# Patient Record
Sex: MALE | Race: WHITE | NOT HISPANIC OR LATINO | Employment: FULL TIME | URBAN - METROPOLITAN AREA
[De-identification: names, ages, dates, MRNs, and addresses within clinical notes are randomized per-mention and may not be internally consistent; named-entity substitution may affect disease eponyms.]

---

## 2023-02-07 PROBLEM — I10 ESSENTIAL HYPERTENSION, BENIGN: Status: ACTIVE | Noted: 2023-02-07

## 2023-02-07 PROBLEM — H10.9 BACTERIAL CONJUNCTIVITIS OF BOTH EYES: Status: ACTIVE | Noted: 2023-02-07

## 2023-02-07 PROBLEM — R53.83 MALAISE AND FATIGUE: Status: ACTIVE | Noted: 2023-02-07

## 2023-02-07 PROBLEM — R53.81 MALAISE AND FATIGUE: Status: ACTIVE | Noted: 2023-02-07

## 2023-02-07 PROBLEM — J18.9 CAP (COMMUNITY ACQUIRED PNEUMONIA): Status: ACTIVE | Noted: 2023-02-07

## 2023-02-07 PROBLEM — R91.8 BILATERAL PULMONARY INFILTRATES ON CXR: Status: ACTIVE | Noted: 2023-02-07

## 2023-02-07 PROBLEM — B96.89 BACTERIAL CONJUNCTIVITIS OF BOTH EYES: Status: ACTIVE | Noted: 2023-02-07

## 2023-02-07 PROBLEM — R68.89 FLU-LIKE SYMPTOMS: Status: ACTIVE | Noted: 2023-02-07

## 2023-02-07 PROBLEM — N52.9 MALE ERECTILE DISORDER: Status: ACTIVE | Noted: 2023-02-07

## 2023-02-07 RX ORDER — HYDROCHLOROTHIAZIDE 25 MG/1
1 TABLET ORAL DAILY
COMMUNITY
Start: 2013-12-23 | End: 2023-03-21 | Stop reason: SDUPTHER

## 2023-03-21 ENCOUNTER — OFFICE VISIT (OUTPATIENT)
Dept: PRIMARY CARE | Facility: CLINIC | Age: 78
End: 2023-03-21
Payer: MEDICARE

## 2023-03-21 VITALS
WEIGHT: 176 LBS | BODY MASS INDEX: 26.07 KG/M2 | SYSTOLIC BLOOD PRESSURE: 106 MMHG | HEART RATE: 88 BPM | HEIGHT: 69 IN | OXYGEN SATURATION: 94 % | TEMPERATURE: 97.9 F | DIASTOLIC BLOOD PRESSURE: 70 MMHG

## 2023-03-21 DIAGNOSIS — I10 ESSENTIAL HYPERTENSION, BENIGN: ICD-10-CM

## 2023-03-21 DIAGNOSIS — E78.2 MIXED HYPERLIPIDEMIA: ICD-10-CM

## 2023-03-21 DIAGNOSIS — Z00.00 ROUTINE GENERAL MEDICAL EXAMINATION AT HEALTH CARE FACILITY: Primary | ICD-10-CM

## 2023-03-21 PROBLEM — R53.83 MALAISE AND FATIGUE: Status: RESOLVED | Noted: 2023-02-07 | Resolved: 2023-03-21

## 2023-03-21 PROBLEM — R91.8 BILATERAL PULMONARY INFILTRATES ON CXR: Status: RESOLVED | Noted: 2023-02-07 | Resolved: 2023-03-21

## 2023-03-21 PROBLEM — J18.9 CAP (COMMUNITY ACQUIRED PNEUMONIA): Status: RESOLVED | Noted: 2023-02-07 | Resolved: 2023-03-21

## 2023-03-21 PROBLEM — R53.81 MALAISE AND FATIGUE: Status: RESOLVED | Noted: 2023-02-07 | Resolved: 2023-03-21

## 2023-03-21 PROBLEM — H10.9 BACTERIAL CONJUNCTIVITIS OF BOTH EYES: Status: RESOLVED | Noted: 2023-02-07 | Resolved: 2023-03-21

## 2023-03-21 PROBLEM — B96.89 BACTERIAL CONJUNCTIVITIS OF BOTH EYES: Status: RESOLVED | Noted: 2023-02-07 | Resolved: 2023-03-21

## 2023-03-21 PROBLEM — R68.89 FLU-LIKE SYMPTOMS: Status: RESOLVED | Noted: 2023-02-07 | Resolved: 2023-03-21

## 2023-03-21 LAB
ALANINE AMINOTRANSFERASE (SGPT) (U/L) IN SER/PLAS: 20 U/L (ref 10–52)
ALBUMIN (G/DL) IN SER/PLAS: 4.1 G/DL (ref 3.4–5)
ALKALINE PHOSPHATASE (U/L) IN SER/PLAS: 47 U/L (ref 33–136)
ANION GAP IN SER/PLAS: 13 MMOL/L (ref 10–20)
ASPARTATE AMINOTRANSFERASE (SGOT) (U/L) IN SER/PLAS: 19 U/L (ref 9–39)
BASOPHILS (10*3/UL) IN BLOOD BY AUTOMATED COUNT: 0.04 X10E9/L (ref 0–0.1)
BASOPHILS/100 LEUKOCYTES IN BLOOD BY AUTOMATED COUNT: 0.5 % (ref 0–2)
BILIRUBIN TOTAL (MG/DL) IN SER/PLAS: 0.6 MG/DL (ref 0–1.2)
CALCIUM (MG/DL) IN SER/PLAS: 9.7 MG/DL (ref 8.6–10.6)
CARBON DIOXIDE, TOTAL (MMOL/L) IN SER/PLAS: 27 MMOL/L (ref 21–32)
CHLORIDE (MMOL/L) IN SER/PLAS: 103 MMOL/L (ref 98–107)
CHOLESTEROL (MG/DL) IN SER/PLAS: 249 MG/DL (ref 0–199)
CHOLESTEROL IN HDL (MG/DL) IN SER/PLAS: 72.3 MG/DL
CHOLESTEROL/HDL RATIO: 3.4
CREATININE (MG/DL) IN SER/PLAS: 1.12 MG/DL (ref 0.5–1.3)
EOSINOPHILS (10*3/UL) IN BLOOD BY AUTOMATED COUNT: 0.13 X10E9/L (ref 0–0.4)
EOSINOPHILS/100 LEUKOCYTES IN BLOOD BY AUTOMATED COUNT: 1.6 % (ref 0–6)
ERYTHROCYTE DISTRIBUTION WIDTH (RATIO) BY AUTOMATED COUNT: 12.6 % (ref 11.5–14.5)
ERYTHROCYTE MEAN CORPUSCULAR HEMOGLOBIN CONCENTRATION (G/DL) BY AUTOMATED: 31.5 G/DL (ref 32–36)
ERYTHROCYTE MEAN CORPUSCULAR VOLUME (FL) BY AUTOMATED COUNT: 94 FL (ref 80–100)
ERYTHROCYTES (10*6/UL) IN BLOOD BY AUTOMATED COUNT: 4.81 X10E12/L (ref 4.5–5.9)
GFR MALE: 67 ML/MIN/1.73M2
GLUCOSE (MG/DL) IN SER/PLAS: 102 MG/DL (ref 74–99)
HEMATOCRIT (%) IN BLOOD BY AUTOMATED COUNT: 45.4 % (ref 41–52)
HEMOGLOBIN (G/DL) IN BLOOD: 14.3 G/DL (ref 13.5–17.5)
IMMATURE GRANULOCYTES/100 LEUKOCYTES IN BLOOD BY AUTOMATED COUNT: 0.5 % (ref 0–0.9)
LDL: 153 MG/DL (ref 0–99)
LEUKOCYTES (10*3/UL) IN BLOOD BY AUTOMATED COUNT: 8.3 X10E9/L (ref 4.4–11.3)
LYMPHOCYTES (10*3/UL) IN BLOOD BY AUTOMATED COUNT: 2.37 X10E9/L (ref 0.8–3)
LYMPHOCYTES/100 LEUKOCYTES IN BLOOD BY AUTOMATED COUNT: 28.6 % (ref 13–44)
MONOCYTES (10*3/UL) IN BLOOD BY AUTOMATED COUNT: 0.67 X10E9/L (ref 0.05–0.8)
MONOCYTES/100 LEUKOCYTES IN BLOOD BY AUTOMATED COUNT: 8.1 % (ref 2–10)
NEUTROPHILS (10*3/UL) IN BLOOD BY AUTOMATED COUNT: 5.03 X10E9/L (ref 1.6–5.5)
NEUTROPHILS/100 LEUKOCYTES IN BLOOD BY AUTOMATED COUNT: 60.7 % (ref 40–80)
NRBC (PER 100 WBCS) BY AUTOMATED COUNT: 0 /100 WBC (ref 0–0)
PLATELETS (10*3/UL) IN BLOOD AUTOMATED COUNT: 265 X10E9/L (ref 150–450)
POTASSIUM (MMOL/L) IN SER/PLAS: 3.3 MMOL/L (ref 3.5–5.3)
PROTEIN TOTAL: 6.8 G/DL (ref 6.4–8.2)
SODIUM (MMOL/L) IN SER/PLAS: 140 MMOL/L (ref 136–145)
TRIGLYCERIDE (MG/DL) IN SER/PLAS: 117 MG/DL (ref 0–149)
UREA NITROGEN (MG/DL) IN SER/PLAS: 18 MG/DL (ref 6–23)
VLDL: 23 MG/DL (ref 0–40)

## 2023-03-21 PROCEDURE — 85025 COMPLETE CBC W/AUTO DIFF WBC: CPT

## 2023-03-21 PROCEDURE — 1170F FXNL STATUS ASSESSED: CPT | Performed by: INTERNAL MEDICINE

## 2023-03-21 PROCEDURE — G0439 PPPS, SUBSEQ VISIT: HCPCS | Performed by: INTERNAL MEDICINE

## 2023-03-21 PROCEDURE — 1159F MED LIST DOCD IN RCRD: CPT | Performed by: INTERNAL MEDICINE

## 2023-03-21 PROCEDURE — 80053 COMPREHEN METABOLIC PANEL: CPT

## 2023-03-21 PROCEDURE — 3078F DIAST BP <80 MM HG: CPT | Performed by: INTERNAL MEDICINE

## 2023-03-21 PROCEDURE — 80061 LIPID PANEL: CPT

## 2023-03-21 PROCEDURE — 99213 OFFICE O/P EST LOW 20 MIN: CPT | Performed by: INTERNAL MEDICINE

## 2023-03-21 PROCEDURE — 3074F SYST BP LT 130 MM HG: CPT | Performed by: INTERNAL MEDICINE

## 2023-03-21 PROCEDURE — 1160F RVW MEDS BY RX/DR IN RCRD: CPT | Performed by: INTERNAL MEDICINE

## 2023-03-21 PROCEDURE — 36415 COLL VENOUS BLD VENIPUNCTURE: CPT | Performed by: INTERNAL MEDICINE

## 2023-03-21 PROCEDURE — G0444 DEPRESSION SCREEN ANNUAL: HCPCS | Performed by: INTERNAL MEDICINE

## 2023-03-21 RX ORDER — LATANOPROST 50 UG/ML
1 SOLUTION/ DROPS OPHTHALMIC NIGHTLY
COMMUNITY
Start: 2023-03-05

## 2023-03-21 RX ORDER — HYDROCHLOROTHIAZIDE 25 MG/1
25 TABLET ORAL DAILY
Qty: 90 TABLET | Refills: 3 | Status: SHIPPED | OUTPATIENT
Start: 2023-03-21 | End: 2024-03-13

## 2023-03-21 ASSESSMENT — PATIENT HEALTH QUESTIONNAIRE - PHQ9
1. LITTLE INTEREST OR PLEASURE IN DOING THINGS: NOT AT ALL
SUM OF ALL RESPONSES TO PHQ9 QUESTIONS 1 AND 2: 0
2. FEELING DOWN, DEPRESSED OR HOPELESS: NOT AT ALL

## 2023-03-21 ASSESSMENT — COLUMBIA-SUICIDE SEVERITY RATING SCALE - C-SSRS
6. HAVE YOU EVER DONE ANYTHING, STARTED TO DO ANYTHING, OR PREPARED TO DO ANYTHING TO END YOUR LIFE?: NO
2. HAVE YOU ACTUALLY HAD ANY THOUGHTS OF KILLING YOURSELF?: NO
1. IN THE PAST MONTH, HAVE YOU WISHED YOU WERE DEAD OR WISHED YOU COULD GO TO SLEEP AND NOT WAKE UP?: NO

## 2023-03-21 ASSESSMENT — ACTIVITIES OF DAILY LIVING (ADL)
MANAGING_FINANCES: INDEPENDENT
GROCERY_SHOPPING: INDEPENDENT
DOING_HOUSEWORK: INDEPENDENT
DRESSING: INDEPENDENT
TAKING_MEDICATION: INDEPENDENT
BATHING: INDEPENDENT

## 2023-03-21 ASSESSMENT — ENCOUNTER SYMPTOMS
LOSS OF SENSATION IN FEET: 0
OCCASIONAL FEELINGS OF UNSTEADINESS: 0
DEPRESSION: 0

## 2023-03-21 NOTE — PROGRESS NOTES
"Subjective   Reason for Visit: Montana Gagnon is an 78 y.o. male here for a Medicare Wellness visit.      Interim:  - recovered completely from CAP    Only past medical history of hypertension, it generally has been well controlled on hydrochlorothiazide 25 mg daily.  Weight is stable, trying to exercise, first walking and then back on recumbent bike.      He denies any chest pain or shortness of breath.     He is , lives at home with his wife. 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  History of smoking, up to one pack per day between the ages of 14 and 49. Drinks 2-3 glasses of wine per week on average.   Has had a diet that has needed improvement; mainly carbs and portion size.      Saw his ophthalmologist, Dr. Mccabe and retinal pending.  Brother is a dermatologist, runs things by him intermittently    Reviewed all medications by prescribing practitioner or clinical pharmacist (such as prescriptions, OTCs, herbal therapies and supplements) and documented in the medical record.    HPI    Patient Care Team:  Willy Irizarry MD as PCP - General  Willy Irizarry MD as PCP [place] [facility] [place]     Review of Systems    Objective   Vitals:  /70   Pulse 88   Temp 36.6 °C (97.9 °F)   Ht 1.74 m (5' 8.5\")   Wt 79.8 kg (176 lb)   SpO2 94%   BMI 26.37 kg/m²       Physical Exam  Gen: NAD, pleasant, A&Ox3  HEENT: PERRL, EOMI, MMM, OP clear  Neck: supple, no thyromegaly, no JVD, normal carotid upstroke  Pulm: lungs CTAB, good air movement  CV: RRR, early systolic ejection murmur, 2+ DP pulses  Abd: NABS, soft, NT, ND no HSM  Ext: no peripheral edema  Neuro: CN II-XII intact, no focal sensory or motor deficits, normal reflexes      Assessment/Plan   Hypertension: Adequately controlled, continue HCTZ 25 mg  -metabolic screening and renal function today     Occ constipation: uses dulcolax; discussed fluids, fiber, exercise     Health maintenance  -Last colonoscopy: Normal " Cologuard test 12/17/2018, discussed r/b of continued screening, has not repeated  - DEXA 10/14/2019, unremarkable  -PSA: N/A, no symptoms  -Smoking history:history of about 30 pack years, remote  -Counseled regarding diet and exercise  -Immunizations: Recommend Shingrix, TDap and annual flu  -Followup annually and as needed  Problem List Items Addressed This Visit          Circulatory    Essential hypertension, benign     Other Visit Diagnoses       Routine general medical examination at health care facility    -  Primary

## 2023-03-21 NOTE — PROGRESS NOTES
"Subjective   Reason for Visit: Montana Gagnon is an 78 y.o. male here for a Medicare Wellness visit.          Reviewed all medications by prescribing practitioner or clinical pharmacist (such as prescriptions, OTCs, herbal therapies and supplements) and documented in the medical record.    HPI    Patient Care Team:  Willy Irizarry MD as PCP - General  Willy Irizarry MD as PCP [place] [facility] [place]     Review of Systems    Objective   Vitals:  /70   Pulse 88   Temp 36.6 °C (97.9 °F)   Ht 1.74 m (5' 8.5\")   Wt 79.8 kg (176 lb)   SpO2 94%   BMI 26.37 kg/m²       Physical Exam    Assessment/Plan   Problem List Items Addressed This Visit    None  Visit Diagnoses     Routine general medical examination at health care facility    -  Primary               "

## 2023-04-14 RX ORDER — ROSUVASTATIN CALCIUM 10 MG/1
10 TABLET, COATED ORAL DAILY
Qty: 90 TABLET | Refills: 3 | Status: SHIPPED | OUTPATIENT
Start: 2023-04-14 | End: 2023-05-31 | Stop reason: SDUPTHER

## 2023-05-31 DIAGNOSIS — E78.2 MIXED HYPERLIPIDEMIA: ICD-10-CM

## 2023-05-31 RX ORDER — ROSUVASTATIN CALCIUM 10 MG/1
10 TABLET, COATED ORAL DAILY
Qty: 90 TABLET | Refills: 3 | Status: SHIPPED | OUTPATIENT
Start: 2023-05-31 | End: 2024-05-10 | Stop reason: SDUPTHER

## 2024-03-13 DIAGNOSIS — Z00.00 ROUTINE GENERAL MEDICAL EXAMINATION AT HEALTH CARE FACILITY: ICD-10-CM

## 2024-03-13 DIAGNOSIS — I10 ESSENTIAL HYPERTENSION, BENIGN: ICD-10-CM

## 2024-03-13 RX ORDER — HYDROCHLOROTHIAZIDE 25 MG/1
25 TABLET ORAL DAILY
Qty: 90 TABLET | Refills: 3 | Status: SHIPPED | OUTPATIENT
Start: 2024-03-13

## 2024-03-22 ENCOUNTER — OFFICE VISIT (OUTPATIENT)
Dept: PRIMARY CARE | Facility: CLINIC | Age: 79
End: 2024-03-22
Payer: MEDICARE

## 2024-03-22 VITALS
WEIGHT: 180 LBS | HEIGHT: 69 IN | OXYGEN SATURATION: 96 % | HEART RATE: 79 BPM | DIASTOLIC BLOOD PRESSURE: 72 MMHG | TEMPERATURE: 97.3 F | SYSTOLIC BLOOD PRESSURE: 119 MMHG | BODY MASS INDEX: 26.66 KG/M2

## 2024-03-22 DIAGNOSIS — I10 ESSENTIAL HYPERTENSION, BENIGN: ICD-10-CM

## 2024-03-22 DIAGNOSIS — Z00.00 ROUTINE GENERAL MEDICAL EXAMINATION AT HEALTH CARE FACILITY: Primary | ICD-10-CM

## 2024-03-22 DIAGNOSIS — E78.2 MIXED HYPERLIPIDEMIA: ICD-10-CM

## 2024-03-22 DIAGNOSIS — I35.0 AORTIC VALVE STENOSIS, ETIOLOGY OF CARDIAC VALVE DISEASE UNSPECIFIED: ICD-10-CM

## 2024-03-22 LAB
ALBUMIN SERPL BCP-MCNC: 4.1 G/DL (ref 3.4–5)
ALP SERPL-CCNC: 45 U/L (ref 33–136)
ALT SERPL W P-5'-P-CCNC: 22 U/L (ref 10–52)
ANION GAP SERPL CALC-SCNC: 14 MMOL/L (ref 10–20)
AST SERPL W P-5'-P-CCNC: 18 U/L (ref 9–39)
BILIRUB SERPL-MCNC: 0.8 MG/DL (ref 0–1.2)
BUN SERPL-MCNC: 21 MG/DL (ref 6–23)
CALCIUM SERPL-MCNC: 9.7 MG/DL (ref 8.6–10.6)
CHLORIDE SERPL-SCNC: 103 MMOL/L (ref 98–107)
CHOLEST SERPL-MCNC: 162 MG/DL (ref 0–199)
CHOLESTEROL/HDL RATIO: 2.1
CO2 SERPL-SCNC: 27 MMOL/L (ref 21–32)
CREAT SERPL-MCNC: 1 MG/DL (ref 0.5–1.3)
CREAT UR STRIP-MCNC: 200 MG/DL
EGFRCR SERPLBLD CKD-EPI 2021: 76 ML/MIN/1.73M*2
ERYTHROCYTE [DISTWIDTH] IN BLOOD BY AUTOMATED COUNT: 12.3 % (ref 11.5–14.5)
GLUCOSE SERPL-MCNC: 102 MG/DL (ref 74–99)
HCT VFR BLD AUTO: 42.7 % (ref 41–52)
HDLC SERPL-MCNC: 76.2 MG/DL
HGB BLD-MCNC: 13.8 G/DL (ref 13.5–17.5)
LDLC SERPL CALC-MCNC: 72 MG/DL
MCH RBC QN AUTO: 31.1 PG (ref 26–34)
MCHC RBC AUTO-ENTMCNC: 32.3 G/DL (ref 32–36)
MCV RBC AUTO: 96 FL (ref 80–100)
MICROALBUMIN UR TEST STR-MCNC: 30 MG/L
NON HDL CHOLESTEROL: 86 MG/DL (ref 0–149)
NRBC BLD-RTO: 0 /100 WBCS (ref 0–0)
PLATELET # BLD AUTO: 226 X10*3/UL (ref 150–450)
POTASSIUM SERPL-SCNC: 3.8 MMOL/L (ref 3.5–5.3)
PROT SERPL-MCNC: 7 G/DL (ref 6.4–8.2)
PROT/CREAT UR: <30 UG/MG CREAT
RBC # BLD AUTO: 4.44 X10*6/UL (ref 4.5–5.9)
SODIUM SERPL-SCNC: 140 MMOL/L (ref 136–145)
TRIGL SERPL-MCNC: 69 MG/DL (ref 0–149)
VLDL: 14 MG/DL (ref 0–40)
WBC # BLD AUTO: 8.6 X10*3/UL (ref 4.4–11.3)

## 2024-03-22 PROCEDURE — 1036F TOBACCO NON-USER: CPT | Performed by: INTERNAL MEDICINE

## 2024-03-22 PROCEDURE — 82570 ASSAY OF URINE CREATININE: CPT | Mod: CLIA WAIVED TEST | Performed by: INTERNAL MEDICINE

## 2024-03-22 PROCEDURE — 85027 COMPLETE CBC AUTOMATED: CPT

## 2024-03-22 PROCEDURE — 99214 OFFICE O/P EST MOD 30 MIN: CPT | Performed by: INTERNAL MEDICINE

## 2024-03-22 PROCEDURE — G0439 PPPS, SUBSEQ VISIT: HCPCS | Performed by: INTERNAL MEDICINE

## 2024-03-22 PROCEDURE — 36415 COLL VENOUS BLD VENIPUNCTURE: CPT

## 2024-03-22 PROCEDURE — 3074F SYST BP LT 130 MM HG: CPT | Performed by: INTERNAL MEDICINE

## 2024-03-22 PROCEDURE — 1170F FXNL STATUS ASSESSED: CPT | Performed by: INTERNAL MEDICINE

## 2024-03-22 PROCEDURE — 1123F ACP DISCUSS/DSCN MKR DOCD: CPT | Performed by: INTERNAL MEDICINE

## 2024-03-22 PROCEDURE — 80061 LIPID PANEL: CPT

## 2024-03-22 PROCEDURE — 82043 UR ALBUMIN QUANTITATIVE: CPT | Mod: CLIA WAIVED TEST | Performed by: INTERNAL MEDICINE

## 2024-03-22 PROCEDURE — 1160F RVW MEDS BY RX/DR IN RCRD: CPT | Performed by: INTERNAL MEDICINE

## 2024-03-22 PROCEDURE — 80053 COMPREHEN METABOLIC PANEL: CPT

## 2024-03-22 PROCEDURE — 1159F MED LIST DOCD IN RCRD: CPT | Performed by: INTERNAL MEDICINE

## 2024-03-22 PROCEDURE — 3078F DIAST BP <80 MM HG: CPT | Performed by: INTERNAL MEDICINE

## 2024-03-22 RX ORDER — BISMUTH SUBSALICYLATE 262 MG
1 TABLET,CHEWABLE ORAL DAILY
COMMUNITY

## 2024-03-22 ASSESSMENT — ACTIVITIES OF DAILY LIVING (ADL)
TAKING_MEDICATION: INDEPENDENT
MANAGING_FINANCES: INDEPENDENT
DRESSING: INDEPENDENT
BATHING: INDEPENDENT
GROCERY_SHOPPING: INDEPENDENT
DOING_HOUSEWORK: INDEPENDENT

## 2024-03-22 ASSESSMENT — ENCOUNTER SYMPTOMS
DEPRESSION: 0
LOSS OF SENSATION IN FEET: 0
OCCASIONAL FEELINGS OF UNSTEADINESS: 0

## 2024-03-22 NOTE — PROGRESS NOTES
"Subjective   Reason for Visit: Montana Gagnon is an 79 y.o. male here for a Medicare Wellness visit.     Doing well, no health issues this year.    Only past medical history of hypertension, it generally has been well controlled on hydrochlorothiazide 25 mg daily.  Weight is stable, trying to exercise, first walking and occasionally recumbent bike.      He is , lives at home with his wife. 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  History of smoking, up to one pack per day between the ages of 14 and 49. Drinks 1-2 glasses of wine per week on average.   Has had a diet that has needed improvement; mainly carbs and portion size.      Saw his ophthalmologist, Dr. Mccabe and Dr. Alcocer (retinal).  Brother is a dermatologist, runs things by him intermittently         HPI    Patient Care Team:  Willy Irizarry MD as PCP - General  Willy Irizarry MD [place] PCP [place] [facility] [place] [place]     Review of Systems    Objective   Vitals:  /72   Pulse 79   Temp 36.3 °C (97.3 °F)   Ht 1.74 m (5' 8.5\")   Wt 81.6 kg (180 lb)   SpO2 96%   BMI 26.97 kg/m²       Physical Exam  Gen: NAD, pleasant, A&Ox3  HEENT: PERRL, EOMI, MMM, OP clear  Neck: supple, no thyromegaly, no JVD, normal carotid upstroke  Pulm: lungs CTAB, good air movement  CV: RRR, iv/vi, a/d, aortic murmur, 2+ DP pulses  Abd: NABS, soft, NT, ND no HSM  Ext: no peripheral edema  Neuro: CN II-XII intact, no focal sensory or motor deficits, normal reflexes      Assessment/Plan   Hypertension/HLD:   -continue HCTZ 25 mg  - continue rosuvastatin 10mg daily  -metabolic screening and renal function today    Systolic murmur: suspect AoS, discussed potential risks and diagnosis/surveillance/treatment; check TTE        Health maintenance  -Last colonoscopy: Normal Cologuard test 2022  - DEXA 10/14/2019, unremarkable  -PSA: N/A, no symptoms  -Smoking history:history of about 30 pack years, remote  -Counseled regarding diet and " exercise  -Immunizations: current  -Followup annually and as needed  Problem List Items Addressed This Visit    None    The ASCVD Risk score ([place] DK, et al., 2019) failed to calculate for the following reasons:    The 2019 ASCVD risk score is only valid for ages 40 to 79  Cardiovascular risk discussed and modifiable risk factors addressed.  Discussion included options of pharmaceutical interventions and recommended lifestyle modifications, including nutritional choices, exercise, and elimination of habits contributing to risk.

## 2024-05-02 ENCOUNTER — HOSPITAL ENCOUNTER (OUTPATIENT)
Dept: CARDIOLOGY | Facility: HOSPITAL | Age: 79
Discharge: HOME | End: 2024-05-02
Payer: MEDICARE

## 2024-05-02 DIAGNOSIS — I06.8 OTHER RHEUMATIC AORTIC VALVE DISEASES: ICD-10-CM

## 2024-05-02 DIAGNOSIS — I35.0 AORTIC VALVE STENOSIS, ETIOLOGY OF CARDIAC VALVE DISEASE UNSPECIFIED: ICD-10-CM

## 2024-05-02 DIAGNOSIS — I35.8 OTHER NONRHEUMATIC AORTIC VALVE DISORDERS: ICD-10-CM

## 2024-05-02 LAB
AORTIC VALVE MEAN GRADIENT: 22.6 MMHG
AORTIC VALVE PEAK VELOCITY: 3.02 M/S
AV PEAK GRADIENT: 36.5 MMHG
AVA (PEAK VEL): 1.02 CM2
AVA (VTI): 1.13 CM2
EJECTION FRACTION APICAL 4 CHAMBER: 53.1
LEFT ATRIUM VOLUME AREA LENGTH INDEX BSA: 31.1 ML/M2
LEFT VENTRICLE INTERNAL DIMENSION DIASTOLE: 5.03 CM (ref 3.5–6)
LEFT VENTRICULAR OUTFLOW TRACT DIAMETER: 2.33 CM
LV EJECTION FRACTION BIPLANE: 53 %
MITRAL VALVE E/A RATIO: 0.97
RIGHT VENTRICLE FREE WALL PEAK S': 12 CM/S
TRICUSPID ANNULAR PLANE SYSTOLIC EXCURSION: 2.5 CM

## 2024-05-02 PROCEDURE — 93306 TTE W/DOPPLER COMPLETE: CPT | Performed by: INTERNAL MEDICINE

## 2024-05-02 PROCEDURE — 93306 TTE W/DOPPLER COMPLETE: CPT

## 2024-05-07 DIAGNOSIS — I35.0 AORTIC VALVE STENOSIS, ETIOLOGY OF CARDIAC VALVE DISEASE UNSPECIFIED: Primary | ICD-10-CM

## 2024-05-07 NOTE — PROGRESS NOTES
Subjective   Montana Gagnon is a 79 y.o. male who presents for No chief complaint on file..  HPI    Objective   There were no vitals taken for this visit.   Physical Exam    Assessment/Plan   Problem List Items Addressed This Visit    None           Willy Irizarry MD

## 2024-05-10 DIAGNOSIS — E78.2 MIXED HYPERLIPIDEMIA: ICD-10-CM

## 2024-05-10 RX ORDER — ROSUVASTATIN CALCIUM 10 MG/1
10 TABLET, COATED ORAL DAILY
Qty: 90 TABLET | Refills: 3 | Status: SHIPPED | OUTPATIENT
Start: 2024-05-10 | End: 2025-05-10

## 2024-06-03 ENCOUNTER — OFFICE VISIT (OUTPATIENT)
Dept: CARDIOLOGY | Facility: CLINIC | Age: 79
End: 2024-06-03
Payer: MEDICARE

## 2024-06-03 VITALS
DIASTOLIC BLOOD PRESSURE: 81 MMHG | OXYGEN SATURATION: 96 % | WEIGHT: 174.3 LBS | SYSTOLIC BLOOD PRESSURE: 134 MMHG | HEART RATE: 70 BPM | HEIGHT: 69 IN | RESPIRATION RATE: 18 BRPM | BODY MASS INDEX: 25.82 KG/M2

## 2024-06-03 DIAGNOSIS — I35.0 AORTIC VALVE STENOSIS, ETIOLOGY OF CARDIAC VALVE DISEASE UNSPECIFIED: ICD-10-CM

## 2024-06-03 DIAGNOSIS — R06.09 DOE (DYSPNEA ON EXERTION): ICD-10-CM

## 2024-06-03 DIAGNOSIS — E78.5 HYPERLIPIDEMIA, UNSPECIFIED HYPERLIPIDEMIA TYPE: ICD-10-CM

## 2024-06-03 DIAGNOSIS — I10 ESSENTIAL HYPERTENSION, BENIGN: Primary | ICD-10-CM

## 2024-06-03 PROCEDURE — 99205 OFFICE O/P NEW HI 60 MIN: CPT | Performed by: STUDENT IN AN ORGANIZED HEALTH CARE EDUCATION/TRAINING PROGRAM

## 2024-06-03 PROCEDURE — 3079F DIAST BP 80-89 MM HG: CPT | Performed by: STUDENT IN AN ORGANIZED HEALTH CARE EDUCATION/TRAINING PROGRAM

## 2024-06-03 PROCEDURE — 1123F ACP DISCUSS/DSCN MKR DOCD: CPT | Performed by: STUDENT IN AN ORGANIZED HEALTH CARE EDUCATION/TRAINING PROGRAM

## 2024-06-03 PROCEDURE — 99215 OFFICE O/P EST HI 40 MIN: CPT | Performed by: STUDENT IN AN ORGANIZED HEALTH CARE EDUCATION/TRAINING PROGRAM

## 2024-06-03 PROCEDURE — 3075F SYST BP GE 130 - 139MM HG: CPT | Performed by: STUDENT IN AN ORGANIZED HEALTH CARE EDUCATION/TRAINING PROGRAM

## 2024-06-03 PROCEDURE — 93005 ELECTROCARDIOGRAM TRACING: CPT | Performed by: STUDENT IN AN ORGANIZED HEALTH CARE EDUCATION/TRAINING PROGRAM

## 2024-06-03 PROCEDURE — 1159F MED LIST DOCD IN RCRD: CPT | Performed by: STUDENT IN AN ORGANIZED HEALTH CARE EDUCATION/TRAINING PROGRAM

## 2024-06-03 PROCEDURE — 1126F AMNT PAIN NOTED NONE PRSNT: CPT | Performed by: STUDENT IN AN ORGANIZED HEALTH CARE EDUCATION/TRAINING PROGRAM

## 2024-06-03 PROCEDURE — 1160F RVW MEDS BY RX/DR IN RCRD: CPT | Performed by: STUDENT IN AN ORGANIZED HEALTH CARE EDUCATION/TRAINING PROGRAM

## 2024-06-03 ASSESSMENT — ENCOUNTER SYMPTOMS
LOSS OF SENSATION IN FEET: 0
OCCASIONAL FEELINGS OF UNSTEADINESS: 0
DEPRESSION: 0

## 2024-06-03 ASSESSMENT — PATIENT HEALTH QUESTIONNAIRE - PHQ9
SUM OF ALL RESPONSES TO PHQ9 QUESTIONS 1 AND 2: 0
2. FEELING DOWN, DEPRESSED OR HOPELESS: NOT AT ALL
1. LITTLE INTEREST OR PLEASURE IN DOING THINGS: NOT AT ALL

## 2024-06-03 ASSESSMENT — COLUMBIA-SUICIDE SEVERITY RATING SCALE - C-SSRS
2. HAVE YOU ACTUALLY HAD ANY THOUGHTS OF KILLING YOURSELF?: NO
1. IN THE PAST MONTH, HAVE YOU WISHED YOU WERE DEAD OR WISHED YOU COULD GO TO SLEEP AND NOT WAKE UP?: NO
6. HAVE YOU EVER DONE ANYTHING, STARTED TO DO ANYTHING, OR PREPARED TO DO ANYTHING TO END YOUR LIFE?: NO

## 2024-06-03 ASSESSMENT — PAIN SCALES - GENERAL: PAINLEVEL: 0-NO PAIN

## 2024-06-03 NOTE — PATIENT INSTRUCTIONS
Dear Montana Gagnon,    It was a pleasure meeting you today at the Cardiology office. As we dicussed, your clinical condition is moderate aortic stenosis and shortness of breath. I recommended an aortic valve calcium score, a stress test and a blood BNP. I will contact you once your results are available to give you my impressions through [employer].    Sincerely,     Prem Stover MD

## 2024-06-03 NOTE — PROGRESS NOTES
"Location of visit: [address]   Type of Visit: New    Chief Complaint:  Patient was referred to Cardiology by Dr. Irizarry for aortic stenosis.    History Of Present Illness:    Montana Gagnon \"Nayana" is a 79 y.o. male, with history significant for HTN and HLD who visits Cardiology today as a new patient  for aortic stenosis. Due to systolic murmur, Dr. Irizarry ordered a transthoracic echocardiogram. Study demonstrated low normal LVEF, mild LV wall thickening and LVH, a possibly bicuspid AV (RCC+LCC), with moderate calcification, moderate stenosis, mild AI, STJ effacement, and mild ascending aorta dilation.    Patient symptomatic, with dyspnea on exertion in the last 6 months when increasing his workout intensity. He denies chest pain, orthopnea, PND, nocturia, edema, palpitations, dizziness, lightheadedness, syncope, or claudication.    Last LDL 72 3/2024 on Crestor 10    Blood pressure today: 134/81 mmHg, which is is mildly higher than his usual measurements.     Today's ECG shows sinus rhythm at 70 bpm, normal AV conduction, possible LVH, nonspecific ventricular repolarization abnormalities.    Past Medical History:  He has a past medical history of CAP (community acquired pneumonia) (02/07/2023), Headache, unspecified (01/16/2019), Other conditions influencing health status, Other intervertebral disc degeneration, lumbar region, Other intervertebral disc degeneration, lumbar region, Personal history of other diseases of male genital organs, and Radiculopathy, lumbar region.    Past Surgical History:  He has a past surgical history that includes Other surgical history (06/25/2013); Other surgical history (06/25/2013); Other surgical history (12/17/2018); and Other surgical history (12/17/2018).    Social History:  He reports that he has quit smoking. His smoking use included cigarettes. He has never used smokeless tobacco. He reports current alcohol use of about 1.0 - 2.0 standard drink of alcohol per week. " "He reports that he does not use drugs.    Family History:  Family History   Problem Relation Name Age of Onset    Heart attack Mother  84     Allergies:  Patient has no known allergies.    Outpatient Medications:  Current Outpatient Medications   Medication Instructions    hydroCHLOROthiazide (HYDRODIURIL) 25 mg, oral, Daily    latanoprost (Xalatan) 0.005 % ophthalmic solution 1 drop, Both Eyes, Nightly    multivitamin tablet 1 tablet, oral, Daily    rosuvastatin (CRESTOR) 10 mg, oral, Daily     Last Recorded Vitals:  Vitals:    06/03/24 0818   BP: 134/81   BP Location: Left arm   Patient Position: Sitting   Pulse: 70   Resp: 18   SpO2: 96%   Weight: 79.1 kg (174 lb 4.8 oz)   Height: 1.74 m (5' 8.5\")     Physical Exam:      6/3/2024     8:18 AM 3/22/2024     8:22 AM 3/21/2023     2:27 PM 1/24/2023     3:36 PM 1/12/2022     2:09 PM 1/11/2021     2:09 PM   Vitals   Systolic 134 119 106 124 122 131   Diastolic 81 72 70 73 70 73   Heart Rate 70 79 88 81 73 71   Temp  36.3 °C (97.3 °F) 36.6 °C (97.9 °F) 38.2 °C (100.7 °F) 36.1 °C (97 °F) 35.9 °C (96.6 °F)   Resp 18        Height (in) 1.74 m (5' 8.5\") 1.74 m (5' 8.5\") 1.74 m (5' 8.5\")  1.74 m (5' 8.5\") 1.74 m (5' 8.5\")   Weight (lb) 174.3 180 176 176 188 190   BMI 26.12 kg/m2 26.97 kg/m2 26.37 kg/m2 26.37 kg/m2 28.17 kg/m2 28.47 kg/m2   BSA (m2) 1.96 m2 1.99 m2 1.96 m2 1.96 m2 2.03 m2 2.04 m2   Visit Report Report Report Report        Wt Readings from Last 5 Encounters:   06/03/24 79.1 kg (174 lb 4.8 oz)   03/22/24 81.6 kg (180 lb)   03/21/23 79.8 kg (176 lb)   01/24/23 79.8 kg (176 lb)   01/12/22 85.3 kg (188 lb)     General: Sitting up comfortably in chair; in no apparent distress.  HEENT: Normocephalic; atraumatic. Well hydrated.  Eyes: Anicteric sclera. Extraocular movement intact.  Neck: Supple; no thyromegaly; normal jugular venous pressure, no bruits.  Respiratory: Bilateral air entry equal. No wheezing.  Cardiovascular: Normal S1, S2; no murmurs " auscultated.  Abdomen: Nondistended; nontender. (+) bowel sounds.  Extremities: No peripheral edema present. Pulses 2+ diffusely.  Neurological: Oriented to time, place, and person; nonfocal.  Psychiatric: Normal affect.     Last Labs Reviewed:  CBC -  Recent Labs     03/22/24 0902 03/21/23 1512 01/12/22  1450 01/11/21  0000 12/17/18  1522   WBC 8.6 8.3 8.7 8.9 7.5   HGB 13.8 14.3 15.6 14.8 14.8   HCT 42.7 45.4 48.1 44.7 45.7    265 247 227 207   MCV 96 94 94 93 95     CMP -  Recent Labs     03/22/24 0902 03/21/23 1512 01/12/22  1450 01/11/21  0000 12/17/18  1522    140 142 142 138   K 3.8 3.3* 3.6 3.9 3.7    103 104 103 105   CO2 27 27 29 31 25   ANIONGAP 14 13 13 12 12   BUN 21 18 20 20 15   CREATININE 1.00 1.12 0.91 1.04 0.90   EGFR 76  --   --   --   --    CALCIUM 9.7 9.7 10.0 9.6 9.8     Recent Labs     03/22/24 0902 03/21/23  1512 01/12/22  1450 01/11/21  0000 12/17/18  1522   ALBUMIN 4.1 4.1 4.3 4.1 4.3   ALKPHOS 45 47 58 49 54   ALT 22 20 29 18 19   AST 18 19 23 22 19   BILITOT 0.8 0.6 0.5 0.5 0.7     LIPID PANEL -   Recent Labs     03/22/24 0902 03/21/23  1512 01/12/22  1450 01/11/21  0000   CHOL 162 249* 238* 208*   LDLF  --  153* 146* 92   LDLCALC 72  --   --   --    HDL 76.2 72.3 65.0 48.0   TRIG 69 117 134 342*     Last Cardiology/Imaging Tests Personally Reviewed (if images available) and Interpreted:  ECG:  No results found.    Echocardiogram:  Transthoracic Echo Complete 05/02/2024   1. Left ventricular systolic function is low normal with a 50-55% estimated ejection fraction.   2. There is mild asymmetric left ventricular hypertrophy.   3. Moderate aortic valve stenosis.   4. There is moderate aortic valve cusp calcification.   5. Mild aortic valve regurgitation.    Cath:  No results found.    Stress Test:  No results found.    Cardiac CT/MRI:  No results found.    CV RISK FACTORS:   # Hypertension: Last BP: 134/81.  # Hyperlipidemia: Last Tchol 162 / LDL No results found  for requested labs within last 365 days. / HDL 76.2 / TRIG 69 (3/22/2024:  9:02 AM).  # Type II Diabetes Mellitus: Last A1c No results found for requested labs within last 365 days. (No results in last year.).  # Obesity: Last BMI: 26.11.  # CKD: Last BUN/Cr (GFR): 21/1.00 (76), 3/22/2024:  9:02 AM.    ASCV RISK:  The ASCVD Risk score ([place] SCHMIDT, et al., 2019) failed to calculate for the following reasons:    The 2019 ASCVD risk score is only valid for ages 40 to 79    Assessment/Plan   79 y.o. male, with history significant for HTN and HLD who visits Cardiology today as a new patient  for aortic stenosis. Transthoracic echocardiogram moderate AS in a possibly bicuspid valve, with low normal LVEF and mild LV wall thickening and LVH. Patient reports PETERSON in the last 6 months that should not be associated to the AS unless the degree of stenosis was underestimated. Differential diagnosis should include angina equivalent. His blood pressure is well controlled on his actual regimen.    #moderate AS  #PETERSON  #HTN  #HLD    Recommendations:  - Aortic valve calcium score, IF severely elevated will plan cardiac MRI  - BNP  - Continue HTN regimen  - Continue Crestor and work on diet to bring LDL <70  - I will contact the patient once the results are available through [employer]  - I spent 60 minutes assessing the case between pre-charting, face-to-face patient interaction, and documentation    Prem Stover MD

## 2024-06-07 ENCOUNTER — HOSPITAL ENCOUNTER (OUTPATIENT)
Dept: CARDIOLOGY | Facility: CLINIC | Age: 79
Discharge: HOME | End: 2024-06-07
Payer: MEDICARE

## 2024-06-07 ENCOUNTER — LAB (OUTPATIENT)
Dept: LAB | Facility: LAB | Age: 79
End: 2024-06-07
Payer: MEDICARE

## 2024-06-07 DIAGNOSIS — I35.0 AORTIC VALVE STENOSIS, ETIOLOGY OF CARDIAC VALVE DISEASE UNSPECIFIED: ICD-10-CM

## 2024-06-07 DIAGNOSIS — R06.09 DOE (DYSPNEA ON EXERTION): ICD-10-CM

## 2024-06-07 LAB — BNP SERPL-MCNC: 35 PG/ML (ref 0–99)

## 2024-06-07 PROCEDURE — 83880 ASSAY OF NATRIURETIC PEPTIDE: CPT

## 2024-06-07 PROCEDURE — 93016 CV STRESS TEST SUPVJ ONLY: CPT | Performed by: INTERNAL MEDICINE

## 2024-06-07 PROCEDURE — 93018 CV STRESS TEST I&R ONLY: CPT | Performed by: INTERNAL MEDICINE

## 2024-06-07 PROCEDURE — 93017 CV STRESS TEST TRACING ONLY: CPT

## 2024-06-07 PROCEDURE — 36415 COLL VENOUS BLD VENIPUNCTURE: CPT

## 2024-06-14 LAB
ATRIAL RATE: 70 BPM
P AXIS: 65 DEGREES
P OFFSET: 187 MS
P ONSET: 128 MS
PR INTERVAL: 194 MS
Q ONSET: 225 MS
QRS COUNT: 11 BEATS
QRS DURATION: 82 MS
QT INTERVAL: 402 MS
QTC CALCULATION(BAZETT): 434 MS
QTC FREDERICIA: 423 MS
R AXIS: 13 DEGREES
T AXIS: 24 DEGREES
T OFFSET: 426 MS
VENTRICULAR RATE: 70 BPM

## 2024-07-16 ENCOUNTER — APPOINTMENT (OUTPATIENT)
Dept: ORTHOPEDIC SURGERY | Facility: CLINIC | Age: 79
End: 2024-07-16
Payer: MEDICARE

## 2024-07-16 ENCOUNTER — HOSPITAL ENCOUNTER (OUTPATIENT)
Dept: RADIOLOGY | Facility: HOSPITAL | Age: 79
Discharge: HOME | End: 2024-07-16
Payer: MEDICARE

## 2024-07-16 DIAGNOSIS — M25.561 ACUTE BILATERAL KNEE PAIN: ICD-10-CM

## 2024-07-16 DIAGNOSIS — M25.561 ACUTE BILATERAL KNEE PAIN: Primary | ICD-10-CM

## 2024-07-16 DIAGNOSIS — M25.562 ACUTE BILATERAL KNEE PAIN: ICD-10-CM

## 2024-07-16 DIAGNOSIS — M25.562 ACUTE BILATERAL KNEE PAIN: Primary | ICD-10-CM

## 2024-07-16 PROCEDURE — 1160F RVW MEDS BY RX/DR IN RCRD: CPT | Performed by: ORTHOPAEDIC SURGERY

## 2024-07-16 PROCEDURE — 73562 X-RAY EXAM OF KNEE 3: CPT | Mod: RT

## 2024-07-16 PROCEDURE — 1036F TOBACCO NON-USER: CPT | Performed by: ORTHOPAEDIC SURGERY

## 2024-07-16 PROCEDURE — 1123F ACP DISCUSS/DSCN MKR DOCD: CPT | Performed by: ORTHOPAEDIC SURGERY

## 2024-07-16 PROCEDURE — 99204 OFFICE O/P NEW MOD 45 MIN: CPT | Performed by: ORTHOPAEDIC SURGERY

## 2024-07-16 PROCEDURE — 73560 X-RAY EXAM OF KNEE 1 OR 2: CPT | Mod: LT

## 2024-07-16 PROCEDURE — 20610 DRAIN/INJ JOINT/BURSA W/O US: CPT | Performed by: ORTHOPAEDIC SURGERY

## 2024-07-16 PROCEDURE — 1159F MED LIST DOCD IN RCRD: CPT | Performed by: ORTHOPAEDIC SURGERY

## 2024-07-16 PROCEDURE — 73562 X-RAY EXAM OF KNEE 3: CPT | Mod: RIGHT SIDE | Performed by: STUDENT IN AN ORGANIZED HEALTH CARE EDUCATION/TRAINING PROGRAM

## 2024-07-16 PROCEDURE — 73560 X-RAY EXAM OF KNEE 1 OR 2: CPT | Mod: RIGHT SIDE | Performed by: STUDENT IN AN ORGANIZED HEALTH CARE EDUCATION/TRAINING PROGRAM

## 2024-07-16 RX ORDER — TRIAMCINOLONE ACETONIDE 40 MG/ML
1 INJECTION, SUSPENSION INTRA-ARTICULAR; INTRAMUSCULAR
Status: COMPLETED | OUTPATIENT
Start: 2024-07-16 | End: 2024-07-16

## 2024-07-16 NOTE — PROGRESS NOTES
This is a consultation from Dr. Willy Irizarry MD for   Chief Complaint   Patient presents with    Right Knee - Pain    Left Knee - Pain     Mostly having issues with left knee        This is a 79 y.o. male who presents for evaluation of left knee pain.  Patient states has had problems on and off of his left knee for many years, about 15 years ago he had a cortisone injection.  This helped for a while but his knee has gotten worse lately in the last few months.  Sharp pain over the medial knee worse with walking proving with rest.  He is never had surgery on the knee.  Numbness or tingling no fevers or chills no shooting pain down the leg.  Does not take any medications.    Physical Exam    There has been no interval change in this patient's past medical, surgical, medications, allergies, family history or social history since the most recent visit to a provider within our department. 14 point review of systems was performed, reviewed, and negative except for pertinent positives documented in the history of present illness.     Constitutional: well developed, well nourished male in no acute distress  Psychiatric: normal mood, appropriate affect  Eyes: sclera anicteric  HENT: normocephalic/atraumatic  CV: regular rate and rhythm   Respiratory: non labored breathing  Integumentary: no rash  Neurological: moves all extremities    Left knee exam: skin intact no lacerations or abrations. no effusion.  Tender medial joint line. negative log roll negative patellar grind. ROM 0-120. stable to varus and valgus stress at 0 and 30 degrees. negative lachman negative posterior drawer negative lei. 5/5 ehl/fhl/gs/ta. silt s/s/sp/dp/t. 2+ dp/pt        Xrays were ordered by me, they were reviewed and independently interpreted by me today, they show to severe degenerative changes bone-on-bone arthritis the medial compartment    L Inj/Asp: L knee on 7/16/2024 8:49 AM  Indications: pain and joint swelling  Details: 22 G needle,  anterolateral approach  Medications: 1 mL triamcinolone acetonide 40 mg/mL    Discussion:  I discussed the conservative treatment options for knee osteoarthritis including but not limited to physical therapy, oral NSAIDS, activity and lifestyle modification, and corticosteroid injections. Pt has elected to undergo a cortisone injection today. I have explained the risk and benefits of an injection including the possibility of joint infection, bleeding, damage to cartilage, allergic reaction. Patient verbalized understanding and gave verbal consent wishes to proceed with a intra-articular cortisone injection for their knee.    Procedure:  After discussing the risk and benefits of the procedure, we proceeded with an intra-articular left knee injection. We discussed the risks and benefits and potential morbidity related to the treatment, and to the prescription medication administered in the injection    With the patient's informed verbal consent, the left knee was prepped in standard sterile fashion with Chlorhexidine. The skin was then anesthetized with ethyl chloride spray and cleaned again with Chlorhexidine. The knee was then apirated/injected with a prefilled 20-gauge syringe of 40 mg Kenalog + 4 ml Lidocaine using the lateral approach without complications.  The patient tolerated this well and felt immediate initial relief of symptoms. A bandaid was applied and the patient ambulated out of the clinic on ther own accord without difficulty. Patient was instructed to avoid physical activity for 24-48 hours to prevent the knees from swelling and may ice the knees as tolerated. Patient should contact the office if any signs of of infection appear: redness, fever, chills, drainage, swelling or warmth to the knees.  Pt understands that the injections can be repeated no sooner than 3 months.  Procedure, treatment alternatives, risks and benefits explained, specific risks discussed. Consent was given by the patient.  "Immediately prior to procedure a time out was called to verify the correct patient, procedure, equipment, support staff and site/side marked as required. Patient was prepped and draped in the usual sterile fashion.             Impression/Plan: This is a 79 y.o. male with left knee arthritis.  I had an in depth discussion with the patient regarding treatment options for arthritis and their relative risks and benefits. We reviewed surgical and nonsurgical option for treatment. Treatments include anti inflammatory medications, physical therapy, weight loss, activity modification, use of assistive devices, injection therapies. We discussed current prescriptions and risks and benefits of continuation of prescription medication as apporpriate. We discussed that arthritis is often progressive over time, an in end stage arthritis surgical interventions can be considered, including arthroplasty. All questions were answered and the patient voiced their understanding.  Will start with nonsurgical treatments I will see him back as needed    BMI Readings from Last 1 Encounters:   06/03/24 26.12 kg/m²      Lab Results   Component Value Date    CREATININE 1.00 03/22/2024     Tobacco Use: Medium Risk (7/16/2024)    Patient History     Smoking Tobacco Use: Former     Smokeless Tobacco Use: Never     Passive Exposure: Not on file      Computed MELD 3.0 unavailable. One or more values for this score either were not found within the given timeframe or did not fit some other criterion.  Computed MELD-Na unavailable. One or more values for this score either were not found within the given timeframe or did not fit some other criterion.       No results found for: \"HGBA1C\"  No results found for: \"STAPHMRSASCR\"  "

## 2024-07-16 NOTE — LETTER
July 16, 2024     Willy Irizarry MD  [address] [facility] [address]  [facility] [address]    Patient: Montana Gagnon   YOB: 1945   Date of Visit: 7/16/2024       Dear Dr. Willy Irizarry MD:    Thank you for referring Montana Gagnon to me for evaluation. Below are my notes for this consultation.  If you have questions, please do not hesitate to call me. I look forward to following your patient along with you.       Sincerely,     Zaire Escobar MD      CC: No Recipients  ______________________________________________________________________________________    This is a consultation from Dr. Willy Irizarry MD for   Chief Complaint   Patient presents with   • Right Knee - Pain   • Left Knee - Pain     Mostly having issues with left knee        This is a 79 y.o. male who presents for evaluation of left knee pain.  Patient states has had problems on and off of his left knee for many years, about 15 years ago he had a cortisone injection.  This helped for a while but his knee has gotten worse lately in the last few months.  Sharp pain over the medial knee worse with walking proving with rest.  He is never had surgery on the knee.  Numbness or tingling no fevers or chills no shooting pain down the leg.  Does not take any medications.    Physical Exam    There has been no interval change in this patient's past medical, surgical, medications, allergies, family history or social history since the most recent visit to a provider within our department. 14 point review of systems was performed, reviewed, and negative except for pertinent positives documented in the history of present illness.     Constitutional: well developed, well nourished male in no acute distress  Psychiatric: normal mood, appropriate affect  Eyes: sclera anicteric  HENT: normocephalic/atraumatic  CV: regular rate and rhythm   Respiratory: non labored breathing  Integumentary: no rash  Neurological: moves all extremities    Left knee exam:  skin intact no lacerations or abrations. no effusion.  Tender medial joint line. negative log roll negative patellar grind. ROM 0-120. stable to varus and valgus stress at 0 and 30 degrees. negative lachman negative posterior drawer negative lei. 5/5 ehl/fhl/gs/ta. silt s/s/sp/dp/t. 2+ dp/pt        Xrays were ordered by me, they were reviewed and independently interpreted by me today, they show to severe degenerative changes bone-on-bone arthritis the medial compartment    L Inj/Asp: L knee on 7/16/2024 8:49 AM  Indications: pain and joint swelling  Details: 22 G needle, anterolateral approach  Medications: 1 mL triamcinolone acetonide 40 mg/mL    Discussion:  I discussed the conservative treatment options for knee osteoarthritis including but not limited to physical therapy, oral NSAIDS, activity and lifestyle modification, and corticosteroid injections. Pt has elected to undergo a cortisone injection today. I have explained the risk and benefits of an injection including the possibility of joint infection, bleeding, damage to cartilage, allergic reaction. Patient verbalized understanding and gave verbal consent wishes to proceed with a intra-articular cortisone injection for their knee.    Procedure:  After discussing the risk and benefits of the procedure, we proceeded with an intra-articular left knee injection. We discussed the risks and benefits and potential morbidity related to the treatment, and to the prescription medication administered in the injection    With the patient's informed verbal consent, the left knee was prepped in standard sterile fashion with Chlorhexidine. The skin was then anesthetized with ethyl chloride spray and cleaned again with Chlorhexidine. The knee was then apirated/injected with a prefilled 20-gauge syringe of 40 mg Kenalog + 4 ml Lidocaine using the lateral approach without complications.  The patient tolerated this well and felt immediate initial relief of symptoms. A  bandaid was applied and the patient ambulated out of the clinic on ther own accord without difficulty. Patient was instructed to avoid physical activity for 24-48 hours to prevent the knees from swelling and may ice the knees as tolerated. Patient should contact the office if any signs of of infection appear: redness, fever, chills, drainage, swelling or warmth to the knees.  Pt understands that the injections can be repeated no sooner than 3 months.  Procedure, treatment alternatives, risks and benefits explained, specific risks discussed. Consent was given by the patient. Immediately prior to procedure a time out was called to verify the correct patient, procedure, equipment, support staff and site/side marked as required. Patient was prepped and draped in the usual sterile fashion.             Impression/Plan: This is a 79 y.o. male with left knee arthritis.  I had an in depth discussion with the patient regarding treatment options for arthritis and their relative risks and benefits. We reviewed surgical and nonsurgical option for treatment. Treatments include anti inflammatory medications, physical therapy, weight loss, activity modification, use of assistive devices, injection therapies. We discussed current prescriptions and risks and benefits of continuation of prescription medication as apporpriate. We discussed that arthritis is often progressive over time, an in end stage arthritis surgical interventions can be considered, including arthroplasty. All questions were answered and the patient voiced their understanding.  Will start with nonsurgical treatments I will see him back as needed    BMI Readings from Last 1 Encounters:   06/03/24 26.12 kg/m²      Lab Results   Component Value Date    CREATININE 1.00 03/22/2024     Tobacco Use: Medium Risk (7/16/2024)    Patient History    • Smoking Tobacco Use: Former    • Smokeless Tobacco Use: Never    • Passive Exposure: Not on file      Computed MELD 3.0  "unavailable. One or more values for this score either were not found within the given timeframe or did not fit some other criterion.  Computed MELD-Na unavailable. One or more values for this score either were not found within the given timeframe or did not fit some other criterion.       No results found for: \"HGBA1C\"  No results found for: \"STAPHMRSASCR\"  "

## 2024-08-14 DIAGNOSIS — E78.2 MIXED HYPERLIPIDEMIA: ICD-10-CM

## 2024-08-14 RX ORDER — ROSUVASTATIN CALCIUM 10 MG/1
10 TABLET, COATED ORAL DAILY
Qty: 90 TABLET | Refills: 3 | Status: SHIPPED | OUTPATIENT
Start: 2024-08-14 | End: 2025-08-14

## 2024-11-19 ENCOUNTER — HOSPITAL ENCOUNTER (OUTPATIENT)
Dept: RADIOLOGY | Facility: CLINIC | Age: 79
Discharge: HOME | End: 2024-11-19
Payer: MEDICARE

## 2024-11-19 DIAGNOSIS — I35.0 AORTIC VALVE STENOSIS, ETIOLOGY OF CARDIAC VALVE DISEASE UNSPECIFIED: ICD-10-CM

## 2024-11-19 DIAGNOSIS — R06.09 DOE (DYSPNEA ON EXERTION): ICD-10-CM

## 2024-11-19 PROCEDURE — 75571 CT HRT W/O DYE W/CA TEST: CPT

## 2024-11-25 ENCOUNTER — APPOINTMENT (OUTPATIENT)
Dept: ORTHOPEDIC SURGERY | Facility: HOSPITAL | Age: 79
End: 2024-11-25
Payer: MEDICARE

## 2024-11-27 ENCOUNTER — OFFICE VISIT (OUTPATIENT)
Dept: ORTHOPEDIC SURGERY | Facility: HOSPITAL | Age: 79
End: 2024-11-27
Payer: MEDICARE

## 2024-11-27 DIAGNOSIS — M17.12 ARTHRITIS OF LEFT KNEE: Primary | ICD-10-CM

## 2024-11-27 PROBLEM — N52.9 MALE ERECTILE DISORDER: Status: RESOLVED | Noted: 2023-02-07 | Resolved: 2024-11-27

## 2024-11-27 PROCEDURE — 1159F MED LIST DOCD IN RCRD: CPT | Performed by: STUDENT IN AN ORGANIZED HEALTH CARE EDUCATION/TRAINING PROGRAM

## 2024-11-27 PROCEDURE — 1123F ACP DISCUSS/DSCN MKR DOCD: CPT | Performed by: STUDENT IN AN ORGANIZED HEALTH CARE EDUCATION/TRAINING PROGRAM

## 2024-11-27 PROCEDURE — 2500000004 HC RX 250 GENERAL PHARMACY W/ HCPCS (ALT 636 FOR OP/ED): Performed by: STUDENT IN AN ORGANIZED HEALTH CARE EDUCATION/TRAINING PROGRAM

## 2024-11-27 PROCEDURE — 99215 OFFICE O/P EST HI 40 MIN: CPT | Performed by: STUDENT IN AN ORGANIZED HEALTH CARE EDUCATION/TRAINING PROGRAM

## 2024-11-27 PROCEDURE — 99205 OFFICE O/P NEW HI 60 MIN: CPT | Performed by: STUDENT IN AN ORGANIZED HEALTH CARE EDUCATION/TRAINING PROGRAM

## 2024-11-27 RX ORDER — LIDOCAINE HYDROCHLORIDE 10 MG/ML
8 INJECTION, SOLUTION INFILTRATION; PERINEURAL
Status: COMPLETED | OUTPATIENT
Start: 2024-11-27 | End: 2024-11-27

## 2024-11-27 RX ORDER — TRIAMCINOLONE ACETONIDE 40 MG/ML
80 INJECTION, SUSPENSION INTRA-ARTICULAR; INTRAMUSCULAR
Status: COMPLETED | OUTPATIENT
Start: 2024-11-27 | End: 2024-11-27

## 2024-11-27 RX ORDER — BUPIVACAINE HYDROCHLORIDE 5 MG/ML
4 INJECTION, SOLUTION PERINEURAL
Status: COMPLETED | OUTPATIENT
Start: 2024-11-27 | End: 2024-11-27

## 2024-11-27 ASSESSMENT — PAIN DESCRIPTION - DESCRIPTORS: DESCRIPTORS: ACHING;THROBBING;SHARP

## 2024-11-27 ASSESSMENT — PAIN - FUNCTIONAL ASSESSMENT: PAIN_FUNCTIONAL_ASSESSMENT: 0-10

## 2024-11-27 ASSESSMENT — PAIN SCALES - GENERAL: PAINLEVEL_OUTOF10: 5 - MODERATE PAIN

## 2024-11-27 NOTE — LETTER
2024     Willy Irizarry MD  [address] [facility]  [facility] [address]    Patient: Montana Gagnon   YOB: 1945   Date of Visit: 2024       Dear Dr. Willy Irizarry MD:    Thank you for referring Montana Gagnon to me for evaluation. Below are my notes for this consultation.  If you have questions, please do not hesitate to call me. I look forward to following your patient along with you.       Sincerely,     Corie Barclay MD      CC: No Recipients  ______________________________________________________________________________________     Corie Barclay MD   Adult Reconstruction and Joint Replacement Surgery  Phone: [phone]     Fax: [phone]       Name: Montana Gagnon  Age: 79 y.o.   : 1945   Date of Visit: 2024    INITIAL CONSULTATION    CC: Left knee pain    Clinical History:  This patient presents with 15 years of LEFT knee pain.     Patient has tried the following Ice, Activity modification, Corticosteroid injections , and Xray. Date of last steroid injection: 2024, Dr. Fredi Escobar. Patient does not have pain at night. Patient does not report falls related to this problem. Patient is able to walk unlimited blocks. Patient is currently using nothing as assistive device. Primarily complains of medial pain. Patient has difficulty with walking , getting up from a chair, and walking on unlevel surfaces . The pain is significantly impacting their ability to perform activities of daily living. Patient reports no longer able to do activities such as walking without pain.     Focused History  PMH: Reviewed and PE/DVT: no. Severe aortic stenosis.   PSH: Reviewed , Hip/Knee replacement: no, Hip/Knee surgery: no, Anesthesia complications: no, Spine surgery: no, Surgical infection: no, and Weight loss surgery: no  Meds: Reviewed, Current Anticoagulants: no, Weight loss medication: no, and Current Opioids: no  Allergies: Reviewed  and The patient reports  no contraindications or allergies to cephalosporins, aspirin, NSAIDs or opioids, except as noted above.  FH: No family history of any bleeding or clotting disorders.  SHx: Reviewed, Occupation: self employed, Current smoker: no, EtOH intake weekly: 1-2 glasses, Social support: Wife, and Preferred physical activities: recumbent bike  Dental Hx: Last routine cleaning: Oct 2024 and Discussed that all invasive dental work must be completed at least 3 months prior to joint replacement surgery. Patient understands they are to avoid any invasive dental work 3-6 months post-surgically.   Jehovah´s Witness: no    PROMs/HISTORY  PROMs   No questionnaires on file.     Past Medical History:   Diagnosis Date   • CAP (community acquired pneumonia) 02/07/2023   • Headache, unspecified 01/16/2019    Chronic headaches   • Other conditions influencing health status     Lumbar Neuritis L5 - S1   • Other intervertebral disc degeneration, lumbar region     Bulging lumbar disc   • Other intervertebral disc degeneration, lumbar region     Disc degeneration, lumbar   • Personal history of other diseases of male genital organs     History of undescended testicle   • Radiculopathy, lumbar region     Lumbar radiculopathy       Past Medical History:   Diagnosis Date   • CAP (community acquired pneumonia) 02/07/2023   • Headache, unspecified 01/16/2019    Chronic headaches   • Other conditions influencing health status     Lumbar Neuritis L5 - S1   • Other intervertebral disc degeneration, lumbar region     Bulging lumbar disc   • Other intervertebral disc degeneration, lumbar region     Disc degeneration, lumbar   • Personal history of other diseases of male genital organs     History of undescended testicle   • Radiculopathy, lumbar region     Lumbar radiculopathy     Documented in chart and reviewed.     Past Surgical History:   Procedure Laterality Date   • OTHER SURGICAL HISTORY  06/25/2013    Nerve Block Transforaminal Epidural   • OTHER  SURGICAL HISTORY  06/25/2013    Nerve Block Transforaminal Epidural Lumbar   • OTHER SURGICAL HISTORY  12/17/2018    Lower back surgery   • OTHER SURGICAL HISTORY  12/17/2018    Orchiectomy       Allergies: He has No Known Allergies.     Medications:  Current Outpatient Medications   Medication Instructions   • hydroCHLOROthiazide (HYDRODIURIL) 25 mg, oral, Daily   • latanoprost (Xalatan) 0.005 % ophthalmic solution 1 drop, Nightly   • multivitamin tablet 1 tablet, Daily   • rosuvastatin (CRESTOR) 10 mg, oral, Daily       Family History   Problem Relation Name Age of Onset   • Heart attack Mother  84     Documented in chart and reviewed.     Social History     Tobacco Use   • Smoking status: Former     Types: Cigarettes   • Smokeless tobacco: Never   Substance Use Topics   • Alcohol use: Yes     Alcohol/week: 1.0 - 2.0 standard drink of alcohol     Types: 1 - 2 Glasses of wine per week        Review of Systems: Review of systems completed with medical assistant intake. Please refer to this note.     Physical Exam:  BMI: 26.    General: The patient is well appearing and has an appropriate affect.     Neurological Examination: SILT in SPN/DPN/Sural/Saphenous/Tibial nerves. 5/5 EHL, FHL, Tibial anterior, Gastrocnemius. Coordination grossly intact.     Cardiovascular Exam: Capillary refill <2 seconds.     Lymphatic Examination: There is no obvious lymphatic swelling present around the involved joint.    Skin Exam: Skin around the pertinent joint is without evidence of infection or rash.    Gait: The patient ambulates with an antalgic gait.     Right Hip Examination:  The skin is intact over the hip.    There is no tenderness over the greater trochanter.    Range of motion is full extension to 100 degrees of flexion.    The hip is stable without subluxation or dislocation.    The hip internally rotates to 15 degrees and externally rotates to 45 degrees.    There is no pain with hip motion.    Left Hip Examination:  The  "skin is intact over the hip.    There is no tenderness over the greater trochanter.    Range of motion is full extension to 100 degrees of flexion.    The hip is stable without subluxation or dislocation.    The hip internally rotates to 15 degrees and externally rotates to 45 degrees.    There is no pain with hip motion.    Left Knee Examination:  Examination of the left knee reveals the skin to be intact.    There is a moderate effusion in the knee.    The alignment of the knee is Varus.    This deformity is partially correctable.    There is tenderness to palpation over the joint line.    There is significant quadriceps atrophy.    Range of Motion: 10 to 115 degrees of flexion.    The knee is stable to varus-valgus stress and anterior-posterior stress.     There is moderate grinding with range of motion.    There is mild patellofemoral crepitus.    Right Knee Examination:  Examination of the right knee reveals the skin to be intact.     There is no obvious swelling.    There is a no effusion in the knee.     The alignment of the knee is normal.    There is no tenderness to palpation over the joint line.    There is no significant quadriceps atrophy.    Range of motion is full extension to 120 degrees of flexion.    The knee is stable to varus-valgus stress and anterior-posterior stress.     There is no grinding with range of motion.    There is no patellofemoral crepitus.    Prior Labs:   Prior Labs:   Lab Results   Component Value Date    WBC 8.6 03/22/2024    HGB 13.8 03/22/2024    HCT 42.7 03/22/2024    MCV 96 03/22/2024     03/22/2024      No results found for: \"INR\", \"PROTIME\"      Lab Results   Component Value Date    GLUCOSE 102 (H) 03/22/2024    CALCIUM 9.7 03/22/2024     03/22/2024    K 3.8 03/22/2024    CO2 27 03/22/2024     03/22/2024    BUN 21 03/22/2024    CREATININE 1.00 03/22/2024      No results found for: \"CKTOTAL\", \"CKMB\", \"CKMBINDEX\", \"TROPONINI\"   No results found for: " "\"HGBA1C\"      No results found for: \"CRP\"   No results found for: \"SEDRATE\"      Radiographs:  Radiographs were personally reviewed today. There is evidence of severe LEFT knee osteoarthritis with MEDIAL bone on bone apposition.    Impression:  This patient presents with severe LEFT knee osteoarthritis with bone on bone apposition.     Diagnosis:  Arthritis of left knee     Recommendations / Plan:    I have discussed the options in detail with the patient. We have discussed anti-inflammatory medication, activity modification, physical therapy, corticosteroid injections, viscosupplementation injections, partial knee replacement surgery and total knee replacement surgery. The patient has not yet exhausted all conservative treatment measures.    The risks and benefits of all these treatment options have been discussed in detail.     The patient has tried at least 3 months of the above conservative treatments and continues to have disabling pain, impaired activities of daily living and worsened quality of life.  Reviewed the surgical optimization steps to optimize their chances for a successful joint replacement surgery.      Patient will continue their home exercise program.  Physical therapy referral could be considered in the future. Strategies for pain management using over-the-counter anti-inflammatory medications reviewed.  The patient elects for a steroid injection, which was provided according to procedure note below. The patient was fit for a brace today -custom medial  brace measurements performed. Encouraged them to maintain range of motion and strength around the knee joints.  They will continue to implement these strategies in addressing their pain.      Recommend the patient continue optimizing nonsurgical treatment interventions as outlined above for management of their knee arthritis.  I would be happy to see them again at any point to discuss surgery if they are more optimized or to review " progress with nonsurgical treatment of arthritis. The patient verbalizes understanding with the recommendations and treatment plan as outlined above and is in agreement.  Questions were addressed.    Large Joint Injection 20610: Knee  Consent given by: Patient  Timeout: Immediately prior to procedure the correct patient, procedure, and site was verified. Sterile gloves and semi-sterile technique were used.   Indications: Knee pain and joint swelling.   Location: LEFT knee  Needle size: 22 G  Approach: Lateral    Medications administered: please see medical assistant note for lot number and expiration date.   Subcutaneous   4 ml of 1% lidocaine     Deep   4 ml of 1% lidocaine   4 mL 0.5% marcaine   2 mL of 40 mg kenalog     Patient tolerance: Dressing applied. Patient tolerated the procedure well with no immediate complications.    L Inj/Asp on 11/27/2024 2:57 PM  Medications: 80 mg triamcinolone acetonide 40 mg/mL; 8 mL lidocaine 10 mg/mL (1 %); 4 mL BUPivacaine HCl 0.5 % (5 mg/mL)          _____________  Corie Barclay MD   Attending Orthopaedic Surgeon  [facility]    [facility]     This office note was transcribed with dictation software.  Please excuse any typographical errors, program misunderstandings leading to inadvertent insertions or deletions of inappropriate wording, pronoun errors and other unintentional transcription errors not noticed on proof-reading.

## 2024-11-27 NOTE — PROGRESS NOTES
Corie Barclay MD   Adult Reconstruction and Joint Replacement Surgery  Phone: [phone]     Fax: [phone]       Name: Montana Gagnon  Age: 79 y.o.   : 1945   Date of Visit: 2024    INITIAL CONSULTATION    CC: Left knee pain    Clinical History:  This patient presents with 15 years of LEFT knee pain.     Patient has tried the following Ice, Activity modification, Corticosteroid injections , and Xray. Date of last steroid injection: 2024, Dr. Fredi Escobar. Patient does not have pain at night. Patient does not report falls related to this problem. Patient is able to walk unlimited blocks. Patient is currently using nothing as assistive device. Primarily complains of medial pain. Patient has difficulty with walking , getting up from a chair, and walking on unlevel surfaces . The pain is significantly impacting their ability to perform activities of daily living. Patient reports no longer able to do activities such as walking without pain.     Focused History  PMH: Reviewed and PE/DVT: no. Severe aortic stenosis.   PSH: Reviewed , Hip/Knee replacement: no, Hip/Knee surgery: no, Anesthesia complications: no, Spine surgery: no, Surgical infection: no, and Weight loss surgery: no  Meds: Reviewed, Current Anticoagulants: no, Weight loss medication: no, and Current Opioids: no  Allergies: Reviewed  and The patient reports no contraindications or allergies to cephalosporins, aspirin, NSAIDs or opioids, except as noted above.  FH: No family history of any bleeding or clotting disorders.  SHx: Reviewed, Occupation: self employed, Current smoker: no, EtOH intake weekly: 1-2 glasses, Social support: Wife, and Preferred physical activities: recumbent bike  Dental Hx: Last routine cleaning: Oct 2024 and Discussed that all invasive dental work must be completed at least 3 months prior to joint replacement surgery. Patient understands they are to avoid any invasive dental work 3-6 months post-surgically.    Chai´s Witness: no    PROMs/HISTORY  PROMs   No questionnaires on file.     Past Medical History:   Diagnosis Date    CAP (community acquired pneumonia) 02/07/2023    Headache, unspecified 01/16/2019    Chronic headaches    Other conditions influencing health status     Lumbar Neuritis L5 - S1    Other intervertebral disc degeneration, lumbar region     Bulging lumbar disc    Other intervertebral disc degeneration, lumbar region     Disc degeneration, lumbar    Personal history of other diseases of male genital organs     History of undescended testicle    Radiculopathy, lumbar region     Lumbar radiculopathy       Past Medical History:   Diagnosis Date    CAP (community acquired pneumonia) 02/07/2023    Headache, unspecified 01/16/2019    Chronic headaches    Other conditions influencing health status     Lumbar Neuritis L5 - S1    Other intervertebral disc degeneration, lumbar region     Bulging lumbar disc    Other intervertebral disc degeneration, lumbar region     Disc degeneration, lumbar    Personal history of other diseases of male genital organs     History of undescended testicle    Radiculopathy, lumbar region     Lumbar radiculopathy     Documented in chart and reviewed.     Past Surgical History:   Procedure Laterality Date    OTHER SURGICAL HISTORY  06/25/2013    Nerve Block Transforaminal Epidural    OTHER SURGICAL HISTORY  06/25/2013    Nerve Block Transforaminal Epidural Lumbar    OTHER SURGICAL HISTORY  12/17/2018    Lower back surgery    OTHER SURGICAL HISTORY  12/17/2018    Orchiectomy       Allergies: He has No Known Allergies.     Medications:  Current Outpatient Medications   Medication Instructions    hydroCHLOROthiazide (HYDRODIURIL) 25 mg, oral, Daily    latanoprost (Xalatan) 0.005 % ophthalmic solution 1 drop, Nightly    multivitamin tablet 1 tablet, Daily    rosuvastatin (CRESTOR) 10 mg, oral, Daily       Family History   Problem Relation Name Age of Onset    Heart attack Mother  84      Documented in chart and reviewed.     Social History     Tobacco Use    Smoking status: Former     Types: Cigarettes    Smokeless tobacco: Never   Substance Use Topics    Alcohol use: Yes     Alcohol/week: 1.0 - 2.0 standard drink of alcohol     Types: 1 - 2 Glasses of wine per week        Review of Systems: Review of systems completed with medical assistant intake. Please refer to this note.     Physical Exam:  BMI: 26.    General: The patient is well appearing and has an appropriate affect.     Neurological Examination: SILT in SPN/DPN/Sural/Saphenous/Tibial nerves. 5/5 EHL, FHL, Tibial anterior, Gastrocnemius. Coordination grossly intact.     Cardiovascular Exam: Capillary refill <2 seconds.     Lymphatic Examination: There is no obvious lymphatic swelling present around the involved joint.    Skin Exam: Skin around the pertinent joint is without evidence of infection or rash.    Gait: The patient ambulates with an antalgic gait.     Right Hip Examination:  The skin is intact over the hip.    There is no tenderness over the greater trochanter.    Range of motion is full extension to 100 degrees of flexion.    The hip is stable without subluxation or dislocation.    The hip internally rotates to 15 degrees and externally rotates to 45 degrees.    There is no pain with hip motion.    Left Hip Examination:  The skin is intact over the hip.    There is no tenderness over the greater trochanter.    Range of motion is full extension to 100 degrees of flexion.    The hip is stable without subluxation or dislocation.    The hip internally rotates to 15 degrees and externally rotates to 45 degrees.    There is no pain with hip motion.    Left Knee Examination:  Examination of the left knee reveals the skin to be intact.    There is a moderate effusion in the knee.    The alignment of the knee is Varus.    This deformity is partially correctable.    There is tenderness to palpation over the joint line.    There is  "significant quadriceps atrophy.    Range of Motion: 10 to 115 degrees of flexion.    The knee is stable to varus-valgus stress and anterior-posterior stress.     There is moderate grinding with range of motion.    There is mild patellofemoral crepitus.    Right Knee Examination:  Examination of the right knee reveals the skin to be intact.     There is no obvious swelling.    There is a no effusion in the knee.     The alignment of the knee is normal.    There is no tenderness to palpation over the joint line.    There is no significant quadriceps atrophy.    Range of motion is full extension to 120 degrees of flexion.    The knee is stable to varus-valgus stress and anterior-posterior stress.     There is no grinding with range of motion.    There is no patellofemoral crepitus.    Prior Labs:   Prior Labs:   Lab Results   Component Value Date    WBC 8.6 03/22/2024    HGB 13.8 03/22/2024    HCT 42.7 03/22/2024    MCV 96 03/22/2024     03/22/2024      No results found for: \"INR\", \"PROTIME\"      Lab Results   Component Value Date    GLUCOSE 102 (H) 03/22/2024    CALCIUM 9.7 03/22/2024     03/22/2024    K 3.8 03/22/2024    CO2 27 03/22/2024     03/22/2024    BUN 21 03/22/2024    CREATININE 1.00 03/22/2024      No results found for: \"CKTOTAL\", \"CKMB\", \"CKMBINDEX\", \"TROPONINI\"   No results found for: \"HGBA1C\"      No results found for: \"CRP\"   No results found for: \"SEDRATE\"      Radiographs:  Radiographs were personally reviewed today. There is evidence of severe LEFT knee osteoarthritis with MEDIAL bone on bone apposition.    Impression:  This patient presents with severe LEFT knee osteoarthritis with bone on bone apposition.     Diagnosis:  Arthritis of left knee     Recommendations / Plan:    I have discussed the options in detail with the patient. We have discussed anti-inflammatory medication, activity modification, physical therapy, corticosteroid injections, viscosupplementation injections, " partial knee replacement surgery and total knee replacement surgery. The patient has not yet exhausted all conservative treatment measures.    The risks and benefits of all these treatment options have been discussed in detail.     The patient has tried at least 3 months of the above conservative treatments and continues to have disabling pain, impaired activities of daily living and worsened quality of life.  Reviewed the surgical optimization steps to optimize their chances for a successful joint replacement surgery.      Patient will continue their home exercise program.  Physical therapy referral could be considered in the future. Strategies for pain management using over-the-counter anti-inflammatory medications reviewed.  The patient elects for a steroid injection, which was provided according to procedure note below. The patient was fit for a brace today -custom medial  brace measurements performed. Encouraged them to maintain range of motion and strength around the knee joints.  They will continue to implement these strategies in addressing their pain.      Recommend the patient continue optimizing nonsurgical treatment interventions as outlined above for management of their knee arthritis.  I would be happy to see them again at any point to discuss surgery if they are more optimized or to review progress with nonsurgical treatment of arthritis. The patient verbalizes understanding with the recommendations and treatment plan as outlined above and is in agreement.  Questions were addressed.    Large Joint Injection 15732: Knee  Consent given by: Patient  Timeout: Immediately prior to procedure the correct patient, procedure, and site was verified. Sterile gloves and semi-sterile technique were used.   Indications: Knee pain and joint swelling.   Location: LEFT knee  Needle size: 22 G  Approach: Lateral    Medications administered: please see medical assistant note for lot number and expiration date.    Subcutaneous   4 ml of 1% lidocaine     Deep   4 ml of 1% lidocaine   4 mL 0.5% marcaine   2 mL of 40 mg kenalog     Patient tolerance: Dressing applied. Patient tolerated the procedure well with no immediate complications.    L Inj/Asp on 11/27/2024 2:57 PM  Medications: 80 mg triamcinolone acetonide 40 mg/mL; 8 mL lidocaine 10 mg/mL (1 %); 4 mL BUPivacaine HCl 0.5 % (5 mg/mL)          _____________  Corie Barcaly MD   Attending Orthopaedic Surgeon  [facility]    [facility]     This office note was transcribed with dictation software.  Please excuse any typographical errors, program misunderstandings leading to inadvertent insertions or deletions of inappropriate wording, pronoun errors and other unintentional transcription errors not noticed on proof-reading.

## 2024-12-02 ENCOUNTER — OFFICE VISIT (OUTPATIENT)
Dept: CARDIOLOGY | Facility: CLINIC | Age: 79
End: 2024-12-02
Payer: MEDICARE

## 2024-12-02 VITALS
RESPIRATION RATE: 18 BRPM | BODY MASS INDEX: 27.28 KG/M2 | HEART RATE: 69 BPM | WEIGHT: 180 LBS | HEIGHT: 68 IN | DIASTOLIC BLOOD PRESSURE: 66 MMHG | OXYGEN SATURATION: 95 % | SYSTOLIC BLOOD PRESSURE: 126 MMHG

## 2024-12-02 DIAGNOSIS — I35.0 MODERATE AORTIC STENOSIS BY PRIOR ECHOCARDIOGRAM: ICD-10-CM

## 2024-12-02 DIAGNOSIS — E78.5 HYPERLIPIDEMIA, UNSPECIFIED HYPERLIPIDEMIA TYPE: ICD-10-CM

## 2024-12-02 DIAGNOSIS — I25.84 CORONARY ATHEROSCLEROSIS DUE TO SEVERELY CALCIFIED CORONARY LESION: Primary | ICD-10-CM

## 2024-12-02 PROCEDURE — 1123F ACP DISCUSS/DSCN MKR DOCD: CPT | Performed by: STUDENT IN AN ORGANIZED HEALTH CARE EDUCATION/TRAINING PROGRAM

## 2024-12-02 PROCEDURE — 1160F RVW MEDS BY RX/DR IN RCRD: CPT | Performed by: STUDENT IN AN ORGANIZED HEALTH CARE EDUCATION/TRAINING PROGRAM

## 2024-12-02 PROCEDURE — G2211 COMPLEX E/M VISIT ADD ON: HCPCS | Performed by: STUDENT IN AN ORGANIZED HEALTH CARE EDUCATION/TRAINING PROGRAM

## 2024-12-02 PROCEDURE — 99215 OFFICE O/P EST HI 40 MIN: CPT | Performed by: STUDENT IN AN ORGANIZED HEALTH CARE EDUCATION/TRAINING PROGRAM

## 2024-12-02 PROCEDURE — 3074F SYST BP LT 130 MM HG: CPT | Performed by: STUDENT IN AN ORGANIZED HEALTH CARE EDUCATION/TRAINING PROGRAM

## 2024-12-02 PROCEDURE — 1159F MED LIST DOCD IN RCRD: CPT | Performed by: STUDENT IN AN ORGANIZED HEALTH CARE EDUCATION/TRAINING PROGRAM

## 2024-12-02 PROCEDURE — 3078F DIAST BP <80 MM HG: CPT | Performed by: STUDENT IN AN ORGANIZED HEALTH CARE EDUCATION/TRAINING PROGRAM

## 2024-12-02 RX ORDER — ROSUVASTATIN CALCIUM 20 MG/1
20 TABLET, COATED ORAL DAILY
Qty: 90 TABLET | Refills: 3 | Status: SHIPPED | OUTPATIENT
Start: 2024-12-02 | End: 2025-12-02

## 2024-12-02 ASSESSMENT — ENCOUNTER SYMPTOMS
LOSS OF SENSATION IN FEET: 0
OCCASIONAL FEELINGS OF UNSTEADINESS: 0
DEPRESSION: 0

## 2024-12-02 NOTE — PATIENT INSTRUCTIONS
Dear Montana Gagnon,    It was a pleasure meeting you today at the Cardiology office. As we dicussed, your clinical condition is moderate aortic stenosis and severe valve calcification. I recommended starting low dose aspirin (81 mg once a day) and doubling your dose of rosuvastatin to 20 mg every day. We will define the timing of the follow-up echocardiogram depending on your valve score. Please follow up with me in the Cardiology office once your results are available so we can discuss them.    Sincerely,     Prem Stover MD

## 2024-12-02 NOTE — ASSESSMENT & PLAN NOTE
- Increase Crestor dose to 20 mg every day for LDL <70 in context of severe coronary calcifications

## 2024-12-02 NOTE — PROGRESS NOTES
"[place] [facility] [place] [address] [place] [address]   Type of Visit: Established - Last Seen: 6/3/2024     Chief Complaint:  Patient was referred to Cardiology by Dr. Irizarry for aortic stenosis.    History Of Present Illness:    Montana Gagnon \"Nayana" is a 79 y.o. male, with history significant for HTN, HLD, recently diagnosed moderate AS on a bicuspid AV, mild aorta dilation with STJ effacement and mild LVH, who visits Cardiology today as a follow up visit  for AS. On his last assessment he reported dyspnea on exertion in the last 6 months when increasing his workout intensity, today he refers that he is able to perform all his daily activities without limitations although he is not doing exercise. He underwent a coronary calcium score which showed presence of severe coronary calcifications (608 LAD>LM) and severe AV plane calcifications (2566 AU valve score). Stress test showed normal exercise capacity (8.3 METS) with normal blood pressure response but limited by dyspnea and dizziness. There were no ST changes reported. BNP was normal at 35.     Patient denies chest pain, orthopnea, PND, nocturia, edema, palpitations, dizziness, lightheadedness, syncope, or claudication.     Blood pressure: 126/66 mmHg  HR: 69 bpm    Last EKG from 6/3/2024 showed sinus rhythm at 70 bpm, normal AV conduction, possible LVH, nonspecific ventricular repolarization abnormalities.     Past Medical History:  He has a past medical history of CAP (community acquired pneumonia) (02/07/2023), Headache, unspecified (01/16/2019), Other conditions influencing health status, Other intervertebral disc degeneration, lumbar region, Other intervertebral disc degeneration, lumbar region, Personal history of other diseases of male genital organs, and Radiculopathy, lumbar region.    Past Surgical History:  He has a past surgical history that includes Other surgical history (06/25/2013); Other surgical history (06/25/2013); Other surgical history (12/17/2018); and " "Other surgical history (12/17/2018).    Social History:  He reports that he has quit smoking. His smoking use included cigarettes. He has never used smokeless tobacco. He reports current alcohol use of about 1.0 - 2.0 standard drink of alcohol per week. He reports that he does not use drugs.    Family History:  Family History   Problem Relation Name Age of Onset    Heart attack Mother  84     Allergies:  Patient has no known allergies.    Outpatient Medications:  Current Outpatient Medications   Medication Instructions    hydroCHLOROthiazide (HYDRODIURIL) 25 mg, oral, Daily    latanoprost (Xalatan) 0.005 % ophthalmic solution 1 drop, Nightly    multivitamin tablet 1 tablet, Daily    rosuvastatin (CRESTOR) 10 mg, oral, Daily     Last Recorded Vitals:  Vitals:    12/02/24 0754   BP: 126/66   BP Location: Left arm   Patient Position: Sitting   Pulse: 69   Resp: 18   SpO2: 95%   Weight: 81.6 kg (180 lb)   Height: 1.727 m (5' 8\")     Physical Exam:      12/2/2024     7:54 AM 6/3/2024     8:18 AM 3/22/2024     8:22 AM 3/21/2023     2:27 PM 1/24/2023     3:36 PM 1/12/2022     2:09 PM   Vitals   Systolic 126 134 119 106 124 122   Diastolic 66 81 72 70 73 70   BP Location Left arm Left arm       Heart Rate 69 70 79 88 81 73   Temp   36.3 °C (97.3 °F) 36.6 °C (97.9 °F) 38.2 °C (100.7 °F) 36.1 °C (97 °F)   Resp 18 18       Height 1.727 m (5' 8\") 1.74 m (5' 8.5\") 1.74 m (5' 8.5\") 1.74 m (5' 8.5\")  1.74 m (5' 8.5\")   Weight (lb) 180 174.3 180 176 176 188   BMI 27.37 kg/m2 26.12 kg/m2 26.97 kg/m2 26.37 kg/m2 26.37 kg/m2 28.17 kg/m2   BSA (m2) 1.98 m2 1.96 m2 1.99 m2 1.96 m2 1.96 m2 2.03 m2   Visit Report Report Report Report Report       Wt Readings from Last 5 Encounters:   12/02/24 81.6 kg (180 lb)   06/03/24 79.1 kg (174 lb 4.8 oz)   03/22/24 81.6 kg (180 lb)   03/21/23 79.8 kg (176 lb)   01/24/23 79.8 kg (176 lb)     General: Sitting up comfortably in chair; in no apparent distress.  HEENT: Normocephalic; atraumatic. Well " hydrated.  Eyes: Anicteric sclera. Extraocular movement intact.  Neck: Supple; no thyromegaly; normal jugular venous pressure, bilateral bruits.  Respiratory: Bilateral air entry equal. No wheezing.  Cardiovascular: Normal S1, S2; systolic aortic murmurs 3/4.  Abdomen: Nondistended; nontender. (+) bowel sounds.  Extremities: No peripheral edema present. Pulses 2+ diffusely.  Neurological: Oriented to time, place, and person; nonfocal.  Psychiatric: Normal affect.     Last Labs Reviewed:  CBC -  Recent Labs     03/22/24  0902 03/21/23  1512 01/12/22  1450 01/11/21  0000 12/17/18  1522   WBC 8.6 8.3 8.7 8.9 7.5   HGB 13.8 14.3 15.6 14.8 14.8   HCT 42.7 45.4 48.1 44.7 45.7    265 247 227 207   MCV 96 94 94 93 95     CMP -  Recent Labs     03/22/24  0902 03/21/23  1512 01/12/22  1450 01/11/21  0000 12/17/18  1522    140 142 142 138   K 3.8 3.3* 3.6 3.9 3.7    103 104 103 105   CO2 27 27 29 31 25   ANIONGAP 14 13 13 12 12   BUN 21 18 20 20 15   CREATININE 1.00 1.12 0.91 1.04 0.90   EGFR 76  --   --   --   --    CALCIUM 9.7 9.7 10.0 9.6 9.8     Recent Labs     03/22/24  0902 03/21/23  1512 01/12/22  1450 01/11/21  0000 12/17/18  1522   ALBUMIN 4.1 4.1 4.3 4.1 4.3   ALKPHOS 45 47 58 49 54   ALT 22 20 29 18 19   AST 18 19 23 22 19   BILITOT 0.8 0.6 0.5 0.5 0.7     LIPID PANEL -   Recent Labs     03/22/24  0902 03/21/23  1512 01/12/22  1450 01/11/21  0000   CHOL 162 249* 238* 208*   LDLF  --  153* 146* 92   LDLCALC 72  --   --   --    HDL 76.2 72.3 65.0 48.0   TRIG 69 117 134 342*     CARDIOVASCULAR  Recent Labs     06/07/24  0858   BNP 35     Last Cardiology/Imaging Tests Personally Reviewed (if images available) and Interpreted:  ECG:  Encounter Date: 06/03/24   ECG 12 lead (Clinic Performed)   Result Value    Ventricular Rate 70    Atrial Rate 70    HI Interval 194    QRS Duration 82    QT Interval 402    QTC Calculation(Bazett) 434    P Axis 65    R Axis 13    T Axis 24    QRS Count 11    Q Onset 225     P Onset 128    P Offset 187    T Offset 426    QTC Fredericia 423    Narrative    Normal sinus rhythm  Minimal voltage criteria for LVH, may be normal variant  Nonspecific ST abnormality     Echocardiogram:  Transthoracic Echo Complete 05/02/2024   1. Left ventricular systolic function is low normal with a 50-55% estimated ejection fraction.   2. There is mild asymmetric left ventricular hypertrophy.   3. Moderate aortic valve stenosis.   4. There is moderate aortic valve cusp calcification.   5. Mild aortic valve regurgitation.    Cath:  No results found.    Stress Test:  Stress Test 06/07/2024  - The patient exercised to stage III on a Percy protocol for 7 minutes and 00 seconds, achieving 8.3 METS.  - The peak heart rate achieved was 131 bpm, which was 94% of the age predicted target heart rate of 140 bpm.   - Normal blood pressure response.  - The patient developed shortness of breath and dizziness during the stress exam.   - No clinical or electrocardiographic evidence for ischemia at a maximal workload.     Cardiac CT/MRI:  CT cardiac scoring wo IV contrast 11/19/2024  FINDINGS:  The score and distribution of calcium in the coronary arteries is as follows:  .23,  .77,  LCx 0,  RCA 55.06,  Total 608.06    The visualized mid/lower ascending thoracic aorta measures 4.1 cm in diameter. The heart is normal in size. No pericardial effusion is present. Severe calcification of the aortic valve.    CV RISK FACTORS:   # Hypertension: Last BP: 126/66.  # Hyperlipidemia: Last Tchol 162 / LDL No results found for requested labs within last 365 days. / HDL 76.2 / TRIG 69 (3/22/2024:  9:02 AM).  # Type II Diabetes Mellitus: Last A1c No results found for requested labs within last 365 days. (No results in last year.).  # Obesity: Last BMI: 27.38.  # CKD: Last BUN/Cr (GFR): 21/1.00 (76), 3/22/2024:  9:02 AM.    ASCV RISK:  The ASCVD Risk score ([place] SCHMIDT, et al., 2019) failed to calculate for the following  reasons:    The 2019 ASCVD risk score is only valid for ages 40 to 79    Assessment:  79 y.o. male, with history significant for HTN, HLD, recently diagnosed moderate AS on a bicuspid AV, mild aorta dilation with STJ effacement and mild LVH, who visits Cardiology today as a follow-up visit for AS. He has questionable symptoms with normal exercise capacity and normal BNP. Coronary CT showed severe coronary calcifications but no AV score which was requested and pending. Stroke volume was normal at 37 ml/m2 but alignment of AV Doppler was suboptimal which may determine the patient has a severe AS and not moderate stenosis with mild LVH.   Assessment & Plan  Hyperlipidemia, unspecified hyperlipidemia type  - Increase Crestor dose to 20 mg every day for LDL <70 in context of severe coronary calcifications  Coronary atherosclerosis due to severely calcified coronary lesion  - Start low dose aspirin (81 mg every day)  - Report if any bleeding occur  - Follow CBC in 6 months  Moderate aortic stenosis by prior echocardiogram  - Repeat limited echo for LVEF and AV gradients in 12 months    Patient will follow up with me in the Cardiology office once the results are available.   I spent 45 minutes assessing the case between pre-charting, face-to-face patient interaction, and documentation.    Prem Stover MD

## 2024-12-09 ENCOUNTER — PHARMACY VISIT (OUTPATIENT)
Dept: PHARMACY | Facility: CLINIC | Age: 79
End: 2024-12-09
Payer: COMMERCIAL

## 2024-12-09 ENCOUNTER — HOSPITAL ENCOUNTER (OUTPATIENT)
Dept: RADIOLOGY | Facility: CLINIC | Age: 79
Discharge: HOME | End: 2024-12-09
Payer: MEDICARE

## 2024-12-09 ENCOUNTER — OFFICE VISIT (OUTPATIENT)
Dept: URGENT CARE | Age: 79
End: 2024-12-09
Payer: MEDICARE

## 2024-12-09 VITALS
OXYGEN SATURATION: 97 % | RESPIRATION RATE: 18 BRPM | SYSTOLIC BLOOD PRESSURE: 136 MMHG | BODY MASS INDEX: 26.46 KG/M2 | WEIGHT: 174 LBS | DIASTOLIC BLOOD PRESSURE: 92 MMHG | HEART RATE: 81 BPM | TEMPERATURE: 98.5 F

## 2024-12-09 DIAGNOSIS — J20.9 ACUTE BRONCHITIS, UNSPECIFIED ORGANISM: Primary | ICD-10-CM

## 2024-12-09 PROCEDURE — 3080F DIAST BP >= 90 MM HG: CPT | Performed by: FAMILY MEDICINE

## 2024-12-09 PROCEDURE — 71046 X-RAY EXAM CHEST 2 VIEWS: CPT

## 2024-12-09 PROCEDURE — 1036F TOBACCO NON-USER: CPT | Performed by: FAMILY MEDICINE

## 2024-12-09 PROCEDURE — 1123F ACP DISCUSS/DSCN MKR DOCD: CPT | Performed by: FAMILY MEDICINE

## 2024-12-09 PROCEDURE — 1160F RVW MEDS BY RX/DR IN RCRD: CPT | Performed by: FAMILY MEDICINE

## 2024-12-09 PROCEDURE — 3075F SYST BP GE 130 - 139MM HG: CPT | Performed by: FAMILY MEDICINE

## 2024-12-09 PROCEDURE — 99204 OFFICE O/P NEW MOD 45 MIN: CPT | Performed by: FAMILY MEDICINE

## 2024-12-09 PROCEDURE — RXMED WILLOW AMBULATORY MEDICATION CHARGE

## 2024-12-09 PROCEDURE — 1159F MED LIST DOCD IN RCRD: CPT | Performed by: FAMILY MEDICINE

## 2024-12-09 PROCEDURE — 71046 X-RAY EXAM CHEST 2 VIEWS: CPT | Performed by: RADIOLOGY

## 2024-12-09 RX ORDER — GUAIFENESIN 600 MG/1
1200 TABLET, EXTENDED RELEASE ORAL 2 TIMES DAILY
Qty: 20 TABLET | Refills: 0 | Status: SHIPPED | OUTPATIENT
Start: 2024-12-09 | End: 2024-12-14

## 2024-12-09 RX ORDER — BENZONATATE 100 MG/1
100 CAPSULE ORAL 3 TIMES DAILY PRN
Qty: 15 CAPSULE | Refills: 0 | Status: SHIPPED | OUTPATIENT
Start: 2024-12-09 | End: 2024-12-14

## 2024-12-09 RX ORDER — AZITHROMYCIN 250 MG/1
TABLET, FILM COATED ORAL
Qty: 6 TABLET | Refills: 0 | Status: SHIPPED | OUTPATIENT
Start: 2024-12-09 | End: 2024-12-14

## 2024-12-09 ASSESSMENT — ENCOUNTER SYMPTOMS
CHEST TIGHTNESS: 0
SINUS PRESSURE: 0
DIZZINESS: 0
FEVER: 0
CHILLS: 0
SWEATS: 0
VOMITING: 0
MYALGIAS: 0
ARTHRALGIAS: 0
APNEA: 0
SORE THROAT: 1
WEIGHT LOSS: 0
CONFUSION: 0
HEADACHES: 0
CHOKING: 0
PALPITATIONS: 0
FATIGUE: 1
SHORTNESS OF BREATH: 0
COUGH: 1
HEMOPTYSIS: 0
NAUSEA: 0
ADENOPATHY: 0
RHINORRHEA: 0
WHEEZING: 0

## 2024-12-09 ASSESSMENT — PATIENT HEALTH QUESTIONNAIRE - PHQ9
2. FEELING DOWN, DEPRESSED OR HOPELESS: NOT AT ALL
1. LITTLE INTEREST OR PLEASURE IN DOING THINGS: NOT AT ALL
SUM OF ALL RESPONSES TO PHQ9 QUESTIONS 1 AND 2: 0

## 2024-12-09 ASSESSMENT — COPD QUESTIONNAIRES: COPD: 0

## 2024-12-09 NOTE — PROGRESS NOTES
Subjective   Patient ID: Montana Gagnon is a 79 y.o. male. They present today with a chief complaint of Cough (Pt c/o of coughing  and yellow phlegm x 8 d days. He had p[pneumonia before states it feels the same.  ).    History of Present Illness    History provided by:  Patient   used: No    Cough  This is a new problem. The current episode started 1 to 4 weeks ago. The problem has been gradually worsening. The problem occurs every few hours. The cough is Productive of purulent sputum. Associated symptoms include a sore throat. Pertinent negatives include no chest pain, chills, ear congestion, ear pain, fever, headaches, hemoptysis, myalgias, nasal congestion, postnasal drip, rash, rhinorrhea, shortness of breath, sweats, weight loss or wheezing. The symptoms are aggravated by lying down. He has tried nothing for the symptoms. His past medical history is significant for pneumonia. There is no history of asthma, bronchiectasis, bronchitis, COPD, emphysema or environmental allergies.       Past Medical History  Allergies as of 12/09/2024    (No Known Allergies)       (Not in a hospital admission)       Past Medical History:   Diagnosis Date    CAP (community acquired pneumonia) 02/07/2023    Headache, unspecified 01/16/2019    Chronic headaches    Other conditions influencing health status     Lumbar Neuritis L5 - S1    Other intervertebral disc degeneration, lumbar region     Bulging lumbar disc    Other intervertebral disc degeneration, lumbar region     Disc degeneration, lumbar    Personal history of other diseases of male genital organs     History of undescended testicle    Radiculopathy, lumbar region     Lumbar radiculopathy       Past Surgical History:   Procedure Laterality Date    OTHER SURGICAL HISTORY  06/25/2013    Nerve Block Transforaminal Epidural    OTHER SURGICAL HISTORY  06/25/2013    Nerve Block Transforaminal Epidural Lumbar    OTHER SURGICAL HISTORY  12/17/2018    Lower back  surgery    OTHER SURGICAL HISTORY  12/17/2018    Orchiectomy        reports that he has quit smoking. His smoking use included cigarettes. He has never used smokeless tobacco. He reports current alcohol use of about 1.0 - 2.0 standard drink of alcohol per week. He reports that he does not use drugs.    Review of Systems  Review of Systems   Constitutional:  Positive for fatigue. Negative for chills, fever and weight loss.   HENT:  Positive for congestion and sore throat. Negative for ear pain, postnasal drip, rhinorrhea and sinus pressure.    Respiratory:  Positive for cough. Negative for apnea, hemoptysis, choking, chest tightness, shortness of breath and wheezing.    Cardiovascular:  Negative for chest pain and palpitations.   Gastrointestinal:  Negative for nausea and vomiting.   Musculoskeletal:  Negative for arthralgias and myalgias.   Skin:  Negative for rash.   Allergic/Immunologic: Negative for environmental allergies.   Neurological:  Negative for dizziness and headaches.   Hematological:  Negative for adenopathy.   Psychiatric/Behavioral:  Negative for confusion.                                   Objective    Vitals:    12/09/24 0755   BP: (!) 136/92   BP Location: Left arm   Patient Position: Sitting   Pulse: 81   Resp: 18   Temp: 36.9 °C (98.5 °F)   TempSrc: Oral   SpO2: 97%   Weight: 78.9 kg (174 lb)     No LMP for male patient.    Physical Exam  Vitals and nursing note reviewed.   Constitutional:       General: He is not in acute distress.     Appearance: Normal appearance. He is not ill-appearing, toxic-appearing or diaphoretic.   HENT:      Right Ear: Tympanic membrane normal.      Left Ear: Tympanic membrane normal.      Nose: No congestion or rhinorrhea.      Mouth/Throat:      Pharynx: No oropharyngeal exudate or posterior oropharyngeal erythema.   Cardiovascular:      Rate and Rhythm: Normal rate and regular rhythm.      Pulses: Normal pulses.      Heart sounds: Normal heart sounds.   Pulmonary:       Effort: Pulmonary effort is normal.      Breath sounds: Rhonchi present.   Neurological:      General: No focal deficit present.      Mental Status: He is alert and oriented to person, place, and time.   Psychiatric:         Mood and Affect: Mood normal.         Behavior: Behavior normal.         Procedures    Point of Care Test & Imaging Results from this visit  No results found for this visit on 12/09/24.   XR chest 2 views    Result Date: 12/9/2024  Interpreted By:  Rhea Stevens, STUDY: XR CHEST 2 VIEWS;  12/9/2024 8:19 am   INDICATION: Signs/Symptoms:productive cough 8 days.   COMPARISON: 03/02/2023   ACCESSION NUMBER(S): [id number]   ORDERING CLINICIAN: IZABEL DUCKWORTH   FINDINGS: The heart is normal in size.   There is no consolidation or pleural fluid.   The mediastinum is unremarkable. There are degenerative changes of the spine.   COMPARISON OF FINDING: The chest is similar.       No acute cardiopulmonary disease.   MACRO: none   Signed by: Rhea Stevens 12/9/2024 8:33 AM Dictation workstation:   [id number]     Diagnostic study results (if any) were reviewed by [facility].    Assessment/Plan   Allergies, medications, history, and pertinent labs/EKGs/Imaging reviewed by Izabel Duckworth.     Medical Decision Making  Based on history, clinical exam you have bronchitis, treatment with oral antibiotics is recommended. Follow up with PCP in 1 week. If new or worsening sx please return for evaluation or go to the ER.    Orders and Diagnoses  Diagnoses and all orders for this visit:  Acute bronchitis, unspecified organism  -     XR chest 2 views  -     azithromycin (Zithromax) 250 mg tablet; Take 2 tablets (500 mg) by mouth today, than take 1 tablet daily for 4 days.  -     guaiFENesin (Mucinex) 600 mg 12 hr tablet; Take 2 tablets (1,200 mg) by mouth 2 times a day for 5 days. Do not crush, chew, or split.  -     benzonatate (Tessalon) 100 mg capsule; Take 1 capsule (100 mg) by mouth 3 times a day as  needed for cough for up to 5 days. Do not crush or chew.      Medical Admin Record      Patient disposition: Home    Electronically signed by [facility]  10:49 PM       0.5

## 2024-12-09 NOTE — PATIENT INSTRUCTIONS
Based on history, clinical exam you have bronchitis, treatment with oral antibiotics is recommended. Follow up with PCP in 1 week. If new or worsening sx please return for evaluation or go to the ER.

## 2024-12-11 ENCOUNTER — APPOINTMENT (OUTPATIENT)
Dept: PRIMARY CARE | Facility: CLINIC | Age: 79
End: 2024-12-11
Payer: MEDICARE

## 2024-12-19 ENCOUNTER — APPOINTMENT (OUTPATIENT)
Dept: SPORTS MEDICINE | Facility: HOSPITAL | Age: 79
End: 2024-12-19
Payer: MEDICARE

## 2025-01-26 NOTE — PROGRESS NOTES
Corie Barclay MD   Adult Reconstruction and Joint Replacement Surgery  Phone: [phone]     Fax: [phone]       Name: Montana Gagnon  Age: 80 y.o.   : 1945   Date of Visit: 2025    Follow-up Knee    CC: Follow-up LEFT knee     History of Present Illness:  This patient presents with 10 years of LEFT knee pain.     They were last seen for this problem on 2024. At the last visit, the patient underwent steroid injection to the left knee.  He reports this lasted about 8 weeks with very minimal relief.  The patient was also fitted for a medial  brace. The patient reports the brace did not help.  He was hoping avoid surgery but is unable to tolerate the current level of symptoms.  He is interested in proceeding with knee replacement surgery.    Patient has tried the following Ice, Activity modification, Corticosteroid injections , medial  brace, 5 Problem 6 nd Xray. Date of last steroid injection: 2024, Dr. Fredi Escobar. Patient does not have pain at night. Patient does not report falls related to this problem. Patient is able to walk unlimited blocks. Patient is currently using nothing as assistive device. Primarily complains of medial pain. Patient has difficulty with walking , getting up from a chair, and walking on unlevel surfaces . The pain is significantly impacting their ability to perform activities of daily living. Patient reports no longer able to do activities such as walking without pain.      Focused History  PMH: Reviewed and PE/DVT: no. Severe aortic stenosis.   PSH: Reviewed , Hip/Knee replacement: no, Hip/Knee surgery: no, Anesthesia complications: no, Spine surgery: no, Surgical infection: no, and Weight loss surgery: no  Meds: Reviewed, Current Anticoagulants: no, Weight loss medication: no, and Current Opioids: no  Allergies: Reviewed  and The patient reports no contraindications or allergies to cephalosporins, aspirin, NSAIDs or opioids, except as  noted above.  FH: No family history of any bleeding or clotting disorders.  SHx: Reviewed, Occupation: self employed, Current smoker: no, EtOH intake weekly: 1-2 glasses, Social support: Wife, and Preferred physical activities: recumbent bike  Dental Hx: Last routine cleaning: Oct 2024 and Discussed that all invasive dental work must be completed at least 3 months prior to joint replacement surgery. Patient understands they are to avoid any invasive dental work 3-6 months post-surgically.   Jehovah´s Witness: no        HISTORY  PROMs   No questionnaires on file.     Past Medical History:   Diagnosis Date    CAP (community acquired pneumonia) 02/07/2023    Headache, unspecified 01/16/2019    Chronic headaches    Other conditions influencing health status     Lumbar Neuritis L5 - S1    Other intervertebral disc degeneration, lumbar region     Bulging lumbar disc    Other intervertebral disc degeneration, lumbar region     Disc degeneration, lumbar    Personal history of other diseases of male genital organs     History of undescended testicle    Radiculopathy, lumbar region     Lumbar radiculopathy       Past Medical History:   Diagnosis Date    CAP (community acquired pneumonia) 02/07/2023    Headache, unspecified 01/16/2019    Chronic headaches    Other conditions influencing health status     Lumbar Neuritis L5 - S1    Other intervertebral disc degeneration, lumbar region     Bulging lumbar disc    Other intervertebral disc degeneration, lumbar region     Disc degeneration, lumbar    Personal history of other diseases of male genital organs     History of undescended testicle    Radiculopathy, lumbar region     Lumbar radiculopathy     Documented in chart and reviewed.     Past Surgical History:   Procedure Laterality Date    OTHER SURGICAL HISTORY  06/25/2013    Nerve Block Transforaminal Epidural    OTHER SURGICAL HISTORY  06/25/2013    Nerve Block Transforaminal Epidural Lumbar    OTHER SURGICAL HISTORY   12/17/2018    Lower back surgery    OTHER SURGICAL HISTORY  12/17/2018    Orchiectomy       Allergies: He has No Known Allergies.     Medications:  Current Outpatient Medications   Medication Instructions    hydroCHLOROthiazide (HYDRODIURIL) 25 mg, oral, Daily    latanoprost (Xalatan) 0.005 % ophthalmic solution 1 drop, Nightly    multivitamin tablet 1 tablet, Daily    rosuvastatin (CRESTOR) 20 mg, oral, Daily       Family History   Problem Relation Name Age of Onset    Heart attack Mother  84     Documented in chart and reviewed.     Social History     Tobacco Use    Smoking status: Former     Types: Cigarettes    Smokeless tobacco: Never   Substance Use Topics    Alcohol use: Yes     Alcohol/week: 1.0 - 2.0 standard drink of alcohol     Types: 1 - 2 Glasses of wine per week        Review of Systems: Review of systems completed with medical assistant intake. Please refer to this note.     Physical Exam:  BMI: 26.    General: The patient is well appearing and has an appropriate affect.      Neurological Examination: SILT in SPN/DPN/Sural/Saphenous/Tibial nerves. 5/5 EHL, FHL, Tibial anterior, Gastrocnemius. Coordination grossly intact.      Cardiovascular Exam: Capillary refill <2 seconds.      Lymphatic Examination: There is no obvious lymphatic swelling present around the involved joint.     Skin Exam: Skin around the pertinent joint is without evidence of infection or rash.     Gait: The patient ambulates with an antalgic gait.      Right Hip Examination:  The skin is intact over the hip.     There is no tenderness over the greater trochanter.     Range of motion is full extension to 100 degrees of flexion.     The hip is stable without subluxation or dislocation.     The hip internally rotates to 15 degrees and externally rotates to 45 degrees.     There is no pain with hip motion.     Left Hip Examination:  The skin is intact over the hip.     There is no tenderness over the greater trochanter.     Range of  motion is full extension to 100 degrees of flexion.     The hip is stable without subluxation or dislocation.     The hip internally rotates to 15 degrees and externally rotates to 45 degrees.     There is no pain with hip motion.     Left Knee Examination:  Examination of the left knee reveals the skin to be intact.     There is a moderate effusion in the knee.     The alignment of the knee is Varus.     This deformity is partially correctable.     There is tenderness to palpation over the joint line.     There is significant quadriceps atrophy.     Range of Motion: 10 to 115 degrees of flexion.     The knee is stable to varus-valgus stress and anterior-posterior stress.      There is moderate grinding with range of motion.     There is mild patellofemoral crepitus.     Right Knee Examination:  Examination of the right knee reveals the skin to be intact.      There is no obvious swelling.     There is a no effusion in the knee.      The alignment of the knee is normal.     There is no tenderness to palpation over the joint line.     There is no significant quadriceps atrophy.     Range of motion is full extension to 120 degrees of flexion.     The knee is stable to varus-valgus stress and anterior-posterior stress.      There is no grinding with range of motion.     There is no patellofemoral crepitus.    Imaging:  Radiographs were personally reviewed today. There is evidence of severe LEFT knee osteoarthritis with MEDIAL bone on bone apposition.    Impression and Plan:  This patient is here for follow-up evaluation of LEFT knee.    DIAGNOSIS  Arthritis of left knee     I have discussed the options in detail with the patient. We have discussed anti-inflammatory medication, activity modification, physical therapy, corticosteroid injections, viscosupplementation injections, partial knee replacement surgery and total knee replacement surgery.  The patient is no longer responding to nonsurgical treatment  measures.    The risks and benefits of all these treatment options have been discussed in detail. The patient has tried at least 3 months of the above conservative treatments and continues to have disabling pain, impaired activities of daily living and worsened quality of life. The patient is a candidate for a LEFT TOTAL knee replacement. We discussed expected rehabilitation, DVT prophylaxis, time points during recovery, likely functional outcomes and long-term issues with knee replacement procedures.    We have reviewed the typical recovery after surgery and have discussed the fact that full recovery can take up to 1 year. Discussed that numbness around the incision site is common after the surgery and can last up to a year or permanently.     We have reviewed the typical recovery after surgery and have discussed the fact that full recovery can take up to 1 year or even longer.     The patient does not have any of the following contraindications to arthroplasty including active infection of the knee joint, systemic bacteremia, active skin infection or an open wound at the surgical site, neuropathic arthritis or severe, rapidly progressive neurological disease.     Patient will consider proceeding with LEFT TOTAL knee replacement. All questions were answered today. If the patient chooses to move forward with surgical scheduling, they will be enrolled in a preoperative arthroplasty teaching class and the pre-admission testing pathway. The patient was encouraged to contact their primary care physician to discuss fitness for surgery.     Social support after surgery: Spouse   Pain medication plan: Standard (Acetominophen, Meloxicam, Oxycodone, Tramadol)   Additional preoperative considerations:   [x ] Cardiac clearance -obtained 12/2/2024    Diagnosis: M17.12 - LEFT knee osteoarthritis    Procedure: 27176 - Total KNEE Arthroplasty (TKA)   Surgery Location: [place]   Equipment Requests: TKA: Tiemann thin bent Hohmann  retractors, 2 Langenbecks and MISHEL: Taylor CS (PS and TS on-hand), DARRLEL   Anesthesia: TKA: Regional - Spinal, Adductor canal block, iPACK block   Discharge: Same-day (RAPID)      _____________________  Corie Barclay MD   Attending Orthopaedic Surgeon  [facility]    [facility]    This office note was transcribed with dictation software.  Please excuse any typographical errors, program misunderstandings leading to inadvertent insertions or deletions of inappropriate wording, pronoun errors and other unintentional transcription errors not noticed on proof-reading.

## 2025-01-29 ENCOUNTER — OFFICE VISIT (OUTPATIENT)
Dept: ORTHOPEDIC SURGERY | Facility: HOSPITAL | Age: 80
End: 2025-01-29
Payer: MEDICARE

## 2025-01-29 ENCOUNTER — HOSPITAL ENCOUNTER (OUTPATIENT)
Dept: RADIOLOGY | Facility: HOSPITAL | Age: 80
Discharge: HOME | End: 2025-01-29
Payer: MEDICARE

## 2025-01-29 DIAGNOSIS — M17.12 ARTHRITIS OF LEFT KNEE: Primary | ICD-10-CM

## 2025-01-29 DIAGNOSIS — M17.12 ARTHRITIS OF LEFT KNEE: ICD-10-CM

## 2025-01-29 PROCEDURE — 1159F MED LIST DOCD IN RCRD: CPT | Performed by: STUDENT IN AN ORGANIZED HEALTH CARE EDUCATION/TRAINING PROGRAM

## 2025-01-29 PROCEDURE — 73564 X-RAY EXAM KNEE 4 OR MORE: CPT | Mod: LEFT SIDE | Performed by: RADIOLOGY

## 2025-01-29 PROCEDURE — 99214 OFFICE O/P EST MOD 30 MIN: CPT | Mod: 57 | Performed by: STUDENT IN AN ORGANIZED HEALTH CARE EDUCATION/TRAINING PROGRAM

## 2025-01-29 PROCEDURE — 73564 X-RAY EXAM KNEE 4 OR MORE: CPT | Mod: LT

## 2025-01-29 PROCEDURE — 99214 OFFICE O/P EST MOD 30 MIN: CPT | Performed by: STUDENT IN AN ORGANIZED HEALTH CARE EDUCATION/TRAINING PROGRAM

## 2025-01-29 PROCEDURE — 1123F ACP DISCUSS/DSCN MKR DOCD: CPT | Performed by: STUDENT IN AN ORGANIZED HEALTH CARE EDUCATION/TRAINING PROGRAM

## 2025-02-10 ENCOUNTER — HOSPITAL ENCOUNTER (OUTPATIENT)
Dept: RADIOLOGY | Facility: HOSPITAL | Age: 80
Discharge: HOME | End: 2025-02-10
Payer: MEDICARE

## 2025-02-10 DIAGNOSIS — M17.12 ARTHRITIS OF LEFT KNEE: ICD-10-CM

## 2025-02-10 PROCEDURE — 73700 CT LOWER EXTREMITY W/O DYE: CPT | Mod: LT

## 2025-03-02 DIAGNOSIS — I10 ESSENTIAL HYPERTENSION, BENIGN: ICD-10-CM

## 2025-03-02 DIAGNOSIS — Z00.00 ROUTINE GENERAL MEDICAL EXAMINATION AT HEALTH CARE FACILITY: ICD-10-CM

## 2025-03-03 PROBLEM — M17.12 UNILATERAL PRIMARY OSTEOARTHRITIS, LEFT KNEE: Status: ACTIVE | Noted: 2025-04-08

## 2025-03-03 RX ORDER — HYDROCHLOROTHIAZIDE 25 MG/1
25 TABLET ORAL DAILY
Qty: 90 TABLET | Refills: 3 | Status: SHIPPED | OUTPATIENT
Start: 2025-03-03

## 2025-03-04 ENCOUNTER — OFFICE VISIT (OUTPATIENT)
Dept: PRIMARY CARE | Facility: CLINIC | Age: 80
End: 2025-03-04
Payer: MEDICARE

## 2025-03-04 VITALS
HEART RATE: 79 BPM | DIASTOLIC BLOOD PRESSURE: 77 MMHG | WEIGHT: 174.5 LBS | BODY MASS INDEX: 26.45 KG/M2 | OXYGEN SATURATION: 95 % | SYSTOLIC BLOOD PRESSURE: 119 MMHG | HEIGHT: 68 IN | TEMPERATURE: 96.8 F

## 2025-03-04 DIAGNOSIS — J10.1 INFLUENZA A: Primary | ICD-10-CM

## 2025-03-04 PROCEDURE — 1123F ACP DISCUSS/DSCN MKR DOCD: CPT | Performed by: INTERNAL MEDICINE

## 2025-03-04 PROCEDURE — G2211 COMPLEX E/M VISIT ADD ON: HCPCS | Performed by: INTERNAL MEDICINE

## 2025-03-04 PROCEDURE — 3074F SYST BP LT 130 MM HG: CPT | Performed by: INTERNAL MEDICINE

## 2025-03-04 PROCEDURE — 1036F TOBACCO NON-USER: CPT | Performed by: INTERNAL MEDICINE

## 2025-03-04 PROCEDURE — 1159F MED LIST DOCD IN RCRD: CPT | Performed by: INTERNAL MEDICINE

## 2025-03-04 PROCEDURE — 3078F DIAST BP <80 MM HG: CPT | Performed by: INTERNAL MEDICINE

## 2025-03-04 PROCEDURE — 99213 OFFICE O/P EST LOW 20 MIN: CPT | Performed by: INTERNAL MEDICINE

## 2025-03-04 ASSESSMENT — ENCOUNTER SYMPTOMS
DEPRESSION: 0
LOSS OF SENSATION IN FEET: 0
OCCASIONAL FEELINGS OF UNSTEADINESS: 0

## 2025-03-04 NOTE — PROGRESS NOTES
"Subjective   Patient ID: Montana Gagnon is a 80 y.o. male who presents for Sick Visit.    6 weeks ago diagnosed with 'bronchitis' and treated with Zpak.  Probably had COVID as wife got sick shortly after.    10 days ago developed cough, green phlegm production, intermittent fevers.  Nasal congestion, improving.  No ear or facial pain.  No chills/sweats.  Some fatigue, no body aches.  No N/V/D.  Normal appetite.  No SOB.  Wife sick over the same time period (had COVID 3 weeks, better and then sick again).    Using tea and Robitussin.    Objective   Physical Exam    /77 (BP Location: Left arm, Patient Position: Sitting, BP Cuff Size: Adult)   Pulse 79   Temp 36 °C (96.8 °F) (Temporal)   Ht 1.727 m (5' 8\")   Wt 79.2 kg (174 lb 8 oz)   SpO2 95%   BMI 26.53 kg/m²    NAD, some hoarseness  No increased wob  Lungs CTAB  No lymphadenopathy  RRR, harsh early-mid RUSB systolic murmur  No edema    Assessment/Plan     Presumptively, influenza A with residual cough, otherwise improving; timing and improvement suggest not treating with antiviral therapy despite age.  Pt in agreement.  Continue supportive/symptom directed care.    Willy Irizarry MD         "

## 2025-03-17 ENCOUNTER — APPOINTMENT (OUTPATIENT)
Dept: ORTHOPEDIC SURGERY | Facility: HOSPITAL | Age: 80
End: 2025-03-17
Payer: MEDICARE

## 2025-03-24 ENCOUNTER — APPOINTMENT (OUTPATIENT)
Dept: PRIMARY CARE | Facility: CLINIC | Age: 80
End: 2025-03-24
Payer: MEDICARE

## 2025-03-24 ENCOUNTER — APPOINTMENT (OUTPATIENT)
Dept: PREADMISSION TESTING | Facility: HOSPITAL | Age: 80
End: 2025-03-24
Payer: MEDICARE

## 2025-03-25 NOTE — PROGRESS NOTES
Corie Barclay MD   Adult Reconstruction and Joint Replacement Surgery  Phone: [phone]     Fax: [phone]       Name: Montana Gagnon  Age: 80 y.o.   : 1945   Date of Visit: 3/26/2025    PREOPERATIVE CONSULTATION    CC: Left knee pain    Clinical History:  This patient presents for discussion of upcoming LEFT total KNEE arthroplasty.     The patient reports 10 years of LEFT knee pain.     The patient has tried at least 3 months of conservative treatments and continues to have disabling pain, impaired activities of daily living and worsened quality of life. They rate their pain and/or debilitation as severe at times.     Patient has tried the following Ice, Activity modification, Corticosteroid injections , medial  brace, 5 Problem 6 nd Xray. Date of last steroid injection: 2024, Dr. Fredi Escobar. Patient does not have pain at night. Patient does not report falls related to this problem. Patient is able to walk unlimited blocks. Patient is currently using nothing as assistive device. Primarily complains of medial pain. Patient has difficulty with walking , getting up from a chair, and walking on unlevel surfaces . The pain is significantly impacting their ability to perform activities of daily living. Patient reports no longer able to do activities such as walking without pain.      Focused History  PMH: Reviewed and PE/DVT: no. Severe aortic stenosis.   PSH: Reviewed , Hip/Knee replacement: no, Hip/Knee surgery: no, Anesthesia complications: no, Spine surgery: no, Surgical infection: no, and Weight loss surgery: no  Meds: Reviewed, Current Anticoagulants: no, Weight loss medication: no, and Current Opioids: no  Allergies: Reviewed  and The patient reports no contraindications or allergies to cephalosporins, aspirin, NSAIDs or opioids, except as noted above.  FH: No family history of any bleeding or clotting disorders.  SHx: Reviewed, Occupation: self employed, Current smoker: no,  EtOH intake weekly: 1-2 glasses, Social support: Wife, and Preferred physical activities: recumbent bike  Dental Hx: Last routine cleaning: Oct 2024 and Discussed that all invasive dental work must be completed at least 3 months prior to joint replacement surgery. Patient understands they are to avoid any invasive dental work 3-6 months post-surgically.   Jehovah´s Witness: no    PROMs/HISTORY  PROMs   Koos Jr-Knee Injury And Osteoarthritis Outcome Score For Joint Replacement    3/17/2025  2:28 PM EDT - Filed by Patient   Instructions    The following question concerns the amount of joint stiffness you have experienced during the last week in your knee. Stiffness is a sensation of restriction or slowness in the ease with which you move your knee joint.   How severe is your knee stiffness after first wakening in the morning? Mild   What amount of knee pain have you experienced the last week during the following activities?   Twisting/pivoting on your knee Moderate   Straightening knee fully None   Going up or down stairs None   Standing upright None   The following questions concern your physical function. By this we mean your ability to move around and to look after yourself. For each of the following activities please indicate the degree of difficulty you have experienced in the last week due to your knee.   Rising from sitting Moderate   Bending to floor/ an object Mild   Koos Jr Scoring (range: 0 - 100) 70.7     [facility] [place] 12 Item Health Survey (Vr-12)    3/17/2025  2:50 PM EDT - Filed by Patient   In general, would you say your health is: Good   The following questions are about activities you might do during a typical day. Does your health now limit you in these activities?  If so, how much?   Moderate activities, such as moving a table, pushing a vacuum , bowling, or playing golf? 3 No, Not Limited At All   Climbing several flights of stairs? No, Not Limited At All   During the past 4  weeks, have you had any of the following problems with your work or other regular daily activities as a result of your physical health?   Accomplished less than you would like. No, None Of The Time   Were limited in the kind of work or other activities. No, None Of The Time   During the past 4 weeks, have you had any of the following problems with your work or other regular daily activities as a result of any emotional problems (such as feeling depressed or anxious)?   Accomplished less than you would like No, None Of The Time   Didn't do work or other activities as carefully as usual No, None Of The Time   During the past 4 weeks, how much did pain interfere with your normal work (including both work outside the home and house work)? Not At All   How much of the time during the past 4 weeks:   Have you felt calm and peaceful? All Of The Time   Did you have a lot of energy? Most Of The Time   Have you felt downhearted and blue? None Of The Time   During the past 4 weeks, how much of the time has your physical health or emotional problems interfered with your social activities (like visiting with friends, relatives, etc.)? None Of The Time   Compared to one year ago, how would you rate your physical health in general now? About The Same   Compared to one year ago, how would you rate your emotional problems (such as feeling anxious, depressed or irritable) now? About The Same   Ort Vr-12 Question Pcs (range: 0 - 100) 52.01   Ort Vr-12 Question McS (range: 0 - 100) 61.53          Past Medical History:   Diagnosis Date    Adverse effect of anesthesia 1/10/2015    COULD NOT URINATE NEEDED CATHETER    Allergic SPRING POLLEN    CAP (community acquired pneumonia) 02/07/2023    Cataract 1/17/1990    Fractures 1/6/1996    Headache, unspecified 01/16/2019    Chronic headaches    Hearing aid worn 1/5/1990    Heart murmur 1/10/2024    Heart valve disease 4/4/2024    HL (hearing loss) 1/5/1990    Hypertension 1/18/2006    Other  conditions influencing health status     Lumbar Neuritis L5 - S1    Other intervertebral disc degeneration, lumbar region     Bulging lumbar disc    Other intervertebral disc degeneration, lumbar region     Disc degeneration, lumbar    Personal history of other diseases of male genital organs     History of undescended testicle    Radiculopathy, lumbar region     Lumbar radiculopathy    Valvular heart disease 2/5/2024    moderate aortic stenosis       Past Medical History:   Diagnosis Date    Adverse effect of anesthesia 1/10/2015    COULD NOT URINATE NEEDED CATHETER    Allergic SPRING POLLEN    CAP (community acquired pneumonia) 02/07/2023    Cataract 1/17/1990    Fractures 1/6/1996    Headache, unspecified 01/16/2019    Chronic headaches    Hearing aid worn 1/5/1990    Heart murmur 1/10/2024    Heart valve disease 4/4/2024    HL (hearing loss) 1/5/1990    Hypertension 1/18/2006    Other conditions influencing health status     Lumbar Neuritis L5 - S1    Other intervertebral disc degeneration, lumbar region     Bulging lumbar disc    Other intervertebral disc degeneration, lumbar region     Disc degeneration, lumbar    Personal history of other diseases of male genital organs     History of undescended testicle    Radiculopathy, lumbar region     Lumbar radiculopathy    Valvular heart disease 2/5/2024    moderate aortic stenosis     Documented in chart and reviewed.     Past Surgical History:   Procedure Laterality Date    CIRCUMCISION, PRIMARY  1944    ORCHIECTOMY  1/28/54    OTHER SURGICAL HISTORY  06/25/2013    Nerve Block Transforaminal Epidural    OTHER SURGICAL HISTORY  06/25/2013    Nerve Block Transforaminal Epidural Lumbar    OTHER SURGICAL HISTORY  12/17/2018    Lower back surgery    OTHER SURGICAL HISTORY  12/17/2018    Orchiectomy    SPINE SURGERY  1/10/2015    TONSILLECTOMY  1/10/52    WISDOM TOOTH EXTRACTION  1/20/77       Allergies: He has No Known Allergies.     Medications:  Current Outpatient  Medications   Medication Instructions    hydroCHLOROthiazide (HYDRODIURIL) 25 mg, oral, Daily    latanoprost (Xalatan) 0.005 % ophthalmic solution 1 drop, Nightly    multivitamin tablet 1 tablet, Daily    rosuvastatin (CRESTOR) 20 mg, oral, Daily       Family History   Problem Relation Name Age of Onset    Heart attack Mother Jerri Hanks 84    Hypertension Father FAB HANKS      Documented in chart and reviewed.     Social History     Tobacco Use    Smoking status: Former     Current packs/day: 0.00     Average packs/day: 1 pack/day for 30.0 years (30.0 ttl pk-yrs)     Types: Cigarettes     Start date: 1965     Quit date: 1995     Years since quittin.2     Passive exposure: Past    Smokeless tobacco: Never   Substance Use Topics    Alcohol use: Yes     Alcohol/week: 1.0 - 2.0 standard drink of alcohol     Types: 1 - 2 Glasses of wine per week        Review of Systems: Review of systems completed with medical assistant intake. Please refer to this note.     Physical Exam:  BMI: 26.    General: The patient is well appearing and has an appropriate affect.      Neurological Examination: SILT in SPN/DPN/Sural/Saphenous/Tibial nerves. 5/5 EHL, FHL, Tibial anterior, Gastrocnemius. Coordination grossly intact.      Cardiovascular Exam: Capillary refill <2 seconds.      Lymphatic Examination: There is no obvious lymphatic swelling present around the involved joint.     Skin Exam: Skin around the pertinent joint is without evidence of infection or rash.     Gait: The patient ambulates with an antalgic gait.      Right Hip Examination:  The skin is intact over the hip.     There is no tenderness over the greater trochanter.     Range of motion is full extension to 100 degrees of flexion.     The hip is stable without subluxation or dislocation.     The hip internally rotates to 15 degrees and externally rotates to 45 degrees.     There is no pain with hip motion.     Left Hip Examination:  The skin is intact  over the hip.     There is no tenderness over the greater trochanter.     Range of motion is full extension to 100 degrees of flexion.     The hip is stable without subluxation or dislocation.     The hip internally rotates to 15 degrees and externally rotates to 45 degrees.     There is no pain with hip motion.     Left Knee Examination:  Examination of the left knee reveals the skin to be intact.     There is a moderate effusion in the knee.     The alignment of the knee is Varus.     This deformity is partially correctable.     There is tenderness to palpation over the joint line.     There is significant quadriceps atrophy.     Range of Motion: 10 to 115 degrees of flexion.     The knee is stable to varus-valgus stress and anterior-posterior stress.      There is moderate grinding with range of motion.     There is mild patellofemoral crepitus.     Right Knee Examination:  Examination of the right knee reveals the skin to be intact.      There is no obvious swelling.     There is a no effusion in the knee.      The alignment of the knee is normal.     There is no tenderness to palpation over the joint line.     There is no significant quadriceps atrophy.     Range of motion is full extension to 120 degrees of flexion.     The knee is stable to varus-valgus stress and anterior-posterior stress.      There is no grinding with range of motion.     There is no patellofemoral crepitus.    Radiographs:  Radiographs were personally reviewed today. There is evidence of severe LEFT knee osteoarthritis with MEDIAL bone on bone apposition.    Impression:  This patient presents with severe LEFT knee osteoarthritis with bone on bone apposition. Patient has tried and failed appropriate conservative measures and now has limitation in ADL's.     Diagnosis:  Arthritis of left knee      Recommendations / Plan:    I have discussed the options in detail with the patient. We have discussed anti-inflammatory medication, activity  modification, physical therapy, corticosteroid injections, viscosupplementation injections, partial knee replacement surgery and total knee replacement surgery.    The risks and benefits of all these treatment options have been discussed in detail. The patient has tried at least 3 months of the above conservative treatments and continues to have disabling pain, impaired activities of daily living and worsened quality of life. The patient is a candidate for a LEFT TOTAL knee replacement.     We discussed the hospital stay, postoperative rehab and follow up required as well as the potential risks of surgery including bleeding, need for homologous blood transfusion, infection, need for reoperation, failure of the operation to provide symptomatic relief, need for multiple revision surgeries in the setting of bleeding, wear, infection, instability, stiffness (meaning loss of flexion and/or loss of extension) requiring closed and/or open manipulation and/or revision, damage to major neurovascular structures, venous thrombolic disease, complications of regional and/or general anesthesia, as well as death. The patient also understands that a good result is not guaranteed and that poor outcomes can at times be unavoidable. The patient may also have persistent and chronic pain that could require further intervention.  The patient confirms their understanding of the risks as well as the benefits of surgery. I have explained that although knee replacement generally provides excellent pain relief and improvement in function, some patients continue to have a feeling of stiffness in the knee that can be permanent. We discussed the importance of physical therapy postoperatively to regain knee range of motion. Postoperative pain management strategies were reviewed. We discussed that most patients discharge home in 0-1 days after their surgery depending on their medical health, physical function and social support.      Discussed  potential use of augmentation technology which could include computer navigation or robotic assistance.  This may necessitate the use of additional incisions as well as pins that could induce complications such as infection, fracture, pain, wound healing issues among other potential complications.  Additionally, higher level imaging such as a CT scan may be needed for surgical planning and use of this augmentation technology.  The patient verbalizes understanding of this and is in agreement to proceed with the potential use of this assistive technology.    Also discussed the rationale for selectively resurfacing the patella.  The patient has no anterior knee pain or significant dysfunction with stairs that localizes to the anterior knee.  If the cartilage is satisfactory in appearance at the time of surgery and the patella tracks appropriately, the decision may be made to selectively resurface the patella.  If any concern with patellofemoral compartment integrity, the decision would be made to resurface the patella.  The patient is understanding of this and agreeable with either decision    Discussed the potential use of press-fit components for total knee replacement.  The determination will be made based on the CT scan assessment as well as intraoperative findings.  Discussed that press-fit components can have complications which could include increased postsurgical pain, rate of aseptic loosening and bone loss if revision is required.  Patient demonstrates understanding of these specific risks and is agreeable to the potential consideration proximal with component usage.      We have reviewed the typical recovery after surgery and have discussed the fact that full recovery can take up to 1 year or even longer.     The patient does not have any of the following contraindications to arthroplasty including active infection of the knee joint, systemic bacteremia, active skin infection or an open wound at the surgical  site, neuropathic arthritis or severe, rapidly progressive neurological disease.     Patient will consider proceeding with LEFT TOTAL knee replacement. All questions were answered today. If the patient chooses to move forward with surgical scheduling, they will be enrolled in a preoperative arthroplasty teaching class and the pre-admission testing pathway. The patient was encouraged to contact their primary care physician to discuss fitness for surgery. The patient has sought medical clearance from: Cardiologist: 12/2/24. Pre-admission testing appointment date: 3/26/2025.    Social support after surgery: Spouse, daughter   Pain medication plan: Standard (Acetominophen, Meloxicam, Oxycodone, Tramadol)   Additional preoperative considerations:   [x ] Cardiac clearance -obtained 12/2/2024    Diagnosis: M17.12 - LEFT knee osteoarthritis    Procedure: 33696 - Total KNEE Arthroplasty (TKA)   Surgery Location: [facility]   Equipment Requests: TKA: Tiemann thin bent Hohmann retractors, 2 Langenbecks and MISHEL: Triwilberto CS (PS and TS on-hand), DARRELL   Anesthesia: TKA: Regional - Spinal, Adductor canal block, iPACK block   Discharge: Same-day (RAPID)     _____________  Corie Barclay MD   Attending Orthopaedic Surgeon  [facility]    [facility]       This office note was transcribed with dictation software.  Please excuse any typographical errors, program misunderstandings leading to inadvertent insertions or deletions of inappropriate wording, pronoun errors and other unintentional transcription errors not noticed on proof-reading.

## 2025-03-26 ENCOUNTER — OFFICE VISIT (OUTPATIENT)
Dept: ORTHOPEDIC SURGERY | Facility: HOSPITAL | Age: 80
End: 2025-03-26
Payer: MEDICARE

## 2025-03-26 ENCOUNTER — LAB (OUTPATIENT)
Dept: LAB | Facility: HOSPITAL | Age: 80
End: 2025-03-26
Payer: MEDICARE

## 2025-03-26 ENCOUNTER — HOSPITAL ENCOUNTER (OUTPATIENT)
Dept: RADIOLOGY | Facility: HOSPITAL | Age: 80
Discharge: HOME | End: 2025-03-26
Payer: MEDICARE

## 2025-03-26 ENCOUNTER — EDUCATION (OUTPATIENT)
Dept: ORTHOPEDIC SURGERY | Facility: HOSPITAL | Age: 80
End: 2025-03-26
Payer: MEDICARE

## 2025-03-26 ENCOUNTER — PRE-ADMISSION TESTING (OUTPATIENT)
Dept: PREADMISSION TESTING | Facility: HOSPITAL | Age: 80
End: 2025-03-26
Payer: MEDICARE

## 2025-03-26 VITALS
WEIGHT: 173.7 LBS | RESPIRATION RATE: 16 BRPM | DIASTOLIC BLOOD PRESSURE: 78 MMHG | HEIGHT: 69 IN | TEMPERATURE: 97 F | SYSTOLIC BLOOD PRESSURE: 125 MMHG | BODY MASS INDEX: 25.73 KG/M2 | HEART RATE: 80 BPM

## 2025-03-26 DIAGNOSIS — I35.0 AORTIC VALVE STENOSIS, ETIOLOGY OF CARDIAC VALVE DISEASE UNSPECIFIED: ICD-10-CM

## 2025-03-26 DIAGNOSIS — R79.9 ABNORMAL BLOOD CHEMISTRY: ICD-10-CM

## 2025-03-26 DIAGNOSIS — Z01.818 ENCOUNTER FOR OTHER PREPROCEDURAL EXAMINATION: Primary | ICD-10-CM

## 2025-03-26 DIAGNOSIS — M17.12 ARTHRITIS OF LEFT KNEE: Primary | ICD-10-CM

## 2025-03-26 DIAGNOSIS — Z01.818 PREOP TESTING: Primary | ICD-10-CM

## 2025-03-26 DIAGNOSIS — R06.09 DOE (DYSPNEA ON EXERTION): ICD-10-CM

## 2025-03-26 DIAGNOSIS — M17.12 ARTHRITIS OF LEFT KNEE: ICD-10-CM

## 2025-03-26 DIAGNOSIS — M17.12 UNILATERAL PRIMARY OSTEOARTHRITIS, LEFT KNEE: ICD-10-CM

## 2025-03-26 LAB
ALBUMIN SERPL BCP-MCNC: 4.2 G/DL (ref 3.4–5)
ALP SERPL-CCNC: 40 U/L (ref 33–136)
ALT SERPL W P-5'-P-CCNC: 18 U/L (ref 10–52)
ANION GAP SERPL CALC-SCNC: 10 MMOL/L (ref 10–20)
AST SERPL W P-5'-P-CCNC: 20 U/L (ref 9–39)
BASOPHILS # BLD AUTO: 0.02 X10*3/UL (ref 0–0.1)
BASOPHILS NFR BLD AUTO: 0.2 %
BILIRUB SERPL-MCNC: 0.4 MG/DL (ref 0–1.2)
BUN SERPL-MCNC: 21 MG/DL (ref 6–23)
CALCIUM SERPL-MCNC: 9.6 MG/DL (ref 8.6–10.3)
CHLORIDE SERPL-SCNC: 103 MMOL/L (ref 98–107)
CO2 SERPL-SCNC: 30 MMOL/L (ref 21–32)
CREAT SERPL-MCNC: 0.96 MG/DL (ref 0.5–1.3)
EGFRCR SERPLBLD CKD-EPI 2021: 79 ML/MIN/1.73M*2
EOSINOPHIL # BLD AUTO: 0.16 X10*3/UL (ref 0–0.4)
EOSINOPHIL NFR BLD AUTO: 1.9 %
ERYTHROCYTE [DISTWIDTH] IN BLOOD BY AUTOMATED COUNT: 12 % (ref 11.5–14.5)
EST. AVERAGE GLUCOSE BLD GHB EST-MCNC: 117 MG/DL
GLUCOSE SERPL-MCNC: 89 MG/DL (ref 74–99)
HBA1C MFR BLD: 5.7 %
HCT VFR BLD AUTO: 42.3 % (ref 41–52)
HGB BLD-MCNC: 13.4 G/DL (ref 13.5–17.5)
IMM GRANULOCYTES # BLD AUTO: 0.01 X10*3/UL (ref 0–0.5)
IMM GRANULOCYTES NFR BLD AUTO: 0.1 % (ref 0–0.9)
LYMPHOCYTES # BLD AUTO: 2.53 X10*3/UL (ref 0.8–3)
LYMPHOCYTES NFR BLD AUTO: 29.9 %
MCH RBC QN AUTO: 29.6 PG (ref 26–34)
MCHC RBC AUTO-ENTMCNC: 31.7 G/DL (ref 32–36)
MCV RBC AUTO: 94 FL (ref 80–100)
MONOCYTES # BLD AUTO: 0.88 X10*3/UL (ref 0.05–0.8)
MONOCYTES NFR BLD AUTO: 10.4 %
NEUTROPHILS # BLD AUTO: 4.85 X10*3/UL (ref 1.6–5.5)
NEUTROPHILS NFR BLD AUTO: 57.5 %
NRBC BLD-RTO: ABNORMAL /100{WBCS}
PLATELET # BLD AUTO: 218 X10*3/UL (ref 150–450)
POTASSIUM SERPL-SCNC: 3.8 MMOL/L (ref 3.5–5.3)
PROT SERPL-MCNC: 6.5 G/DL (ref 6.4–8.2)
RBC # BLD AUTO: 4.52 X10*6/UL (ref 4.5–5.9)
SODIUM SERPL-SCNC: 139 MMOL/L (ref 136–145)
WBC # BLD AUTO: 8.5 X10*3/UL (ref 4.4–11.3)

## 2025-03-26 PROCEDURE — 87081 CULTURE SCREEN ONLY: CPT | Mod: BEALAB

## 2025-03-26 PROCEDURE — 99214 OFFICE O/P EST MOD 30 MIN: CPT | Performed by: STUDENT IN AN ORGANIZED HEALTH CARE EDUCATION/TRAINING PROGRAM

## 2025-03-26 PROCEDURE — 93010 ELECTROCARDIOGRAM REPORT: CPT | Performed by: INTERNAL MEDICINE

## 2025-03-26 PROCEDURE — 77073 BONE LENGTH STUDIES: CPT

## 2025-03-26 PROCEDURE — 83880 ASSAY OF NATRIURETIC PEPTIDE: CPT

## 2025-03-26 PROCEDURE — 99204 OFFICE O/P NEW MOD 45 MIN: CPT

## 2025-03-26 PROCEDURE — 85025 COMPLETE CBC W/AUTO DIFF WBC: CPT

## 2025-03-26 PROCEDURE — 83036 HEMOGLOBIN GLYCOSYLATED A1C: CPT

## 2025-03-26 PROCEDURE — 80053 COMPREHEN METABOLIC PANEL: CPT

## 2025-03-26 PROCEDURE — 93005 ELECTROCARDIOGRAM TRACING: CPT

## 2025-03-26 PROCEDURE — 1123F ACP DISCUSS/DSCN MKR DOCD: CPT | Performed by: STUDENT IN AN ORGANIZED HEALTH CARE EDUCATION/TRAINING PROGRAM

## 2025-03-26 PROCEDURE — 99214 OFFICE O/P EST MOD 30 MIN: CPT | Mod: 57 | Performed by: STUDENT IN AN ORGANIZED HEALTH CARE EDUCATION/TRAINING PROGRAM

## 2025-03-26 RX ORDER — CHLORHEXIDINE GLUCONATE 40 MG/ML
SOLUTION TOPICAL DAILY
Qty: 470 ML | Refills: 0 | Status: SHIPPED | OUTPATIENT
Start: 2025-03-26 | End: 2025-03-31

## 2025-03-26 RX ORDER — ASPIRIN 81 MG/1
81 TABLET ORAL DAILY
COMMUNITY

## 2025-03-26 RX ORDER — CHLORHEXIDINE GLUCONATE ORAL RINSE 1.2 MG/ML
15 SOLUTION DENTAL DAILY
Qty: 30 ML | Refills: 0 | Status: SHIPPED | OUTPATIENT
Start: 2025-03-26 | End: 2025-03-28

## 2025-03-26 ASSESSMENT — DUKE ACTIVITY SCORE INDEX (DASI)
CAN YOU DO LIGHT WORK AROUND THE HOUSE LIKE DUSTING OR WASHING DISHES: YES
CAN YOU DO HEAVY WORK AROUND THE HOUSE LIKE SCRUBBING FLOORS OR LIFTING AND MOVING HEAVY FURNITURE: NO
CAN YOU PARTICIPATE IN STRENOUS SPORTS LIKE SWIMMING, SINGLES TENNIS, FOOTBALL, BASKETBALL, OR SKIING: NO
CAN YOU HAVE SEXUAL RELATIONS: YES
CAN YOU CLIMB A FLIGHT OF STAIRS OR WALK UP A HILL: YES
CAN YOU DO MODERATE WORK AROUND THE HOUSE LIKE VACUUMING, SWEEPING FLOORS OR CARRYING GROCERIES: YES
CAN YOU WALK A BLOCK OR TWO ON LEVEL GROUND: YES
CAN YOU PARTICIPATE IN MODERATE RECREATIONAL ACTIVITIES LIKE GOLF, BOWLING, DANCING, DOUBLES TENNIS OR THROWING A BASEBALL OR FOOTBALL: YES
CAN YOU DO YARD WORK LIKE RAKING LEAVES, WEEDING OR PUSHING A MOWER: NO
CAN YOU WALK INDOORS, SUCH AS AROUND YOUR HOUSE: YES
DASI METS SCORE: 6.5
TOTAL_SCORE: 30.2
CAN YOU RUN A SHORT DISTANCE: NO
CAN YOU TAKE CARE OF YOURSELF (EAT, DRESS, BATHE, OR USE TOILET): YES

## 2025-03-26 ASSESSMENT — PAIN SCALES - GENERAL: PAINLEVEL_OUTOF10: 0 - NO PAIN

## 2025-03-26 ASSESSMENT — PAIN - FUNCTIONAL ASSESSMENT: PAIN_FUNCTIONAL_ASSESSMENT: 0-10

## 2025-03-26 NOTE — H&P (VIEW-ONLY)
CPM/PAT Evaluation       Name: Montana Gagnon (Monatna Gagnon)  /Age: 1945/80 y.o.     In-Person       Chief Complaint: Unilateral primary osteoarthritis, left knee     HPI  Patient is an alert and oriented 80 year old male scheduled for a  left total knee arthroplasty on 2025 with Dr. Barclay for unilateral primary osteoarthritis, left knee. He reports left knee pain that he currently rates at a 6/10 which worsens during ambulation and activity. He is ambulatory without assistive devices with a current METS score of 6.5. PMHX includes OA, moderate AVS, CAD, HTN, spinal stenosis, and HLD. Presents to [facility] PAT today for perioperative risk stratification and optimization.     Past Medical History:   Diagnosis Date    Adverse effect of anesthesia 1/10/2015    COULD NOT URINATE NEEDED CATHETER    Allergic SPRING POLLEN    CAP (community acquired pneumonia) 2023    Cataract 1990    Fractures 1996    Headache, unspecified 2019    Chronic headaches    Hearing aid worn 1990    Heart murmur 1/10/2024    Heart valve disease 2024    HL (hearing loss) 1990    Hypertension 2006    Other conditions influencing health status     Lumbar Neuritis L5 - S1    Other intervertebral disc degeneration, lumbar region     Bulging lumbar disc    Other intervertebral disc degeneration, lumbar region     Disc degeneration, lumbar    Personal history of other diseases of male genital organs     History of undescended testicle    Radiculopathy, lumbar region     Lumbar radiculopathy    Valvular heart disease 2024    moderate aortic stenosis     Past Surgical History:   Procedure Laterality Date    CIRCUMCISION, PRIMARY  1944    ORCHIECTOMY  53    OTHER SURGICAL HISTORY  2013    Nerve Block Transforaminal Epidural    OTHER SURGICAL HISTORY  2013    Nerve Block Transforaminal Epidural Lumbar    OTHER SURGICAL HISTORY  2018    Lower back surgery    OTHER SURGICAL  HISTORY  12/17/2018    Orchiectomy    SPINE SURGERY  1/10/2015    TONSILLECTOMY  1/10/52    WISDOM TOOTH EXTRACTION  1/20/77     Patient  reports that he is not currently sexually active and has had partner(s) who are female.    Family History   Problem Relation Name Age of Onset    Heart attack Mother Jerri Hanks 84    Hypertension Father FAB HANKS      No Known Allergies    Prior to Admission medications    Medication Sig Start Date End Date Taking? Authorizing Provider   hydroCHLOROthiazide (HYDRODiuril) 25 mg tablet TAKE 1 TABLET BY MOUTH EVERY DAY 3/3/25  Yes Willy Irizarry MD   latanoprost (Xalatan) 0.005 % ophthalmic solution Administer 1 drop into both eyes once daily at bedtime. 3/5/23  Yes Historical Provider, MD   multivitamin tablet Take 1 tablet by mouth once daily.   Yes Historical Provider, MD   rosuvastatin (Crestor) 20 mg tablet Take 1 tablet (20 mg) by mouth once daily. 12/2/24 12/2/25 Yes Prem Stover MD   aspirin 81 mg EC tablet Take 1 tablet (81 mg) by mouth once daily. Once a day    Historical Provider, MD   chlorhexidine (Hibiclens) 4 % external liquid Apply topically once daily for 5 days. 3/26/25 3/31/25  Fish Cobb APRN-CNP   chlorhexidine (Peridex) 0.12 % solution Use 15 mL in the mouth or throat once daily for 2 doses. 15 ML night before surgery and 15 ML morning of surgery. Swish and spit 3/26/25 3/28/25  Fish Cobb APRN-CNP       Review of Systems   Constitutional: Negative for chills, decreased appetite, diaphoresis, fever and malaise/fatigue.   Eyes:  Negative for blurred vision and double vision.   Cardiovascular:  Negative for chest pain, claudication, cyanosis, dyspnea on exertion, irregular heartbeat, leg swelling, near-syncope and palpitations.   Respiratory:  Negative for cough, hemoptysis, shortness of breath and wheezing.    Endocrine: Negative for cold intolerance, heat intolerance, polydipsia, polyphagia and polyuria.   Gastrointestinal:  Negative for  abdominal pain, constipation, diarrhea, dysphagia, nausea and vomiting.   Genitourinary:  Negative for bladder incontinence, dysuria, hematuria, incomplete emptying, nocturia, frequency, pelvic pain and urgency.   Neurological:  Negative for headaches, light-headedness, paresthesias, sensory change and weakness.   Psychiatric/Behavioral:  Negative for altered mental status.    Musculoskeletal: Negative for myalgias. Positive for arthralgias     Vitals and nursing note reviewed.     Physical exam  Constitutional:       Appearance: Normal appearance. He is Overweight.   HENT:      Head: Normocephalic and atraumatic.      Mouth/Throat:      Mouth: Mucous membranes are moist.      Pharynx: Oropharynx is clear.   Eyes:      Extraocular Movements: Extraocular movements intact.      Conjunctiva/sclera: Conjunctivae normal.      Pupils: Pupils are equal, round, and reactive to light.   Cardiovascular:      PMI at left midclavicular line. Normal rate. Regular rhythm. Normal S1. Normal S2.       Murmurs: grade 3/6 GARETH RUSB      No gallop.  No click. No rub.       No audible carotid bruit     No lower extremity edema on exam  Pulmonary:      Effort: Pulmonary effort is normal.      Breath sounds: Normal breath sounds.   Abdominal:      General: Abdomen is flat. Bowel sounds are normal.      Palpations: Abdomen is soft and non-tender  Musculoskeletal:      Cervical back: Normal range of motion and neck supple.      Knee, left: Limited ROM  Skin:     General: Skin is warm and dry.      Capillary Refill: Capillary refill takes less than 2 seconds.   Neurological:      General: No focal deficit present.      Mental Status: He is alert and oriented to person, place, and time. Mental status is at baseline.   Psychiatric:         Mood and Affect: Mood normal.         Behavior: Behavior normal.         Thought Content: Thought content normal.         Judgment: Judgment normal.     Vitals and nursing note reviewed. Physical exam within  "normal limits.     Visit Vitals  /78   Pulse 80   Temp 36.1 °C (97 °F) (Temporal)   Resp 16   Ht 1.74 m (5' 8.5\")   Wt 78.8 kg (173 lb 11.2 oz)   BMI 26.03 kg/m²   Smoking Status Former   BSA 1.95 m²     DASI Risk Score      Flowsheet Row [facility]   Can you take care of yourself (eat, dress, bathe, or use toilet)?  2.75 filed at 03/26/2025 1408   Can you walk indoors, such as around your house? 1.75 filed at 03/26/2025 1408   Can you walk a block or two on level ground?  2.75 filed at 03/26/2025 1408   Can you climb a flight of stairs or walk up a hill? 5.5 filed at 03/26/2025 1408   Can you run a short distance? 0 filed at 03/26/2025 1408   Can you do light work around the house like dusting or washing dishes? 2.7 filed at 03/26/2025 1408   Can you do moderate work around the house like vacuuming, sweeping floors or carrying groceries? 3.5 filed at 03/26/2025 1408   Can you do heavy work around the house like scrubbing floors or lifting and moving heavy furniture?  0 filed at 03/26/2025 1408   Can you do yard work like raking leaves, weeding or pushing a mower? 0 filed at 03/26/2025 1408   Can you have sexual relations? 5.25 filed at 03/26/2025 1408   Can you participate in moderate recreational activities like golf, bowling, dancing, doubles tennis or throwing a baseball or football? 6 filed at 03/26/2025 1408   Can you participate in strenous sports like swimming, singles tennis, football, basketball, or skiing? 0 filed at 03/26/2025 1408   DASI SCORE 30.2 filed at 03/26/2025 1408   METS Score (Will be calculated only when all the questions are answered) 6.5 filed at 03/26/2025 1408          [place] DVT Assessment      Flowsheet Row Pre-Admission Testing [facility] 3/26/[facility]   DVT Score (IF A SCORE IS NOT CALCULATING, MUST SELECT A BMI TO COMPLETE) 9 filed at 03/26/2025 1409   Surgical Factors Elective major lower extremity " arthroplasty filed at 03/26/2025 1407   BMI (BMI MUST BE CHOSEN) 30 or less filed at 03/26/2025 1407          Modified Frailty Index      Flowsheet Row Pre-Admission Testing from 3/26/2025 in [facility]   Non-independent functional status (problems with dressing, bathing, personal grooming, or cooking) 0 filed at 03/26/2025 1408   History of diabetes mellitus  0 filed at 03/26/2025 1408   History of COPD 0 filed at 03/26/2025 1408   History of CHF No filed at 03/26/2025 1408   History of MI 0 filed at 03/26/2025 1408   History of Percutaneous Coronary Intervention, Cardiac Surgery, or Angina No filed at 03/26/2025 1408   Hypertension requiring the use of medication  0.0909 filed at 03/26/2025 1408   Peripheral vascular disease 0 filed at 03/26/2025 1408   Impaired sensorium (cognitive impairement or loss, clouding, or delirium) 0 filed at 03/26/2025 1408   TIA or CVA withouy residual deficit 0 filed at 03/26/2025 1408   Cerebrovascular accident with deficit 0 filed at 03/26/2025 1408   Modified Frailty Index Calculator .0909 filed at 03/26/2025 1408          [id number] Stroke Risk Points  Current as of just now        N/A 0 to 9 Points:      Last Change: N/A          The [id number] risk score (Lip NAZARIO, et al. 2009. © 2010 [employer]) quantifies the risk of stroke for a patient with atrial fibrillation. For patients without atrial fibrillation or under the age of 18 this score appears as N/A. Higher score values generally indicate higher risk of stroke.        This score is not applicable to this patient. Components are not calculated.          Revised Cardiac Risk Index      Flowsheet Row Pre-Admission Testing from 3/26/2025 in [facility]   High-Risk Surgery (Intraperitoneal, Intrathoracic,Suprainguinal vascular) 0 filed at 03/26/2025 1408   History of ischemic heart disease (History of MI, History of positive exercuse test, Current chest paint  considered due to myocardial ischemia, Use of nitrate therapy, ECG with pathological Q Waves) 0 filed at 03/26/2025 1408   History of congestive heart failure (pulmonary edemia, bilateral rales or S3 gallop, Paroxysmal nocturnal dyspnea, CXR showing pulmonary vascular redistribution) 0 filed at 03/26/2025 1408   History of cerebrovascular disease (Prior TIA or stroke) 0 filed at 03/26/2025 1408   Pre-operative insulin treatment 0 filed at 03/26/2025 1408   Pre-operative creatinine>2 mg/dl 0 filed at 03/26/2025 1408   Revised Cardiac Risk Calculator 0 filed at 03/26/2025 1408          Apfel Simplified Score    No data to display       Risk Analysis Index Results This Encounter    No data found in the last 10 encounters.       Stop Bang Score      Flowsheet Row Pre-Admission Testing from 3/26/2025 in [facility]   Do you snore loudly? 0 filed at 03/26/2025 1335   Do you often feel tired or fatigued after your sleep? 0 filed at 03/26/2025 1335   Has anyone ever observed you stop breathing in your sleep? 0 filed at 03/26/2025 1335   Do you have or are you being treated for high blood pressure? 0 filed at 03/26/2025 1335   Recent BMI (Calculated) 26 filed at 03/26/2025 1335   Is BMI greater than 35 kg/m2? 0=No filed at 03/26/2025 1335   Age older than 50 years old? 1=Yes filed at 03/26/2025 1335   Is your neck circumference greater than 17 inches (Male) or 16 inches (Female)? 0 filed at 03/26/2025 1335   Gender - Male 1=Yes filed at 03/26/2025 1335   STOP-BANG Total Score 2 filed at 03/26/2025 1335          Prodigy: High Risk  Total Score: 24              Prodigy Age Score      Prodigy Gender Score          ARISCAT Score for Postoperative Pulmonary Complications      Flowsheet Row Pre-Admission Testing from 3/26/2025 in [facility]   Age Calculated Score 16 filed at 03/26/2025 1408   Preoperative SpO2 0 filed at 03/26/2025 1408   Respiratory infection in the last month Either upper or  lower (i.e., URI, bronchitis, pneumonia), with fever and antibiotic treatment 0 filed at 03/26/2025 1408   Preoperative anemia (Hgb less than 10 g/dl) 0 filed at 03/26/2025 1408   Surgical incision  0 filed at 03/26/2025 1408   Duration of surgery  16 filed at 03/26/2025 1408   Emergency Procedure  0 filed at 03/26/2025 1408   ARISCAT Total Score  32 filed at 03/26/2025 1408          Rosie Perioperative Risk for Myocardial Infarction or Cardiac Arrest (CHRISTIN)      Flowsheet Row Pre-Admission Testing from 3/26/2025 in [facility]   Calculated Age Score 1.62 filed at 03/26/2025 1408   Functional Status  0 filed at 03/26/2025 1408   ASA Class  -1.92 filed at 03/26/2025 1408   Creatinine 0 filed at 03/26/2025 1408   Type of Procedure  0.80 filed at 03/26/2025 1408   CHRISTIN Total Score  -4.75 filed at 03/26/2025 1408   CHRISTIN % 0.86 filed at 03/26/2025 1408          Assessment & Plan:    Neuro:  No diagnosis or significant findings on chart review or clinical presentation and evaluation.     HEENT/Airway:  No diagnosis or significant findings on chart review or clinical presentation and evaluation.   STOP-BANG Score-2 points low risk for MARCIA    Mallampati::  II    TM distance::  >3 FB    Neck ROM::  Full  Dentures-denies  Crowns-reports x 3  Implants-reports x 3    Cardiovascular:  No significant findings on chart review or clinical presentation and evaluation.   History of Coronary artery disease-Managed with Aspirin. CACS completed 11/19/2024-resulted 608.06. Followed by cardiology Dr Stover. Patient is asymptomatic with good functional status. Negative stress test completed 6/7/2024.   History of Aortic stenosis-managed with monitoring/yearly Echocardiograms. Grade 3/6 GARETH present in RUSB. Moderate AVS per echo completed 12/2024. AV area was 1.13cm/2. Asymptomatic and euvolemic on exam. Will screen with BNP. BNP resulted at 45. WNL.   History of Hypertension-Managed with hydrochlorothiazide. BP in PAT was  125/78  History of Hyperlipidemia-Managed with Rosuvastatin  METS: 6.5  RCRI: 0 points, 3.9%  risk for postoperative MACE   CHRISTIN: 0.86% risk for postoperative MACE  EKG -normal sinus rhythm and nonspecific ST and T wave changes and PVCs Rate-66 No acute changes.           Transthoracic echocardiogram-Completed 5/2/2024  1. Left ventricular systolic function is low normal with a 50-55% estimated ejection fraction.  2. There is mild asymmetric left ventricular hypertrophy.  3. Moderate aortic valve stenosis.  4. There is moderate aortic valve cusp calcification.  5. Mild aortic valve regurgitation.  Nuclear stress test-Completed 6/7/2024  1. No clinical or electrocardiographic evidence for ischemia at a maximal workload.  2. Adequate level of stress achieved.     Pulmonary:  No diagnosis or significant findings on chart review or clinical presentation and evaluation.   ARISCAT: <26 points, 1.6% risk of in-hospital postoperative pulmonary complication  PRODIGY: Moderate risk for opioid induced respiratory depression  Smoking History-He quit smoking approximately 40 years ago. Previously smoked 1 PPD x 25 years  Discussed smoking cessation and deep breathing handout given    Renal/Urinary:  No diagnosis or significant findings on chart review or clinical presentation and evaluation, however, the patient is at increased risk of perioperative renal complications secondary to age>/= 56, male sex, and HTN. Preventative measures include BP monitoring, medication compliance, and hydration management.   CMP-Reviewed, stable  Creatinine-0.96  GFR-79    Endocrine:  No diagnosis or significant findings on chart review or clinical presentation and evaluation.   ROQ8Q-0.7%    Hematologic/Immunology:  No diagnosis or significant findings on chart review or clinical presentation and evaluation.  The patient is not a Jehovah’s witness and will accept blood and blood products if medically indicated.   History of previous blood  transfusions No  CBC-Reviewed, stable  Positive for Anemia-HGB 13.4. Normocytic, normochromic.   Caprini Score 9, patient at High for postoperative DVT. Pt supplied education/VTE handout  Anticoagulation use: Yes   Aspirin-Continue in the preoperative period. Patient aware and has no questions at this time.     Gastrointestinal:   No diagnosis or significant findings on chart review or clinical presentation and evaluation.   Recreational drug use: alcohol  Alcohol use social drinker    Infectious disease:   No diagnosis or significant findings on chart review or clinical presentation and evaluation.   Prescription provided for CHG body wash and dental rinse. CHG use instructions reviewed and provided to patient.  Staph screen collected-Positive for MSSA    Musculoskeletal:   No diagnosis or significant findings on chart review or clinical presentation and evaluation.   JHFRAT score-10 points. moderate risk for falls    Anesthesia:  ASA 3 - Patient with moderate systemic disease with functional limitations  Anticipated anesthesia-general  History of General anesthesia- yes  Complications- Post operative urinary retention in past  No family history of anesthesia complications    Labs & Imaging ordered:  CBC, CMP, HBA1C, MRSA, EKG, BNP  Nickel/metal allergy-negative  Shellfish allergy-negative    Overall, patient Moderate Risk for the scheduled Moderate Risk surgery. Discussed with patient medication instructions, NPO guidelines, and any questions or concerns.     Face to face time spent 45 minutes    Cardiac clearance for elective LTKA provided by Dr Stover cardiology. Intermediate cardiac risk. Patient is optimized. See scanned clearance in media.

## 2025-03-26 NOTE — LETTER
2025     Willy Irizarry MD  [id number] [facility] [address]  [facility] [address]    Patient: Montana Gagnon   YOB: 1945   Date of Visit: 3/26/2025       Dear Dr. Willy Irizarry MD:    Thank you for referring Montana Gagnon to me for evaluation. Below are my notes for this consultation.  If you have questions, please do not hesitate to call me. I look forward to following your patient along with you.       Sincerely,     Corie Barclay MD      CC: No Recipients  ______________________________________________________________________________________     Corie Barclay MD   Adult Reconstruction and Joint Replacement Surgery  Phone: [phone]     Fax: [phone]       Name: Montana Gagnon  Age: 80 y.o.   : 1945   Date of Visit: 3/26/2025    PREOPERATIVE CONSULTATION    CC: Left knee pain    Clinical History:  This patient presents for discussion of upcoming LEFT total KNEE arthroplasty.     The patient reports 10 years of LEFT knee pain.     The patient has tried at least 3 months of conservative treatments and continues to have disabling pain, impaired activities of daily living and worsened quality of life. They rate their pain and/or debilitation as severe at times.     Patient has tried the following Ice, Activity modification, Corticosteroid injections , medial  brace, 5 Problem 6 nd Xray. Date of last steroid injection: 2024, Dr. Fredi Escobar. Patient does not have pain at night. Patient does not report falls related to this problem. Patient is able to walk unlimited blocks. Patient is currently using nothing as assistive device. Primarily complains of medial pain. Patient has difficulty with walking , getting up from a chair, and walking on unlevel surfaces . The pain is significantly impacting their ability to perform activities of daily living. Patient reports no longer able to do activities such as walking without pain.      Focused History  PMH: Reviewed  and PE/DVT: no. Severe aortic stenosis.   PSH: Reviewed , Hip/Knee replacement: no, Hip/Knee surgery: no, Anesthesia complications: no, Spine surgery: no, Surgical infection: no, and Weight loss surgery: no  Meds: Reviewed, Current Anticoagulants: no, Weight loss medication: no, and Current Opioids: no  Allergies: Reviewed  and The patient reports no contraindications or allergies to cephalosporins, aspirin, NSAIDs or opioids, except as noted above.  FH: No family history of any bleeding or clotting disorders.  SHx: Reviewed, Occupation: self employed, Current smoker: no, EtOH intake weekly: 1-2 glasses, Social support: Wife, and Preferred physical activities: recumbent bike  Dental Hx: Last routine cleaning: Oct 2024 and Discussed that all invasive dental work must be completed at least 3 months prior to joint replacement surgery. Patient understands they are to avoid any invasive dental work 3-6 months post-surgically.   Jehovah´s Witness: no    PROMs/HISTORY  PROMs   Koos Jr-Knee Injury And Osteoarthritis Outcome Score For Joint Replacement    3/17/2025  2:28 PM EDT - Filed by Patient   Instructions    The following question concerns the amount of joint stiffness you have experienced during the last week in your knee. Stiffness is a sensation of restriction or slowness in the ease with which you move your knee joint.   How severe is your knee stiffness after first wakening in the morning? Mild   What amount of knee pain have you experienced the last week during the following activities?   Twisting/pivoting on your knee Moderate   Straightening knee fully None   Going up or down stairs None   Standing upright None   The following questions concern your physical function. By this we mean your ability to move around and to look after yourself. For each of the following activities please indicate the degree of difficulty you have experienced in the last week due to your knee.   Rising from sitting Moderate   Bending  to floor/ an object Mild   Koos Jr Scoring (range: 0 - 100) 70.7     [facility] [place] 12 Item Health Survey (Vr-12)    3/17/2025  2:50 PM EDT - Filed by Patient   In general, would you say your health is: Good   The following questions are about activities you might do during a typical day. Does your health now limit you in these activities?  If so, how much?   Moderate activities, such as moving a table, pushing a vacuum , bowling, or playing golf? 3 No, Not Limited At All   Climbing several flights of stairs? No, Not Limited At All   During the past 4 weeks, have you had any of the following problems with your work or other regular daily activities as a result of your physical health?   Accomplished less than you would like. No, None Of The Time   Were limited in the kind of work or other activities. No, None Of The Time   During the past 4 weeks, have you had any of the following problems with your work or other regular daily activities as a result of any emotional problems (such as feeling depressed or anxious)?   Accomplished less than you would like No, None Of The Time   Didn't do work or other activities as carefully as usual No, None Of The Time   During the past 4 weeks, how much did pain interfere with your normal work (including both work outside the home and house work)? Not At All   How much of the time during the past 4 weeks:   Have you felt calm and peaceful? All Of The Time   Did you have a lot of energy? Most Of The Time   Have you felt downhearted and blue? None Of The Time   During the past 4 weeks, how much of the time has your physical health or emotional problems interfered with your social activities (like visiting with friends, relatives, etc.)? None Of The Time   Compared to one year ago, how would you rate your physical health in general now? About The Same   Compared to one year ago, how would you rate your emotional problems (such as feeling anxious, depressed or  irritable) now? About The Same   Ort Vr-12 Question Pcs (range: 0 - 100) 52.01   Ort Vr-12 Question McS (range: 0 - 100) 61.53          Past Medical History:   Diagnosis Date   • Adverse effect of anesthesia 1/10/2015    COULD NOT URINATE NEEDED CATHETER   • Allergic SPRING POLLEN   • CAP (community acquired pneumonia) 02/07/2023   • Cataract 1/17/1990   • Fractures 1/6/1996   • Headache, unspecified 01/16/2019    Chronic headaches   • Hearing aid worn 1/5/1990   • Heart murmur 1/10/2024   • Heart valve disease 4/4/2024   • HL (hearing loss) 1/5/1990   • Hypertension 1/18/2006   • Other conditions influencing health status     Lumbar Neuritis L5 - S1   • Other intervertebral disc degeneration, lumbar region     Bulging lumbar disc   • Other intervertebral disc degeneration, lumbar region     Disc degeneration, lumbar   • Personal history of other diseases of male genital organs     History of undescended testicle   • Radiculopathy, lumbar region     Lumbar radiculopathy   • Valvular heart disease 2/5/2024    moderate aortic stenosis       Past Medical History:   Diagnosis Date   • Adverse effect of anesthesia 1/10/2015    COULD NOT URINATE NEEDED CATHETER   • Allergic SPRING POLLEN   • CAP (community acquired pneumonia) 02/07/2023   • Cataract 1/17/1990   • Fractures 1/6/1996   • Headache, unspecified 01/16/2019    Chronic headaches   • Hearing aid worn 1/5/1990   • Heart murmur 1/10/2024   • Heart valve disease 4/4/2024   • HL (hearing loss) 1/5/1990   • Hypertension 1/18/2006   • Other conditions influencing health status     Lumbar Neuritis L5 - S1   • Other intervertebral disc degeneration, lumbar region     Bulging lumbar disc   • Other intervertebral disc degeneration, lumbar region     Disc degeneration, lumbar   • Personal history of other diseases of male genital organs     History of undescended testicle   • Radiculopathy, lumbar region     Lumbar radiculopathy   • Valvular heart disease 2/5/2024     moderate aortic stenosis     Documented in chart and reviewed.     Past Surgical History:   Procedure Laterality Date   • CIRCUMCISION, PRIMARY  1945   • ORCHIECTOMY  53   • OTHER SURGICAL HISTORY  2013    Nerve Block Transforaminal Epidural   • OTHER SURGICAL HISTORY  2013    Nerve Block Transforaminal Epidural Lumbar   • OTHER SURGICAL HISTORY  2018    Lower back surgery   • OTHER SURGICAL HISTORY  2018    Orchiectomy   • SPINE SURGERY  1/10/2015   • TONSILLECTOMY  1/10/52   • WISDOM TOOTH EXTRACTION  76       Allergies: He has No Known Allergies.     Medications:  Current Outpatient Medications   Medication Instructions   • hydroCHLOROthiazide (HYDRODIURIL) 25 mg, oral, Daily   • latanoprost (Xalatan) 0.005 % ophthalmic solution 1 drop, Nightly   • multivitamin tablet 1 tablet, Daily   • rosuvastatin (CRESTOR) 20 mg, oral, Daily       Family History   Problem Relation Name Age of Onset   • Heart attack Mother Jerri Hanks 84   • Hypertension Father FAB HANKS      Documented in chart and reviewed.     Social History     Tobacco Use   • Smoking status: Former     Current packs/day: 0.00     Average packs/day: 1 pack/day for 30.0 years (30.0 ttl pk-yrs)     Types: Cigarettes     Start date: 1965     Quit date: 1995     Years since quittin.2     Passive exposure: Past   • Smokeless tobacco: Never   Substance Use Topics   • Alcohol use: Yes     Alcohol/week: 1.0 - 2.0 standard drink of alcohol     Types: 1 - 2 Glasses of wine per week        Review of Systems: Review of systems completed with medical assistant intake. Please refer to this note.     Physical Exam:  BMI: 26.    General: The patient is well appearing and has an appropriate affect.      Neurological Examination: SILT in SPN/DPN/Sural/Saphenous/Tibial nerves. 5/5 EHL, FHL, Tibial anterior, Gastrocnemius. Coordination grossly intact.      Cardiovascular Exam: Capillary refill <2 seconds.      Lymphatic  Examination: There is no obvious lymphatic swelling present around the involved joint.     Skin Exam: Skin around the pertinent joint is without evidence of infection or rash.     Gait: The patient ambulates with an antalgic gait.      Right Hip Examination:  The skin is intact over the hip.     There is no tenderness over the greater trochanter.     Range of motion is full extension to 100 degrees of flexion.     The hip is stable without subluxation or dislocation.     The hip internally rotates to 15 degrees and externally rotates to 45 degrees.     There is no pain with hip motion.     Left Hip Examination:  The skin is intact over the hip.     There is no tenderness over the greater trochanter.     Range of motion is full extension to 100 degrees of flexion.     The hip is stable without subluxation or dislocation.     The hip internally rotates to 15 degrees and externally rotates to 45 degrees.     There is no pain with hip motion.     Left Knee Examination:  Examination of the left knee reveals the skin to be intact.     There is a moderate effusion in the knee.     The alignment of the knee is Varus.     This deformity is partially correctable.     There is tenderness to palpation over the joint line.     There is significant quadriceps atrophy.     Range of Motion: 10 to 115 degrees of flexion.     The knee is stable to varus-valgus stress and anterior-posterior stress.      There is moderate grinding with range of motion.     There is mild patellofemoral crepitus.     Right Knee Examination:  Examination of the right knee reveals the skin to be intact.      There is no obvious swelling.     There is a no effusion in the knee.      The alignment of the knee is normal.     There is no tenderness to palpation over the joint line.     There is no significant quadriceps atrophy.     Range of motion is full extension to 120 degrees of flexion.     The knee is stable to varus-valgus stress and anterior-posterior  stress.      There is no grinding with range of motion.     There is no patellofemoral crepitus.    Radiographs:  Radiographs were personally reviewed today. There is evidence of severe LEFT knee osteoarthritis with MEDIAL bone on bone apposition.    Impression:  This patient presents with severe LEFT knee osteoarthritis with bone on bone apposition. Patient has tried and failed appropriate conservative measures and now has limitation in ADL's.     Diagnosis:  Arthritis of left knee      Recommendations / Plan:    I have discussed the options in detail with the patient. We have discussed anti-inflammatory medication, activity modification, physical therapy, corticosteroid injections, viscosupplementation injections, partial knee replacement surgery and total knee replacement surgery.    The risks and benefits of all these treatment options have been discussed in detail. The patient has tried at least 3 months of the above conservative treatments and continues to have disabling pain, impaired activities of daily living and worsened quality of life. The patient is a candidate for a LEFT TOTAL knee replacement.     We discussed the hospital stay, postoperative rehab and follow up required as well as the potential risks of surgery including bleeding, need for homologous blood transfusion, infection, need for reoperation, failure of the operation to provide symptomatic relief, need for multiple revision surgeries in the setting of bleeding, wear, infection, instability, stiffness (meaning loss of flexion and/or loss of extension) requiring closed and/or open manipulation and/or revision, damage to major neurovascular structures, venous thrombolic disease, complications of regional and/or general anesthesia, as well as death. The patient also understands that a good result is not guaranteed and that poor outcomes can at times be unavoidable. The patient may also have persistent and chronic pain that could require further  intervention.  The patient confirms their understanding of the risks as well as the benefits of surgery. I have explained that although knee replacement generally provides excellent pain relief and improvement in function, some patients continue to have a feeling of stiffness in the knee that can be permanent. We discussed the importance of physical therapy postoperatively to regain knee range of motion. Postoperative pain management strategies were reviewed. We discussed that most patients discharge home in 0-1 days after their surgery depending on their medical health, physical function and social support.      Discussed potential use of augmentation technology which could include computer navigation or robotic assistance.  This may necessitate the use of additional incisions as well as pins that could induce complications such as infection, fracture, pain, wound healing issues among other potential complications.  Additionally, higher level imaging such as a CT scan may be needed for surgical planning and use of this augmentation technology.  The patient verbalizes understanding of this and is in agreement to proceed with the potential use of this assistive technology.    Also discussed the rationale for selectively resurfacing the patella.  The patient has no anterior knee pain or significant dysfunction with stairs that localizes to the anterior knee.  If the cartilage is satisfactory in appearance at the time of surgery and the patella tracks appropriately, the decision may be made to selectively resurface the patella.  If any concern with patellofemoral compartment integrity, the decision would be made to resurface the patella.  The patient is understanding of this and agreeable with either decision    Discussed the potential use of press-fit components for total knee replacement.  The determination will be made based on the CT scan assessment as well as intraoperative findings.  Discussed that press-fit  components can have complications which could include increased postsurgical pain, rate of aseptic loosening and bone loss if revision is required.  Patient demonstrates understanding of these specific risks and is agreeable to the potential consideration proximal with component usage.      We have reviewed the typical recovery after surgery and have discussed the fact that full recovery can take up to 1 year or even longer.     The patient does not have any of the following contraindications to arthroplasty including active infection of the knee joint, systemic bacteremia, active skin infection or an open wound at the surgical site, neuropathic arthritis or severe, rapidly progressive neurological disease.     Patient will consider proceeding with LEFT TOTAL knee replacement. All questions were answered today. If the patient chooses to move forward with surgical scheduling, they will be enrolled in a preoperative arthroplasty teaching class and the pre-admission testing pathway. The patient was encouraged to contact their primary care physician to discuss fitness for surgery. The patient has sought medical clearance from: Cardiologist: 12/2/24. Pre-admission testing appointment date: 3/26/2025.    Social support after surgery: Spouse, daughter   Pain medication plan: Standard (Acetominophen, Meloxicam, Oxycodone, Tramadol)   Additional preoperative considerations:   [x ] Cardiac clearance -obtained 12/2/2024    Diagnosis: M17.12 - LEFT knee osteoarthritis    Procedure: 39025 - Total KNEE Arthroplasty (TKA)   Surgery Location: [facility]   Equipment Requests: TKA: Tiemann thin bent Hohmann retractors, 2 Langenbecks and MISHEL: Taylor MOY (PS and TS on-hand), DARRELL   Anesthesia: TKA: Regional - Spinal, Adductor canal block, iPACK block   Discharge: Same-day (RAPID)     _____________  Corie Barclay MD   Attending Orthopaedic Surgeon  [facility]    [facility]  [place]       This office note was transcribed with dictation software.  Please excuse any typographical errors, program misunderstandings leading to inadvertent insertions or deletions of inappropriate wording, pronoun errors and other unintentional transcription errors not noticed on proof-reading.

## 2025-03-26 NOTE — CPM/PAT H&P
CPM/PAT Evaluation       Name: Montana Hanks (Montana Hanks)  /Age: 1945/80 y.o.     In-Person       Chief Complaint: Unilateral primary osteoarthritis, left knee     HPI    Past Medical History:   Diagnosis Date    Adverse effect of anesthesia 1/10/2015    COULD NOT URINATE NEEDED CATHETER    Allergic SPRING POLLEN    CAP (community acquired pneumonia) 2023    Cataract 1990    Fractures 1996    Headache, unspecified 2019    Chronic headaches    Hearing aid worn 1990    Heart murmur 1/10/2024    Heart valve disease 2024    HL (hearing loss) 1990    Hypertension 2006    Other conditions influencing health status     Lumbar Neuritis L5 - S1    Other intervertebral disc degeneration, lumbar region     Bulging lumbar disc    Other intervertebral disc degeneration, lumbar region     Disc degeneration, lumbar    Personal history of other diseases of male genital organs     History of undescended testicle    Radiculopathy, lumbar region     Lumbar radiculopathy    Valvular heart disease 2024    moderate aortic stenosis     Past Surgical History:   Procedure Laterality Date    CIRCUMCISION, PRIMARY  1944    ORCHIECTOMY  53    OTHER SURGICAL HISTORY  2013    Nerve Block Transforaminal Epidural    OTHER SURGICAL HISTORY  2013    Nerve Block Transforaminal Epidural Lumbar    OTHER SURGICAL HISTORY  2018    Lower back surgery    OTHER SURGICAL HISTORY  2018    Orchiectomy    SPINE SURGERY  1/10/2015    TONSILLECTOMY  1/10/52    WISDOM TOOTH EXTRACTION  76     Patient  reports that he is not currently sexually active and has had partner(s) who are female.    Family History   Problem Relation Name Age of Onset    Heart attack Mother Jerri Spott 84    Hypertension Father FAB HANKS      No Known Allergies    Prior to Admission medications    Medication Sig Start Date End Date Taking? Authorizing Provider   hydroCHLOROthiazide (HYDRODiuril)  25 mg tablet TAKE 1 TABLET BY MOUTH EVERY DAY 3/3/25  Yes Willy Irizarry MD   latanoprost (Xalatan) 0.005 % ophthalmic solution Administer 1 drop into both eyes once daily at bedtime. 3/5/23  Yes Historical Provider, MD   multivitamin tablet Take 1 tablet by mouth once daily.   Yes Historical Provider, MD   rosuvastatin (Crestor) 20 mg tablet Take 1 tablet (20 mg) by mouth once daily. 12/2/24 12/2/25 Yes Prem Stover MD   aspirin 81 mg EC tablet Take 1 tablet (81 mg) by mouth once daily. Once a day    Historical Provider, MD   chlorhexidine (Hibiclens) 4 % external liquid Apply topically once daily for 5 days. 3/26/25 3/31/25  HARDIK Pearson   chlorhexidine (Peridex) 0.12 % solution Use 15 mL in the mouth or throat once daily for 2 doses. 15 ML night before surgery and 15 ML morning of surgery. Swish and spit 3/26/25 3/28/25  HARDIK Pearson      Patient is an alert and oriented 80 year old male scheduled for a  left total knee arthroplasty on 4/8/2025 with Dr. Barclay for unilateral primary osteoarthritis, left knee. PMHX includes OA, moderate AVS, CAD, HTN, HLD. Presents to [facility] PAT today for perioperative risk stratification and optimization.      Review of Systems   Constitutional: Negative for chills, decreased appetite, diaphoresis, fever and malaise/fatigue.   Eyes:  Negative for blurred vision and double vision.   Cardiovascular:  Negative for chest pain, claudication, cyanosis, dyspnea on exertion, irregular heartbeat, leg swelling, near-syncope and palpitations.   Respiratory:  Negative for cough, hemoptysis, shortness of breath and wheezing.    Endocrine: Negative for cold intolerance, heat intolerance, polydipsia, polyphagia and polyuria.   Gastrointestinal:  Negative for abdominal pain, constipation, diarrhea, dysphagia, nausea and vomiting.   Genitourinary:  Negative for bladder incontinence, dysuria, hematuria, incomplete emptying, nocturia, frequency, pelvic pain and  "urgency.   Neurological:  Negative for headaches, light-headedness, paresthesias, sensory change and weakness.   Psychiatric/Behavioral:  Negative for altered mental status.    Musculoskeletal: Negative for myalgias. Positive for arthralgias     Vitals and nursing note reviewed.     Physical exam  Constitutional:       Appearance: Normal appearance. He is Overweight.   HENT:      Head: Normocephalic and atraumatic.      Mouth/Throat:      Mouth: Mucous membranes are moist.      Pharynx: Oropharynx is clear.   Eyes:      Extraocular Movements: Extraocular movements intact.      Conjunctiva/sclera: Conjunctivae normal.      Pupils: Pupils are equal, round, and reactive to light.   Cardiovascular:      PMI at left midclavicular line. Normal rate. Regular rhythm. Normal S1. Normal S2.       Murmurs: grade 3/6 GARETH RUSB      No gallop.  No click. No rub.       No audible carotid bruit     No lower extremity edema on exam  Pulmonary:      Effort: Pulmonary effort is normal.      Breath sounds: Normal breath sounds.   Abdominal:      General: Abdomen is flat. Bowel sounds are normal.      Palpations: Abdomen is soft and non-tender  Musculoskeletal:      Cervical back: Normal range of motion and neck supple.      Knee, left: Limited ROM  Skin:     General: Skin is warm and dry.      Capillary Refill: Capillary refill takes less than 2 seconds.   Neurological:      General: No focal deficit present.      Mental Status: He is alert and oriented to person, place, and time. Mental status is at baseline.   Psychiatric:         Mood and Affect: Mood normal.         Behavior: Behavior normal.         Thought Content: Thought content normal.         Judgment: Judgment normal.     Vitals and nursing note reviewed. Physical exam within normal limits.     Visit Vitals  /78   Pulse 80   Temp 36.1 °C (97 °F) (Temporal)   Resp 16   Ht 1.74 m (5' 8.5\")   Wt 78.8 kg (173 lb 11.2 oz)   BMI 26.03 kg/m²   Smoking Status Former   BSA 1.95 " m²     DASI Risk Score      Flowsheet Row Pre-Admission Testing from 3/26/2025 in [facility]   Can you take care of yourself (eat, dress, bathe, or use toilet)?  2.75 filed at 03/26/2025 1408   Can you walk indoors, such as around your house? 1.75 filed at 03/26/2025 1408   Can you walk a block or two on level ground?  2.75 filed at 03/26/2025 1408   Can you climb a flight of stairs or walk up a hill? 5.5 filed at 03/26/2025 1408   Can you run a short distance? 0 filed at 03/26/2025 1408   Can you do light work around the house like dusting or washing dishes? 2.7 filed at 03/26/2025 1408   Can you do moderate work around the house like vacuuming, sweeping floors or carrying groceries? 3.5 filed at 03/26/2025 1408   Can you do heavy work around the house like scrubbing floors or lifting and moving heavy furniture?  0 filed at 03/26/2025 1408   Can you do yard work like raking leaves, weeding or pushing a mower? 0 filed at 03/26/2025 1408   Can you have sexual relations? 5.25 filed at 03/26/2025 1408   Can you participate in moderate recreational activities like golf, bowling, dancing, doubles tennis or throwing a baseball or football? 6 filed at 03/26/2025 1408   Can you participate in strenous sports like swimming, singles tennis, football, basketball, or skiing? 0 filed at 03/26/2025 1408   DASI SCORE 30.2 filed at 03/26/2025 1408   METS Score (Will be calculated only when all the questions are answered) 6.5 filed at 03/26/2025 1408          Caprini DVT Assessment      Flowsheet Row Pre-Admission Testing from 3/26/2025 in [facility]   DVT Score (IF A SCORE IS NOT CALCULATING, MUST SELECT A BMI TO COMPLETE) 9 filed at 03/26/2025 1407   Surgical Factors Elective major lower extremity arthroplasty filed at 03/26/2025 1407   BMI (BMI MUST BE CHOSEN) 30 or less filed at 03/26/2025 1407          Modified Frailty Index      Flowsheet Row Pre-Admission Testing from 3/26/2025 in   [facility]   Non-independent functional status (problems with dressing, bathing, personal grooming, or cooking) 0 filed at 03/26/2025 1408   History of diabetes mellitus  0 filed at 03/26/2025 1408   History of COPD 0 filed at 03/26/2025 1408   History of CHF No filed at 03/26/2025 1408   History of MI 0 filed at 03/26/2025 1408   History of Percutaneous Coronary Intervention, Cardiac Surgery, or Angina No filed at 03/26/2025 1408   Hypertension requiring the use of medication  0.0909 filed at 03/26/2025 1408   Peripheral vascular disease 0 filed at 03/26/2025 1408   Impaired sensorium (cognitive impairement or loss, clouding, or delirium) 0 filed at 03/26/2025 1408   TIA or CVA withouy residual deficit 0 filed at 03/26/2025 1408   Cerebrovascular accident with deficit 0 filed at 03/26/2025 1408   Modified Frailty Index Calculator .0909 filed at 03/26/2025 1408          [id number] Stroke Risk Points  Current as of 26 minutes ago        N/A 0 to 9 Points:      Last Change: N/A          The [id number] risk score (Lip NAZARIO, et al. 2009. © 2010 [employer]) quantifies the risk of stroke for a patient with atrial fibrillation. For patients without atrial fibrillation or under the age of 18 this score appears as N/A. Higher score values generally indicate higher risk of stroke.        This score is not applicable to this patient. Components are not calculated.          Revised Cardiac Risk Index      Flowsheet Row Pre-Admission Testing from 3/26/2025 in [facility]   High-Risk Surgery (Intraperitoneal, Intrathoracic,Suprainguinal vascular) 0 filed at 03/26/2025 1408   History of ischemic heart disease (History of MI, History of positive exercuse test, Current chest paint considered due to myocardial ischemia, Use of nitrate therapy, ECG with pathological Q Waves) 0 filed at 03/26/2025 1408   History of congestive heart failure (pulmonary edemia, bilateral rales or  S3 gallop, Paroxysmal nocturnal dyspnea, CXR showing pulmonary vascular redistribution) 0 filed at 03/26/2025 1408   History of cerebrovascular disease (Prior TIA or stroke) 0 filed at 03/26/2025 1408   Pre-operative insulin treatment 0 filed at 03/26/2025 1408   Pre-operative creatinine>2 mg/dl 0 filed at 03/26/2025 1408   Revised Cardiac Risk Calculator 0 filed at 03/26/2025 1408          Apfel Simplified Score    No data to display       Risk Analysis Index Results This Encounter    No data found in the last 10 encounters.       Stop Bang Score      Flowsheet Row Pre-Admission Testing from 3/26/2025 in [facility]   Do you snore loudly? 0 filed at 03/26/2025 1335   Do you often feel tired or fatigued after your sleep? 0 filed at 03/26/2025 1335   Has anyone ever observed you stop breathing in your sleep? 0 filed at 03/26/2025 1335   Do you have or are you being treated for high blood pressure? 0 filed at 03/26/2025 1335   Recent BMI (Calculated) 26 filed at 03/26/2025 1335   Is BMI greater than 35 kg/m2? 0=No filed at 03/26/2025 1335   Age older than 50 years old? 1=Yes filed at 03/26/2025 1335   Is your neck circumference greater than 17 inches (Male) or 16 inches (Female)? 0 filed at 03/26/2025 1335   Gender - Male 1=Yes filed at 03/26/2025 1335   STOP-BANG Total Score 2 filed at 03/26/2025 1335          Prodigy: High Risk  Total Score: 24              Prodigy Age Score      Prodigy Gender Score          ARISCAT Score for Postoperative Pulmonary Complications      Flowsheet Row Pre-Admission Testing from 3/26/2025 in [facility]   Age Calculated Score 16 filed at 03/26/2025 1408   Preoperative SpO2 0 filed at 03/26/2025 1408   Respiratory infection in the last month Either upper or lower (i.e., URI, bronchitis, pneumonia), with fever and antibiotic treatment 0 filed at 03/26/2025 1408   Preoperative anemia (Hgb less than 10 g/dl) 0 filed at 03/26/2025 1408   Surgical incision  0  filed at 03/26/2025 1408   Duration of surgery  16 filed at 03/26/2025 1408   Emergency Procedure  0 filed at 03/26/2025 1408   ARISCAT Total Score  32 filed at 03/26/2025 1408          Rosie Perioperative Risk for Myocardial Infarction or Cardiac Arrest (CHRISTIN)      Flowsheet Row Pre-Admission Testing from 3/26/2025 in [facility]   Calculated Age Score 1.62 filed at 03/26/2025 1408   Functional Status  0 filed at 03/26/2025 1408   ASA Class  -1.92 filed at 03/26/2025 1408   Creatinine 0 filed at 03/26/2025 1408   Type of Procedure  0.80 filed at 03/26/2025 1408   CHRISTIN Total Score  -4.75 filed at 03/26/2025 1408   CHRISTIN % 0.86 filed at 03/26/2025 1408          Assessment & Plan:    Neuro:  No diagnosis or significant findings on chart review or clinical presentation and evaluation.     HEENT/Airway:  No diagnosis or significant findings on chart review or clinical presentation and evaluation.   STOP-BANG Score-2 points low risk for MARCIA    Mallampati::  II    TM distance::  >3 FB    Neck ROM::  Full  Dentures-denies  Crowns-reports x 3  Implants-reports x 3    Cardiovascular:  No significant findings on chart review or clinical presentation and evaluation.   History of Coronary artery disease-Managed with Aspirin. CACS completed 11/19/2024-resulted 608.06. Followed by cardiology Dr Stover. Patient is asymptomatic with good functional status. Negative stress test completed 6/7/2024.   History of Aortic stenosis-managed with monitoring/yearly Echocardiograms. Grade 3/6 GARETH present in RUSB. Moderate AVS per echo completed 12/2024. AV area was 1.13cm/2. Asymptomatic and euvolemic on exam. Will screen with BNP.   History of Hypertension-Managed with hydrochlorothiazide. BP in PAT was 125/78  History of Hyperlipidemia-Managed with Rosuvastatin  METS: 6.5  RCRI: 0 points, 3.9%  risk for postoperative MACE   CHRISTIN: 0.86% risk for postoperative MACE  EKG -normal sinus rhythm and nonspecific ST and T wave  changes and PVCs Rate-66 No acute changes.   Transthoracic echocardiogram-Completed 5/2/2024  1. Left ventricular systolic function is low normal with a 50-55% estimated ejection fraction.  2. There is mild asymmetric left ventricular hypertrophy.  3. Moderate aortic valve stenosis.  4. There is moderate aortic valve cusp calcification.  5. Mild aortic valve regurgitation.  Nuclear stress test-Completed 6/7/2024  1. No clinical or electrocardiographic evidence for ischemia at a maximal workload.  2. Adequate level of stress achieved.     Pulmonary:  No diagnosis or significant findings on chart review or clinical presentation and evaluation.   ARISCAT: <26 points, 1.6% risk of in-hospital postoperative pulmonary complication  PRODIGY: Moderate risk for opioid induced respiratory depression  Smoking History-He quit smoking approximately 40 years ago. Previously smoked 1 PPD x 25 years  Discussed smoking cessation and deep breathing handout given    Renal/Urinary:  No diagnosis or significant findings on chart review or clinical presentation and evaluation, however, the patient is at increased risk of perioperative renal complications secondary to age>/= 56, male sex, and HTN. Preventative measures include BP monitoring, medication compliance, and hydration management.   CMP-Pending  Creatinine-  GFR-    Endocrine:  No diagnosis or significant findings on chart review or clinical presentation and evaluation.   [id number]    Hematologic/Immunology:  No diagnosis or significant findings on chart review or clinical presentation and evaluation.  The patient is not a Jehovah’s witness and will accept blood and blood products if medically indicated.   History of previous blood transfusions No  CBC-Pending  HGB-Pending  Caprini Score 9, patient at High for postoperative DVT. Pt supplied education/VTE handout  Anticoagulation use: Yes   Aspirin-Continue in the preoperative period. Patient aware and has no questions at this  time.     Gastrointestinal:   No diagnosis or significant findings on chart review or clinical presentation and evaluation.   Recreational drug use: alcohol  Alcohol use social drinker    Infectious disease:   No diagnosis or significant findings on chart review or clinical presentation and evaluation.   Prescription provided for CHG body wash and dental rinse. CHG use instructions reviewed and provided to patient.  Staph screen collected-Pending    Musculoskeletal:   No diagnosis or significant findings on chart review or clinical presentation and evaluation.   JHFRAT score-10 points. moderate risk for falls    Anesthesia:  ASA 3 - Patient with moderate systemic disease with functional limitations  Anticipated anesthesia-general  History of General anesthesia- yes  Complications- Post operative urinary retention in past  No family history of anesthesia complications    Labs & Imaging ordered:  CBC, CMP, HBA1C, MRSA, EKG, BNP  Nickel/metal allergy-negative  Shellfish allergy-negative    Overall, patient Moderate Risk for the scheduled Moderate Risk surgery. Discussed with patient medication instructions, NPO guidelines, and any questions or concerns.     Face to face time spent 45 minutes

## 2025-03-26 NOTE — PREPROCEDURE INSTRUCTIONS
Medication List            Accurate as of March 26, 2025  1:42 PM. Always use your most recent med list.                aspirin 81 mg EC tablet  Medication Adjustments for Surgery: Take/Use as prescribed     * chlorhexidine 4 % external liquid  Commonly known as: Hibiclens  Apply topically once daily for 5 days.  Medication Adjustments for Surgery: Take/Use as prescribed     * chlorhexidine 0.12 % solution  Commonly known as: Peridex  Use 15 mL in the mouth or throat once daily for 2 doses. 15 ML night before surgery and 15 ML morning of surgery. Swish and spit  Medication Adjustments for Surgery: Take/Use as prescribed     hydroCHLOROthiazide 25 mg tablet  Commonly known as: HYDRODiuril  TAKE 1 TABLET BY MOUTH EVERY DAY  Medication Adjustments for Surgery: Take/Use as prescribed     latanoprost 0.005 % ophthalmic solution  Commonly known as: Xalatan  Medication Adjustments for Surgery: Take/Use as prescribed     multivitamin tablet  Additional Medication Adjustments for Surgery: Take last dose 7 days before surgery  Notes to patient: Last dose preoperatively 3/31/2025     rosuvastatin 20 mg tablet  Commonly known as: Crestor  Take 1 tablet (20 mg) by mouth once daily.  Medication Adjustments for Surgery: Take/Use as prescribed           * This list has 2 medication(s) that are the same as other medications prescribed for you. Read the directions carefully, and ask your doctor or other care provider to review them with you.                NPO Instructions:     Do not eat any food after midnight the night before your surgery/procedure.  You may have clear liquids until TWO hours before surgery/procedure. This includes water, black tea/coffee, (no milk or cream) apple juice and electrolyte drinks (Gatorade).  You may chew gum up to TWO hours before your surgery/procedure.     Additional Instructions:      Seven/Six Days before Surgery:  Review your medication instructions, stop indicated medications  Five Days  before Surgery:  Review your medication instructions, stop indicated medications  Begin using your Hibiclens  Three Days before Surgery:  Review your medication instructions, stop indicated medications  The Day before Surgery:  Start using 0.12% CHG mouthwash  No smoking or alcohol use 24 hours before surgery  Review your medication instructions, stop indicated medications  You will be contacted regarding the time of your arrival to facility and surgery time  Do not eat any food after Midnight  Day of Surgery:  Review your medication instructions, take indicated medications  If you have diabetes, please check your fasting blood sugar upon awakening.  If fasting blood sugar is <80 mg/dl, drink 100 ml of apple juice, time limit of 2 hours before  You may have clear liquids until TWO hours before surgery/procedure.  This includes water, black tea/coffee, (no milk or cream) apple juice and electrolyte drinks (Gatorade)  You may chew gum up to TWO hours before your surgery/procedure  Wear  comfortable loose fitting clothing  Do not use moisturizers, creams, lotions or perfume  All jewelry and valuables should be left at home     CONTACT SURGEON'S OFFICE IF YOU DEVELOP:  * Fever = 100.4 F   * New respiratory symptoms (e.g. cough, shortness of breath, respiratory distress, sore throat)  * Recent loss of taste or smell  *Flu like symptoms such as headache, fatigue or gastrointestinal symptoms  * You develop any open sores, shingles, burning or painful urination   AND/OR:  * You no longer wish to have the surgery.  * Any other personal circumstances change that may lead to the need to cancel or defer this surgery.  *You were admitted to any hospital within one week of your planned procedure.     SMOKING:  *Quitting smoking can make a huge difference to your health and recovery from surgery.    *If you need help with quitting, call 3-800-QUIT-NOW.     THE DAY BEFORE SURGERY:  *Do not eat any food after midnight the night  before your surgery.   *You may have up to TEN OUNCES of clear liquids until TWO hours before your instructed ARRIVAL TIME to hospital. This includes water, black tea/coffee, (no milk or cream) apple juice, clear broth and electrolyte drinks (Gatorade). Please avoid clear liquids that are red in color.   *You may chew gum/mints up to TWO hours before your surgery/procedure.     SURGICAL TIME:  *You will be contacted between 2 p.m. and 3 p.m. the business day before your surgery with your arrival time.  *If you haven't received a call by 3pm, call [phone]  *Scheduled surgery times may change and you will be notified if this occurs-check your personal voicemail for any updates.     ON THE MORNING OF SURGERY:  *Wear comfortable, loose fitting clothing.   *Do not use moisturizers, creams, lotions or perfume.  *All jewelry and valuables should be left at home.  *Prosthetic devices such as contact lenses, hearing aids, dentures, eyelash extensions, hairpins and body piercing must be removed before surgery.     BRING WITH YOU:  *Photo ID and insurance card  *Current list of medications and allergies  *Pacemaker/Defibrillator/Heart stent cards  *CPAP machine and mask  *Slings/splints/crutches  *Copy of your complete Advanced Directive/DHPOA-if applicable  *Neurostimulator implant remote     PARKING AND ARRIVAL:  *Check in at the Main Entrance desk and let them know you are here for surgery.     IF YOU ARE HAVING OUTPATIENT/SAME DAY SURGERY:  *A responsible adult MUST accompany you at the time of discharge and stay with you for 24 hours after your surgery.  *You may NOT drive yourself home after surgery.  *You may use a taxi or ride sharing service ([employer], Uber) to return home ONLY if you are accompanied by a friend or family member.  *Instructions for resuming your medications will be provided by your surgeon.     Thank you for coming to Pre Admission testing.      If I have prescribed medication please don't forget  to  at your pharmacy.      Any questions about today's visit call [phone] and leave a message in the general mailbox.     Patient instructed to ambulate as soon as possible postoperatively to decrease thromboembolic risk.     Fish Cobb, TRINIDAD-CNP     Thank you for visiting the [facility].  If you have any changes to your health condition, please call the surgeons office to alert them and give them details of your symptoms.        Preoperative Fasting Guidelines     Why must I stop eating and drinking near surgery time?  With sedation, food or liquid in your stomach can enter your lungs causing serious complications  Increases nausea and vomiting     When do I need to stop eating and drinking before my surgery?  Do not eat any food after midnight the night before your surgery/procedure.  You may have up to TEN ounces of clear liquid until TWO hours before your instructed arrival time to the hospital.  This includes water, black tea/coffee, (no milk or cream) apple juice, and electrolyte drinks (Gatorade)  You may chew gum until TWO hours before your surgery/procedure        Additional Instructions:      The Day before Surgery:  -Review your medication instructions, stop indicated medications  -You will be contacted in the evening regarding the time of your arrival to facility and surgery time     Day of Surgery:  -Review your medication instructions, take indicated medications  -Wear comfortable loose fitting clothing  -Do not use moisturizers, creams, lotions or perfume  -All jewelry and valuables should be left at home                   Preoperative Brain Exercises     What are brain exercises?  A brain exercise is any activity that engages your thinking (cognitive) skills.     What types of activities are considered brain exercises?  Jigsaw puzzles, crossword puzzles, word jumble, memory games, word search, and many more.  Many can be found free online or on your phone via a  mobile ravinder.     Why should I do brain exercises before my surgery?  More recent research has shown brain exercise before surgery can lower the risk of postoperative delirium (confusion) which can be especially important for older adults.  Patients who did brain exercises for 5 to 10 minutes/day in the days before surgery, cut their risk of postoperative delirium in half up to 1 week after surgery.                         [facility]     Preoperative Deep Breathing Exercises     Why it is important to do deep breathing exercises before my surgery?  Deep breathing exercises strengthen your breathing muscles.  This helps you to recover after your surgery and decreases the chance of breathing complications.        How are the deep breathing exercises done?  Sit straight with your back supported.  Breathe in deeply and slowly through your nose. Your lower rib cage should expand and your abdomen may move forward.  Hold that breath for 3 to 5 seconds.  Breathe out through pursed lips, slowly and completely.  Rest and repeat 10 times every hour while awake.  Rest longer if you become dizzy or lightheaded.                      [facility]     Preoperative Deep Breathing Exercises     Why it is important to do deep breathing exercises before my surgery?  Deep breathing exercises strengthen your breathing muscles.  This helps you to recover after your surgery and decreases the chance of breathing complications.        How are the deep breathing exercises done?  Sit straight with your back supported.  Breathe in deeply and slowly through your nose. Your lower rib cage should expand and your abdomen may move forward.  Hold that breath for 3 to 5 seconds.  Breathe out through pursed lips, slowly and completely.  Rest and repeat 10 times every hour while awake.  Rest longer if you become dizzy or lightheaded.        Patient Information: Incentive Spirometer  What is an incentive  spirometer?  An incentive spirometer is a device used before and after surgery to “exercise” your lungs.  It helps you to take deeper breaths to expand your lungs.  Below is an example of a basic incentive spirometer.  The device you receive may differ slightly but they all function the same.    Why do I need to use an incentive spirometer?  Using your incentive spirometer prepares your lungs for surgery and helps prevent lung problems after surgery.  How do I use my incentive spirometer?  When you're using your incentive spirometer, make sure to breathe through your mouth. If you breathe through your nose, the incentive spirometer won't work properly. You can hold your nose if you have trouble.  If you feel dizzy at any time, stop and rest. Try again at a later time.  Follow the steps below:  Set up your incentive spirometer, expand the flexible tubing and connect to the outlet.  Sit upright in a chair or bed. Hold the incentive spirometer at eye level.   Put the mouthpiece in your mouth and close your lips tightly around it. Slowly breathe out (exhale) completely.  Breathe in (inhale) slowly through your mouth as deeply as you can. As you take a breath, you will see the piston rise inside the large column. While the piston rises, the indicator should move upwards. It should stay in between the 2 arrows (see Figure).  Try to get the piston as high as you can, while keeping the indicator between the arrows.   If the indicator doesn't stay between the arrows, you're breathing either too fast or too slow.  When you get it as high as you can, hold your breath for 10 seconds, or as long as possible. While you're holding your breath, the piston will slowly fall to the base of the spirometer.  Once the piston reaches the bottom of the spirometer, breathe out slowly through your mouth. Rest for a few seconds.  Repeat 10 times. Try to get the piston to the same level with each breath.  Repeat every hour while awake  You can  carefully clean the outside of the mouthpiece with an alcohol wipe or soap and water.       Patient and Family Education             Ways You Can Help Prevent Blood Clots                    This handout explains some simple things you can do to help prevent blood clots.      Blood clots are blockages that can form in the body's veins. When a blood clot forms in your deep veins, it may be called a deep vein thrombosis, or DVT for short. Blood clots can happen in any part of the body where blood flows, but they are most common in the arms and legs. If a piece of a blood clot breaks free and travels to the lungs, it is called a pulmonary embolus (PE). A PE can be a very serious problem.         Being in the hospital or having surgery can raise your chances of getting a blood clot because you may not be well enough to move around as much as you normally do.         Ways you can help prevent blood clots in the hospital           Wearing SCDs. SCDs stands for Sequential Compression Devices.   SCDs are special sleeves that wrap around your legs  They attach to a pump that fills them with air to gently squeeze your legs every few minutes.   This helps return the blood in your legs to your heart.   SCDs should only be taken off when walking or bathing.   SCDs may not be comfortable, but they can help save your life.                                            Wearing compression stockings - if your doctor orders them. These special snug fitting stockings gently squeeze your legs to help blood flow.       Walking. Walking helps move the blood in your legs.   If your doctor says it is ok, try walking the halls at least   5 times a day. Ask us to help you get up, so you don't fall.      Taking any blood thinning medicines your doctor orders.        Page 1 of 2            [facility]; 3/23   Ways you can help prevent blood clots at home         Wearing compression stockings - if your doctor orders them. ? Walking - to  help move the blood in your legs.       Taking any blood thinning medicines your doctor orders.      Signs of a blood clot or PE        Tell your doctor or nurse know right away if you have of the problems listed below.    If you are at home, seek medical care right away. Call 911 for chest pain or problems breathing.                Signs of a blood clot (DVT) - such as pain,  swelling, redness or warmth in your arm or leg      Signs of a pulmonary embolism (PE) - such as chest pain or feeling short of breath    Patient Information: Pre-Operative Infection Prevention Measures     Why did I have my nose, under my arms, and groin swabbed?  The purpose of the swab is to identify Staphylococcus aureus inside your nose or on your skin.  The swab was sent to the laboratory for culture.  A positive swab/culture for Staphylococcus aureus is called colonization or carriage.      What is Staphylococcus aureus?  Staphylococcus aureus, also known as “staph”, is a germ found on the skin or in the nose of healthy people.  Sometimes Staphylococcus aureus can get into the body and cause an infection.  This can be minor (such as pimples, boils, or other skin problems).  It might also be serious (such as a blood infection, pneumonia, or a surgical site infection).    What is Staphylococcus aureus colonization or carriage?  Colonization or carriage means that a person has the germ but is not sick from it.  These bacteria can be spread on the hands or when breathing or sneezing.    How is Staphylococcus aureus spread?  It is most often spread by close contact with a person or item that carries it.    What happens if my culture is positive for Staphylococcus aureus?  Your doctor/medical team will use this information to guide any antibiotic treatment which may be necessary.  Regardless of the culture results, we will clean the inside of your nose with a betadine swab just before you have your surgery.      Will I get an infection if I  have Staphylococcus aureus in my nose or on my skin?  Anyone can get an infection with Staphylococcus aureus.  However, the best way to reduce your risk of infection is to follow the instructions provided to you for the use of your CHG soap and dental rinse.        Patient Information: Oral/Dental Rinse    What is oral/dental rinse?   It is a mouthwash. It is a way of cleaning the mouth with a germ-killing solution before your surgery.  The solution contains chlorhexidine, commonly known as CHG.   It is used inside the mouth to kill a bacteria known as Staphylococcus aureus.  Let your doctor know if you are allergic to Chlorhexidine.    We have sent a prescription for CHG 0.12% mouthwash to your preferred pharmacy.  If you have not already, Please  your prescription and start using the day before before surgery.  Follow the instruction sheet provided to you at your CPM/PAT appointment. Please contact [facility] if you do not receive your CHG mouthwash prescription.     Why do I need to use CHG oral/dental rinse?  The CHG oral/dental rinse helps to kill a bacteria in your mouth known as Staphylococcus aureus.     This reduces the risk of infection at the surgical site.      Using your CHG oral/dental rinse  STEPS:  Use your CHG oral/dental rinse after you brush your teeth the night before (at bedtime) and the morning of your surgery.  Follow all directions on your prescription label.    Use the cap on the container to measure 15ml   Swish (gargle if you can) the mouthwash in your mouth for at least 30 seconds, (do not swallow) and spit out  After you use your CHG rinse, do not rinse your mouth with water, drink or eat.  Please refer to the prescription label for the appropriate time to resume oral intake      What side effects might I have using the CHG oral/dental rinse?  CHG rinse will stick to plaque on the teeth.  Brush and floss just before use.  Teeth brushing will help avoid staining of plaque during  use.      Patient Information: Home Preoperative Antibacterial Shower      What is a home preoperative antibacterial shower?  This shower is a way of cleaning the skin with a germ-killing solution before surgery.  The solution contains chlorhexidine, commonly known as CHG.  CHG is a skin cleanser with germ-killing ability.  Let your doctor know if you are allergic to chlorhexidine.    Why do I need to take a preoperative antibacterial shower?  Skin is not sterile.  It is best to try to make your skin as free of germs as possible before surgery.  Proper cleansing with a germ-killing soap before surgery can lower the number of germs on your skin.  This helps to reduce the risk of infection at the surgical site.  Following the instructions listed below will help you prepare your skin for surgery.      How do I use the solution?  Steps:  Begin using your CHG soap 5 days before your scheduled surgery on 4/3/2025.    First, wash and rinse your hair using the CHG soap. Keep CHG soap away from ear canals and eyes.  Rinse completely, do not condition.  Hair extensions should be removed.  Wash your face with your normal soap and rinse.    Apply the CHG solution to a clean wet washcloth.  Turn the water off or move away from the water spray to avoid premature rinsing of the CHG soap as you are applying.   Firmly lather your entire body from the neck down.  Do not use on your face.  Pay special attention to the area(s) where your incision(s) will be located unless they are on your face.  Avoid scrubbing your skin too hard.  The important point is to have the CHG soap sit on your skin for 3 minutes.    When the 3 minutes are up, turn on the water and rinse the CHG solution off your body completely.   DO NOT wash with regular soap after you have used the CHG soap solution  Pat yourself dry with a clean, freshly-laundered towel.  DO NOT apply powders, deodorants, or lotions.  Dress in clean, freshly laundered nightclothes.    Be  sure to sleep with clean, freshly laundered sheets.  Be aware that CHG will cause stains on fabrics; if you wash them with bleach after use.  Rinse your washcloth and other linens that have contact with CHG completely.  Use only non-chlorine detergents to launder the items used.   The morning of surgery is the fifth day.  Repeat the above steps and dress in clean comfortable clothing     Whom should I contact if I have any questions regarding the use of CHG soap?  Call the [facility] at [phone] if you have any questions.

## 2025-03-27 LAB
ATRIAL RATE: 72 BPM
BNP SERPL-MCNC: 45 PG/ML (ref 0–99)
P OFFSET: 160 MS
P ONSET: 128 MS
PR INTERVAL: 188 MS
Q ONSET: 222 MS
QRS COUNT: 11 BEATS
QRS DURATION: 84 MS
QT INTERVAL: 390 MS
QTC CALCULATION(BAZETT): 427 MS
QTC FREDERICIA: 414 MS
R AXIS: 16 DEGREES
T AXIS: 72 DEGREES
T OFFSET: 417 MS
VENTRICULAR RATE: 72 BPM

## 2025-03-28 LAB — STAPHYLOCOCCUS SPEC CULT: ABNORMAL

## 2025-04-01 ENCOUNTER — APPOINTMENT (OUTPATIENT)
Dept: PRIMARY CARE | Facility: CLINIC | Age: 80
End: 2025-04-01
Payer: MEDICARE

## 2025-04-01 VITALS
DIASTOLIC BLOOD PRESSURE: 72 MMHG | SYSTOLIC BLOOD PRESSURE: 107 MMHG | WEIGHT: 170 LBS | OXYGEN SATURATION: 96 % | HEART RATE: 69 BPM | BODY MASS INDEX: 25.76 KG/M2 | HEIGHT: 68 IN | TEMPERATURE: 97.8 F

## 2025-04-01 DIAGNOSIS — I35.0 MODERATE AORTIC STENOSIS BY PRIOR ECHOCARDIOGRAM: ICD-10-CM

## 2025-04-01 DIAGNOSIS — M17.12 UNILATERAL PRIMARY OSTEOARTHRITIS, LEFT KNEE: ICD-10-CM

## 2025-04-01 DIAGNOSIS — E78.2 MIXED HYPERLIPIDEMIA: Primary | ICD-10-CM

## 2025-04-01 DIAGNOSIS — I25.84 CORONARY ATHEROSCLEROSIS DUE TO SEVERELY CALCIFIED CORONARY LESION: ICD-10-CM

## 2025-04-01 DIAGNOSIS — I10 ESSENTIAL HYPERTENSION, BENIGN: ICD-10-CM

## 2025-04-01 LAB
CHOLEST SERPL-MCNC: 141 MG/DL (ref 0–199)
CHOLESTEROL/HDL RATIO: 2.1
HDLC SERPL-MCNC: 68.2 MG/DL
LDLC SERPL CALC-MCNC: 57 MG/DL
NON HDL CHOLESTEROL: 73 MG/DL (ref 0–149)
TRIGL SERPL-MCNC: 79 MG/DL (ref 0–149)
VLDL: 16 MG/DL (ref 0–40)

## 2025-04-01 ASSESSMENT — ACTIVITIES OF DAILY LIVING (ADL)
DOING_HOUSEWORK: INDEPENDENT
GROCERY_SHOPPING: INDEPENDENT
DRESSING: INDEPENDENT
BATHING: INDEPENDENT
MANAGING_FINANCES: INDEPENDENT
TAKING_MEDICATION: INDEPENDENT

## 2025-04-01 ASSESSMENT — PATIENT HEALTH QUESTIONNAIRE - PHQ9
2. FEELING DOWN, DEPRESSED OR HOPELESS: NOT AT ALL
SUM OF ALL RESPONSES TO PHQ9 QUESTIONS 1 AND 2: 0
1. LITTLE INTEREST OR PLEASURE IN DOING THINGS: NOT AT ALL

## 2025-04-01 ASSESSMENT — ENCOUNTER SYMPTOMS
LOSS OF SENSATION IN FEET: 0
DEPRESSION: 0
OCCASIONAL FEELINGS OF UNSTEADINESS: 0

## 2025-04-01 NOTE — PROGRESS NOTES
Subjective   Patient ID: Montana Gagnon is a 80 y.o. male who presents for No chief complaint on file..  HPI    L knee arthritis: planned for L knee replacement     HTN, HLD: no lightheadedness, dizziness, headache, blurry, chest pain, palpitations, dyspnea, syncope.no issues with increased crestor. No dyspnea on exertion.     Moderate AS on a bicuspid AV: Follows with cards Dr. Stover     Constipation: uses OTC docolax    Social: lives with wife who is doing well     Review of Systems no abd pain, diarrhea, dysuria, retention, weakness, joint pain, back pain,     Objective   Physical Exam  Constitutional:       General: He is not in acute distress.     Appearance: He is not toxic-appearing.   HENT:      Head: Normocephalic and atraumatic.      Nose: Nose normal.      Mouth/Throat:      Mouth: Mucous membranes are moist.   Eyes:      General: No scleral icterus.     Extraocular Movements: Extraocular movements intact.   Cardiovascular:      Rate and Rhythm: Normal rate and regular rhythm.      Heart sounds: Murmur (biphasic midsystolic murmur) heard.   Pulmonary:      Effort: Pulmonary effort is normal. No respiratory distress.      Breath sounds: No wheezing, rhonchi or rales.   Abdominal:      General: Abdomen is flat. There is no distension.      Palpations: Abdomen is soft.      Tenderness: There is no abdominal tenderness.   Musculoskeletal:         General: Normal range of motion.      Cervical back: Normal range of motion.      Right lower leg: No edema.      Left lower leg: No edema.   Lymphadenopathy:      Cervical: No cervical adenopathy.   Skin:     General: Skin is warm.      Capillary Refill: Capillary refill takes less than 2 seconds.      Findings: No rash.   Neurological:      General: No focal deficit present.      Mental Status: He is alert.   Psychiatric:         Mood and Affect: Mood normal.         Assessment/Plan   Mr. Montana Gagnon is a 80 y.o. w/ a PMH of HTN, HLD, CAD, mod aortic stenosis  and OA presenting for a medicare wellness visit.      #CAD, HLD  :: Coronary Ca Score with severe coronary calcifications (608 LAD>LM) and severe AV plane calcifications (2566 AU valve score   - Crestor 20mg every day   - Aspirin 81mg every day   - add on lipid panel to labs done 3/26     #Aortic Stenosis   :: moderate AS on a bicuspid AV, mild aorta dilation with STJ effacement    - Continue follow up with Dr. Stover, plan for repeat echo soon    #HTN  - Cont hydrochlorothiazide 25mg every day     #L knee OA   - Follows with ortho Dr. Barclay  - Plan for knee replacement     #Macular degeneration  #Hx of retinal detatchment s/p repair   - Follows w/ ophth Dr. Laura at [facility]     #Prediabetes   :: A1c 5.7 3/26/25   - Discussed diet and exercise     #Health maintenance  - Last colonoscopy: Normal Cologuard test 2022  - DEXA 10/14/2019, unremarkable  - PSA: N/A, no symptoms  - Smoking history:history of about 30 pack years, remote  - Counseled regarding diet and exercise  - Immunizations: current  - Followup annually and as needed    Patient seen by and plan discussed with Dr. Irizarry.     Liss Harrison MD  Internal Medicine-Pediatrics, PGY-2

## 2025-04-08 ENCOUNTER — HOME HEALTH ADMISSION (OUTPATIENT)
Dept: HOME HEALTH SERVICES | Facility: HOME HEALTH | Age: 80
End: 2025-04-08
Payer: MEDICARE

## 2025-04-08 ENCOUNTER — APPOINTMENT (OUTPATIENT)
Dept: RADIOLOGY | Facility: HOSPITAL | Age: 80
End: 2025-04-08
Payer: MEDICARE

## 2025-04-08 ENCOUNTER — ANESTHESIA (OUTPATIENT)
Dept: OPERATING ROOM | Facility: HOSPITAL | Age: 80
End: 2025-04-08
Payer: MEDICARE

## 2025-04-08 ENCOUNTER — HOSPITAL ENCOUNTER (OUTPATIENT)
Facility: HOSPITAL | Age: 80
Setting detail: OUTPATIENT SURGERY
Discharge: HOME HEALTH | End: 2025-04-08
Attending: STUDENT IN AN ORGANIZED HEALTH CARE EDUCATION/TRAINING PROGRAM | Admitting: STUDENT IN AN ORGANIZED HEALTH CARE EDUCATION/TRAINING PROGRAM
Payer: MEDICARE

## 2025-04-08 ENCOUNTER — ANESTHESIA EVENT (OUTPATIENT)
Dept: OPERATING ROOM | Facility: HOSPITAL | Age: 80
End: 2025-04-08
Payer: MEDICARE

## 2025-04-08 ENCOUNTER — DOCUMENTATION (OUTPATIENT)
Dept: HOME HEALTH SERVICES | Facility: HOME HEALTH | Age: 80
End: 2025-04-08

## 2025-04-08 ENCOUNTER — PHARMACY VISIT (OUTPATIENT)
Dept: PHARMACY | Facility: CLINIC | Age: 80
End: 2025-04-08
Payer: COMMERCIAL

## 2025-04-08 VITALS
WEIGHT: 173.28 LBS | OXYGEN SATURATION: 96 % | HEART RATE: 68 BPM | BODY MASS INDEX: 26.26 KG/M2 | SYSTOLIC BLOOD PRESSURE: 145 MMHG | HEIGHT: 68 IN | TEMPERATURE: 97.7 F | DIASTOLIC BLOOD PRESSURE: 82 MMHG | RESPIRATION RATE: 16 BRPM

## 2025-04-08 DIAGNOSIS — M17.12 UNILATERAL PRIMARY OSTEOARTHRITIS, LEFT KNEE: Primary | ICD-10-CM

## 2025-04-08 PROCEDURE — 27447 TOTAL KNEE ARTHROPLASTY: CPT | Performed by: STUDENT IN AN ORGANIZED HEALTH CARE EDUCATION/TRAINING PROGRAM

## 2025-04-08 PROCEDURE — 2720000007 HC OR 272 NO HCPCS: Performed by: STUDENT IN AN ORGANIZED HEALTH CARE EDUCATION/TRAINING PROGRAM

## 2025-04-08 PROCEDURE — 2500000004 HC RX 250 GENERAL PHARMACY W/ HCPCS (ALT 636 FOR OP/ED): Performed by: NURSE ANESTHETIST, CERTIFIED REGISTERED

## 2025-04-08 PROCEDURE — 7100000010 HC PHASE TWO TIME - EACH INCREMENTAL 1 MINUTE: Performed by: STUDENT IN AN ORGANIZED HEALTH CARE EDUCATION/TRAINING PROGRAM

## 2025-04-08 PROCEDURE — C1776 JOINT DEVICE (IMPLANTABLE): HCPCS | Performed by: STUDENT IN AN ORGANIZED HEALTH CARE EDUCATION/TRAINING PROGRAM

## 2025-04-08 PROCEDURE — C1713 ANCHOR/SCREW BN/BN,TIS/BN: HCPCS | Performed by: STUDENT IN AN ORGANIZED HEALTH CARE EDUCATION/TRAINING PROGRAM

## 2025-04-08 PROCEDURE — 99100 ANES PT EXTEME AGE<1 YR&>70: CPT | Performed by: ANESTHESIOLOGY

## 2025-04-08 PROCEDURE — 64999 UNLISTED PX NERVOUS SYSTEM: CPT | Performed by: ANESTHESIOLOGY

## 2025-04-08 PROCEDURE — 97530 THERAPEUTIC ACTIVITIES: CPT | Mod: GP

## 2025-04-08 PROCEDURE — 3600000017 HC OR TIME - EACH INCREMENTAL 1 MINUTE - PROCEDURE LEVEL SIX: Performed by: STUDENT IN AN ORGANIZED HEALTH CARE EDUCATION/TRAINING PROGRAM

## 2025-04-08 PROCEDURE — 9420000001 HC RT PATIENT EDUCATION 5 MIN

## 2025-04-08 PROCEDURE — 94760 N-INVAS EAR/PLS OXIMETRY 1: CPT | Mod: 59

## 2025-04-08 PROCEDURE — 73560 X-RAY EXAM OF KNEE 1 OR 2: CPT | Mod: LT

## 2025-04-08 PROCEDURE — 7100000009 HC PHASE TWO TIME - INITIAL BASE CHARGE: Performed by: STUDENT IN AN ORGANIZED HEALTH CARE EDUCATION/TRAINING PROGRAM

## 2025-04-08 PROCEDURE — 2500000005 HC RX 250 GENERAL PHARMACY W/O HCPCS: Performed by: STUDENT IN AN ORGANIZED HEALTH CARE EDUCATION/TRAINING PROGRAM

## 2025-04-08 PROCEDURE — 64473 LWR XTR FSCL PLN BLK UNI NJX: CPT | Mod: LT

## 2025-04-08 PROCEDURE — A27447 PR TOTAL KNEE ARTHROPLASTY: Performed by: NURSE ANESTHETIST, CERTIFIED REGISTERED

## 2025-04-08 PROCEDURE — A27447 PR TOTAL KNEE ARTHROPLASTY: Performed by: ANESTHESIOLOGY

## 2025-04-08 PROCEDURE — 2500000004 HC RX 250 GENERAL PHARMACY W/ HCPCS (ALT 636 FOR OP/ED): Performed by: ANESTHESIOLOGY

## 2025-04-08 PROCEDURE — 64447 NJX AA&/STRD FEMORAL NRV IMG: CPT | Performed by: ANESTHESIOLOGY

## 2025-04-08 PROCEDURE — 7100000001 HC RECOVERY ROOM TIME - INITIAL BASE CHARGE: Performed by: STUDENT IN AN ORGANIZED HEALTH CARE EDUCATION/TRAINING PROGRAM

## 2025-04-08 PROCEDURE — 2780000003 HC OR 278 NO HCPCS: Performed by: STUDENT IN AN ORGANIZED HEALTH CARE EDUCATION/TRAINING PROGRAM

## 2025-04-08 PROCEDURE — 3700000001 HC GENERAL ANESTHESIA TIME - INITIAL BASE CHARGE: Performed by: STUDENT IN AN ORGANIZED HEALTH CARE EDUCATION/TRAINING PROGRAM

## 2025-04-08 PROCEDURE — 3700000002 HC GENERAL ANESTHESIA TIME - EACH INCREMENTAL 1 MINUTE: Performed by: STUDENT IN AN ORGANIZED HEALTH CARE EDUCATION/TRAINING PROGRAM

## 2025-04-08 PROCEDURE — 97161 PT EVAL LOW COMPLEX 20 MIN: CPT | Mod: GP

## 2025-04-08 PROCEDURE — 97110 THERAPEUTIC EXERCISES: CPT | Mod: GP

## 2025-04-08 PROCEDURE — 3600000018 HC OR TIME - INITIAL BASE CHARGE - PROCEDURE LEVEL SIX: Performed by: STUDENT IN AN ORGANIZED HEALTH CARE EDUCATION/TRAINING PROGRAM

## 2025-04-08 PROCEDURE — 7100000002 HC RECOVERY ROOM TIME - EACH INCREMENTAL 1 MINUTE: Performed by: STUDENT IN AN ORGANIZED HEALTH CARE EDUCATION/TRAINING PROGRAM

## 2025-04-08 PROCEDURE — 73560 X-RAY EXAM OF KNEE 1 OR 2: CPT | Mod: LEFT SIDE | Performed by: RADIOLOGY

## 2025-04-08 PROCEDURE — 2500000004 HC RX 250 GENERAL PHARMACY W/ HCPCS (ALT 636 FOR OP/ED): Performed by: STUDENT IN AN ORGANIZED HEALTH CARE EDUCATION/TRAINING PROGRAM

## 2025-04-08 PROCEDURE — RXMED WILLOW AMBULATORY MEDICATION CHARGE

## 2025-04-08 PROCEDURE — 2500000005 HC RX 250 GENERAL PHARMACY W/O HCPCS: Performed by: NURSE ANESTHETIST, CERTIFIED REGISTERED

## 2025-04-08 DEVICE — CRUCIATE RETAINING FEMORAL
Type: IMPLANTABLE DEVICE | Site: FEMUR | Status: FUNCTIONAL
Brand: TRIATHLON

## 2025-04-08 DEVICE — TIBIAL BEARING INSERT - CS
Type: IMPLANTABLE DEVICE | Site: TIBIA | Status: FUNCTIONAL
Brand: TRIATHLON

## 2025-04-08 DEVICE — TIBIAL COMPONENT
Type: IMPLANTABLE DEVICE | Site: TIBIA | Status: FUNCTIONAL
Brand: TRIATHLON

## 2025-04-08 RX ORDER — ACETAMINOPHEN 325 MG/1
650 TABLET ORAL EVERY 6 HOURS SCHEDULED
Status: DISCONTINUED | OUTPATIENT
Start: 2025-04-08 | End: 2025-04-08 | Stop reason: HOSPADM

## 2025-04-08 RX ORDER — ONDANSETRON HYDROCHLORIDE 2 MG/ML
4 INJECTION, SOLUTION INTRAVENOUS ONCE AS NEEDED
Status: DISCONTINUED | OUTPATIENT
Start: 2025-04-08 | End: 2025-04-08 | Stop reason: HOSPADM

## 2025-04-08 RX ORDER — POLYETHYLENE GLYCOL 3350 17 G/17G
17 POWDER, FOR SOLUTION ORAL DAILY
Status: DISCONTINUED | OUTPATIENT
Start: 2025-04-08 | End: 2025-04-08 | Stop reason: HOSPADM

## 2025-04-08 RX ORDER — PHENYLEPHRINE HCL IN 0.9% NACL 1 MG/10 ML
SYRINGE (ML) INTRAVENOUS AS NEEDED
Status: DISCONTINUED | OUTPATIENT
Start: 2025-04-08 | End: 2025-04-08

## 2025-04-08 RX ORDER — BUPIVACAINE HYDROCHLORIDE 2.5 MG/ML
INJECTION, SOLUTION INFILTRATION; PERINEURAL AS NEEDED
Status: DISCONTINUED | OUTPATIENT
Start: 2025-04-08 | End: 2025-04-08

## 2025-04-08 RX ORDER — BUPIVACAINE HCL/EPINEPHRINE 0.5-1:200K
VIAL (ML) INJECTION AS NEEDED
Status: DISCONTINUED | OUTPATIENT
Start: 2025-04-08 | End: 2025-04-08 | Stop reason: HOSPADM

## 2025-04-08 RX ORDER — ONDANSETRON 4 MG/1
4 TABLET, FILM COATED ORAL EVERY 8 HOURS PRN
Qty: 20 TABLET | Refills: 0 | Status: SHIPPED | OUTPATIENT
Start: 2025-04-08

## 2025-04-08 RX ORDER — BISACODYL 5 MG
10 TABLET, DELAYED RELEASE (ENTERIC COATED) ORAL DAILY PRN
Status: DISCONTINUED | OUTPATIENT
Start: 2025-04-08 | End: 2025-04-08 | Stop reason: HOSPADM

## 2025-04-08 RX ORDER — OXYCODONE HYDROCHLORIDE 5 MG/1
5 TABLET ORAL EVERY 4 HOURS PRN
Status: DISCONTINUED | OUTPATIENT
Start: 2025-04-08 | End: 2025-04-08 | Stop reason: HOSPADM

## 2025-04-08 RX ORDER — OXYCODONE HYDROCHLORIDE 5 MG/1
5-10 TABLET ORAL EVERY 6 HOURS PRN
Qty: 40 TABLET | Refills: 0 | Status: SHIPPED | OUTPATIENT
Start: 2025-04-08 | End: 2025-04-15

## 2025-04-08 RX ORDER — SCOPOLAMINE 1 MG/3D
1 PATCH, EXTENDED RELEASE TRANSDERMAL ONCE
Status: DISCONTINUED | OUTPATIENT
Start: 2025-04-08 | End: 2025-04-08 | Stop reason: HOSPADM

## 2025-04-08 RX ORDER — MEPERIDINE HYDROCHLORIDE 25 MG/ML
12.5 INJECTION INTRAMUSCULAR; INTRAVENOUS; SUBCUTANEOUS EVERY 10 MIN PRN
Status: DISCONTINUED | OUTPATIENT
Start: 2025-04-08 | End: 2025-04-08 | Stop reason: HOSPADM

## 2025-04-08 RX ORDER — KETOROLAC TROMETHAMINE 30 MG/ML
INJECTION, SOLUTION INTRAMUSCULAR; INTRAVENOUS AS NEEDED
Status: DISCONTINUED | OUTPATIENT
Start: 2025-04-08 | End: 2025-04-08

## 2025-04-08 RX ORDER — FENTANYL CITRATE 50 UG/ML
50 INJECTION, SOLUTION INTRAMUSCULAR; INTRAVENOUS ONCE
Status: COMPLETED | OUTPATIENT
Start: 2025-04-08 | End: 2025-04-08

## 2025-04-08 RX ORDER — PANTOPRAZOLE SODIUM 40 MG/1
40 TABLET, DELAYED RELEASE ORAL
Status: DISCONTINUED | OUTPATIENT
Start: 2025-04-09 | End: 2025-04-08 | Stop reason: HOSPADM

## 2025-04-08 RX ORDER — ONDANSETRON HYDROCHLORIDE 2 MG/ML
INJECTION, SOLUTION INTRAVENOUS AS NEEDED
Status: DISCONTINUED | OUTPATIENT
Start: 2025-04-08 | End: 2025-04-08

## 2025-04-08 RX ORDER — CEFAZOLIN SODIUM 2 G/100ML
2 INJECTION, SOLUTION INTRAVENOUS EVERY 8 HOURS
Status: DISCONTINUED | OUTPATIENT
Start: 2025-04-08 | End: 2025-04-08 | Stop reason: HOSPADM

## 2025-04-08 RX ORDER — TRAMADOL HYDROCHLORIDE 50 MG/1
50-100 TABLET ORAL EVERY 6 HOURS PRN
Qty: 40 TABLET | Refills: 0 | Status: SHIPPED | OUTPATIENT
Start: 2025-04-08 | End: 2025-04-15

## 2025-04-08 RX ORDER — MIDAZOLAM HYDROCHLORIDE 1 MG/ML
2 INJECTION, SOLUTION INTRAMUSCULAR; INTRAVENOUS ONCE
Status: COMPLETED | OUTPATIENT
Start: 2025-04-08 | End: 2025-04-08

## 2025-04-08 RX ORDER — FENTANYL CITRATE 50 UG/ML
50 INJECTION, SOLUTION INTRAMUSCULAR; INTRAVENOUS EVERY 5 MIN PRN
Status: DISCONTINUED | OUTPATIENT
Start: 2025-04-08 | End: 2025-04-08 | Stop reason: HOSPADM

## 2025-04-08 RX ORDER — IBUPROFEN 600 MG/1
600 TABLET ORAL 3 TIMES DAILY
Status: DISCONTINUED | OUTPATIENT
Start: 2025-04-09 | End: 2025-04-08 | Stop reason: HOSPADM

## 2025-04-08 RX ORDER — MELOXICAM 15 MG/1
15 TABLET ORAL DAILY
Qty: 60 TABLET | Refills: 0 | Status: SHIPPED | OUTPATIENT
Start: 2025-04-08 | End: 2025-06-07

## 2025-04-08 RX ORDER — ONDANSETRON 4 MG/1
4 TABLET, ORALLY DISINTEGRATING ORAL EVERY 8 HOURS PRN
Status: DISCONTINUED | OUTPATIENT
Start: 2025-04-08 | End: 2025-04-08 | Stop reason: HOSPADM

## 2025-04-08 RX ORDER — LABETALOL HYDROCHLORIDE 5 MG/ML
5 INJECTION, SOLUTION INTRAVENOUS ONCE AS NEEDED
Status: DISCONTINUED | OUTPATIENT
Start: 2025-04-08 | End: 2025-04-08 | Stop reason: HOSPADM

## 2025-04-08 RX ORDER — CEFADROXIL 500 MG/1
500 CAPSULE ORAL 2 TIMES DAILY
Qty: 14 CAPSULE | Refills: 0 | Status: SHIPPED | OUTPATIENT
Start: 2025-04-08 | End: 2025-04-15

## 2025-04-08 RX ORDER — SENNOSIDES 8.6 MG/1
1 TABLET ORAL DAILY
Qty: 15 TABLET | Refills: 0 | Status: SHIPPED | OUTPATIENT
Start: 2025-04-08 | End: 2025-04-23

## 2025-04-08 RX ORDER — KETOROLAC TROMETHAMINE 15 MG/ML
15 INJECTION, SOLUTION INTRAMUSCULAR; INTRAVENOUS EVERY 6 HOURS
Status: DISCONTINUED | OUTPATIENT
Start: 2025-04-08 | End: 2025-04-08 | Stop reason: HOSPADM

## 2025-04-08 RX ORDER — PHENYLEPHRINE 10 MG/250 ML(40 MCG/ML)IN 0.9 % SOD.CHLORIDE INTRAVENOUS
CONTINUOUS PRN
Status: DISCONTINUED | OUTPATIENT
Start: 2025-04-08 | End: 2025-04-08

## 2025-04-08 RX ORDER — PANTOPRAZOLE SODIUM 40 MG/1
40 TABLET, DELAYED RELEASE ORAL
Qty: 30 TABLET | Refills: 0 | Status: SHIPPED | OUTPATIENT
Start: 2025-04-08 | End: 2025-05-08

## 2025-04-08 RX ORDER — SODIUM CHLORIDE, SODIUM LACTATE, POTASSIUM CHLORIDE, CALCIUM CHLORIDE 600; 310; 30; 20 MG/100ML; MG/100ML; MG/100ML; MG/100ML
50 INJECTION, SOLUTION INTRAVENOUS CONTINUOUS
Status: DISCONTINUED | OUTPATIENT
Start: 2025-04-08 | End: 2025-04-08 | Stop reason: HOSPADM

## 2025-04-08 RX ORDER — ACETAMINOPHEN 500 MG
1000 TABLET ORAL EVERY 8 HOURS
Qty: 360 TABLET | Refills: 0 | Status: SHIPPED | OUTPATIENT
Start: 2025-04-08 | End: 2025-06-07

## 2025-04-08 RX ORDER — BUPIVACAINE HYDROCHLORIDE 5 MG/ML
INJECTION, SOLUTION PERINEURAL AS NEEDED
Status: DISCONTINUED | OUTPATIENT
Start: 2025-04-08 | End: 2025-04-08

## 2025-04-08 RX ORDER — LIDOCAINE HYDROCHLORIDE 10 MG/ML
0.1 INJECTION, SOLUTION EPIDURAL; INFILTRATION; INTRACAUDAL; PERINEURAL ONCE
Status: DISCONTINUED | OUTPATIENT
Start: 2025-04-08 | End: 2025-04-08 | Stop reason: HOSPADM

## 2025-04-08 RX ORDER — ROCURONIUM BROMIDE 10 MG/ML
INJECTION, SOLUTION INTRAVENOUS AS NEEDED
Status: DISCONTINUED | OUTPATIENT
Start: 2025-04-08 | End: 2025-04-08

## 2025-04-08 RX ORDER — TRANEXAMIC ACID 100 MG/ML
INJECTION, SOLUTION INTRAVENOUS AS NEEDED
Status: DISCONTINUED | OUTPATIENT
Start: 2025-04-08 | End: 2025-04-08

## 2025-04-08 RX ORDER — MUPIROCIN 20 MG/G
OINTMENT TOPICAL 2 TIMES DAILY
Qty: 22 G | Refills: 0 | Status: SHIPPED | OUTPATIENT
Start: 2025-04-08 | End: 2025-04-13

## 2025-04-08 RX ORDER — SODIUM CHLORIDE, SODIUM LACTATE, POTASSIUM CHLORIDE, CALCIUM CHLORIDE 600; 310; 30; 20 MG/100ML; MG/100ML; MG/100ML; MG/100ML
INJECTION, SOLUTION INTRAVENOUS CONTINUOUS PRN
Status: DISCONTINUED | OUTPATIENT
Start: 2025-04-08 | End: 2025-04-08

## 2025-04-08 RX ORDER — PROPOFOL 10 MG/ML
INJECTION, EMULSION INTRAVENOUS AS NEEDED
Status: DISCONTINUED | OUTPATIENT
Start: 2025-04-08 | End: 2025-04-08

## 2025-04-08 RX ORDER — FENTANYL CITRATE 50 UG/ML
25 INJECTION, SOLUTION INTRAMUSCULAR; INTRAVENOUS EVERY 5 MIN PRN
Status: DISCONTINUED | OUTPATIENT
Start: 2025-04-08 | End: 2025-04-08 | Stop reason: HOSPADM

## 2025-04-08 RX ORDER — DEXAMETHASONE SODIUM PHOSPHATE 10 MG/ML
INJECTION INTRAMUSCULAR; INTRAVENOUS AS NEEDED
Status: DISCONTINUED | OUTPATIENT
Start: 2025-04-08 | End: 2025-04-08

## 2025-04-08 RX ORDER — CEFAZOLIN SODIUM 2 G/100ML
2 INJECTION, SOLUTION INTRAVENOUS ONCE
Status: COMPLETED | OUTPATIENT
Start: 2025-04-08 | End: 2025-04-08

## 2025-04-08 RX ORDER — NAPROXEN SODIUM 220 MG/1
81 TABLET, FILM COATED ORAL 2 TIMES DAILY
Qty: 84 TABLET | Refills: 0 | Status: SHIPPED | OUTPATIENT
Start: 2025-04-08 | End: 2025-05-20

## 2025-04-08 RX ORDER — ONDANSETRON HYDROCHLORIDE 2 MG/ML
4 INJECTION, SOLUTION INTRAVENOUS EVERY 8 HOURS PRN
Status: DISCONTINUED | OUTPATIENT
Start: 2025-04-08 | End: 2025-04-08 | Stop reason: HOSPADM

## 2025-04-08 RX ORDER — FENTANYL CITRATE 50 UG/ML
INJECTION, SOLUTION INTRAMUSCULAR; INTRAVENOUS AS NEEDED
Status: DISCONTINUED | OUTPATIENT
Start: 2025-04-08 | End: 2025-04-08

## 2025-04-08 RX ORDER — LIDOCAINE HYDROCHLORIDE 10 MG/ML
INJECTION, SOLUTION EPIDURAL; INFILTRATION; INTRACAUDAL; PERINEURAL AS NEEDED
Status: DISCONTINUED | OUTPATIENT
Start: 2025-04-08 | End: 2025-04-08

## 2025-04-08 RX ORDER — ASPIRIN 81 MG/1
81 TABLET ORAL 2 TIMES DAILY
Status: DISCONTINUED | OUTPATIENT
Start: 2025-04-08 | End: 2025-04-08 | Stop reason: HOSPADM

## 2025-04-08 RX ADMIN — MIDAZOLAM 2 MG: 1 INJECTION INTRAMUSCULAR; INTRAVENOUS at 07:16

## 2025-04-08 RX ADMIN — FENTANYL CITRATE 50 MCG: 50 INJECTION INTRAMUSCULAR; INTRAVENOUS at 07:39

## 2025-04-08 RX ADMIN — TRANEXAMIC ACID 1000 MG: 100 INJECTION INTRAVENOUS at 09:16

## 2025-04-08 RX ADMIN — FENTANYL CITRATE 50 MCG: 50 INJECTION INTRAMUSCULAR; INTRAVENOUS at 07:29

## 2025-04-08 RX ADMIN — Medication 100 MCG: at 09:13

## 2025-04-08 RX ADMIN — TRANEXAMIC ACID 1000 MG: 100 INJECTION INTRAVENOUS at 07:35

## 2025-04-08 RX ADMIN — SUGAMMADEX 100 MG: 100 INJECTION, SOLUTION INTRAVENOUS at 09:17

## 2025-04-08 RX ADMIN — FENTANYL CITRATE 50 MCG: 50 INJECTION, SOLUTION INTRAMUSCULAR; INTRAVENOUS at 07:16

## 2025-04-08 RX ADMIN — PHENYLEPHRINE-NACL IV SOLUTION 10 MG/250ML-0.9% 0.2 MCG/KG/MIN: 10-0.9/25 SOLUTION at 07:45

## 2025-04-08 RX ADMIN — ROCURONIUM BROMIDE 50 MG: 10 INJECTION, SOLUTION INTRAVENOUS at 07:29

## 2025-04-08 RX ADMIN — KETOROLAC TROMETHAMINE 15 MG: 30 INJECTION, SOLUTION INTRAMUSCULAR at 09:33

## 2025-04-08 RX ADMIN — BUPIVACAINE HYDROCHLORIDE 20 ML: 5 INJECTION, SOLUTION PERINEURAL at 07:17

## 2025-04-08 RX ADMIN — SODIUM CHLORIDE, POTASSIUM CHLORIDE, SODIUM LACTATE AND CALCIUM CHLORIDE: 600; 310; 30; 20 INJECTION, SOLUTION INTRAVENOUS at 07:25

## 2025-04-08 RX ADMIN — Medication 100 MCG: at 08:14

## 2025-04-08 RX ADMIN — POVIDONE-IODINE 1 APPLICATION: 5 SOLUTION TOPICAL at 06:02

## 2025-04-08 RX ADMIN — CEFAZOLIN SODIUM 2 G: 2 INJECTION, SOLUTION INTRAVENOUS at 07:36

## 2025-04-08 RX ADMIN — Medication 21 PERCENT: at 10:30

## 2025-04-08 RX ADMIN — FENTANYL CITRATE 25 MCG: 50 INJECTION INTRAMUSCULAR; INTRAVENOUS at 09:21

## 2025-04-08 RX ADMIN — Medication 100 MCG: at 07:52

## 2025-04-08 RX ADMIN — FENTANYL CITRATE 50 MCG: 50 INJECTION INTRAMUSCULAR; INTRAVENOUS at 09:24

## 2025-04-08 RX ADMIN — PROPOFOL 50 MG: 10 INJECTION, EMULSION INTRAVENOUS at 07:39

## 2025-04-08 RX ADMIN — PROPOFOL 30 MG: 10 INJECTION, EMULSION INTRAVENOUS at 08:26

## 2025-04-08 RX ADMIN — ROCURONIUM BROMIDE 20 MG: 10 INJECTION, SOLUTION INTRAVENOUS at 08:44

## 2025-04-08 RX ADMIN — Medication 100 MCG: at 07:45

## 2025-04-08 RX ADMIN — FENTANYL CITRATE 50 MCG: 50 INJECTION INTRAMUSCULAR; INTRAVENOUS at 09:43

## 2025-04-08 RX ADMIN — FENTANYL CITRATE 25 MCG: 50 INJECTION INTRAMUSCULAR; INTRAVENOUS at 08:26

## 2025-04-08 RX ADMIN — BUPIVACAINE HYDROCHLORIDE 10 ML: 2.5 INJECTION, SOLUTION INFILTRATION; PERINEURAL at 07:19

## 2025-04-08 RX ADMIN — DEXAMETHASONE SODIUM PHOSPHATE 8 MG: 4 INJECTION INTRA-ARTICULAR; INTRALESIONAL; INTRAMUSCULAR; INTRAVENOUS; SOFT TISSUE at 07:36

## 2025-04-08 RX ADMIN — SUGAMMADEX 100 MG: 100 INJECTION, SOLUTION INTRAVENOUS at 09:20

## 2025-04-08 RX ADMIN — Medication 100 MCG: at 07:50

## 2025-04-08 RX ADMIN — PROPOFOL 120 MG: 10 INJECTION, EMULSION INTRAVENOUS at 07:29

## 2025-04-08 RX ADMIN — DEXAMETHASONE SODIUM PHOSPHATE 10 MG: 10 INJECTION, SOLUTION INTRAMUSCULAR; INTRAVENOUS at 07:17

## 2025-04-08 RX ADMIN — ONDANSETRON 4 MG: 2 INJECTION, SOLUTION INTRAMUSCULAR; INTRAVENOUS at 09:13

## 2025-04-08 RX ADMIN — FENTANYL CITRATE 50 MCG: 50 INJECTION INTRAMUSCULAR; INTRAVENOUS at 08:54

## 2025-04-08 RX ADMIN — LIDOCAINE HYDROCHLORIDE 5 ML: 10 INJECTION, SOLUTION EPIDURAL; INFILTRATION; INTRACAUDAL; PERINEURAL at 07:29

## 2025-04-08 ASSESSMENT — COGNITIVE AND FUNCTIONAL STATUS - GENERAL: MOBILITY SCORE: 24

## 2025-04-08 ASSESSMENT — PAIN SCALES - GENERAL
PAINLEVEL_OUTOF10: 0 - NO PAIN

## 2025-04-08 ASSESSMENT — COLUMBIA-SUICIDE SEVERITY RATING SCALE - C-SSRS
1. IN THE PAST MONTH, HAVE YOU WISHED YOU WERE DEAD OR WISHED YOU COULD GO TO SLEEP AND NOT WAKE UP?: NO
2. HAVE YOU ACTUALLY HAD ANY THOUGHTS OF KILLING YOURSELF?: NO
6. HAVE YOU EVER DONE ANYTHING, STARTED TO DO ANYTHING, OR PREPARED TO DO ANYTHING TO END YOUR LIFE?: NO

## 2025-04-08 ASSESSMENT — PAIN - FUNCTIONAL ASSESSMENT
PAIN_FUNCTIONAL_ASSESSMENT: 0-10

## 2025-04-08 ASSESSMENT — ACTIVITIES OF DAILY LIVING (ADL): ADL_ASSISTANCE: INDEPENDENT

## 2025-04-08 NOTE — HH CARE COORDINATION
Home Care received a Referral for Physical Therapy and Occupational Therapy. We have processed the referral for a Start of Care on 4/9/25.  .     If you have any questions or concerns, please feel free to contact us at [phone]. Follow the prompts, enter your five digit zip code, and you will be directed to your care team on C[facility] 1.

## 2025-04-08 NOTE — OP NOTE
Operative Note    PREOPERATIVE DIAGNOSIS: Left knee Osteoarthritis    POSTOPERATIVE DIAGNOSIS: Left knee osteoarthritis    PROCEDURE:   Left total Knee Arthroplasty, Robotic-Assisted     Surgeon:  Corie Barclay MD     First Assist:  SA Kim  Isabel,     Anesthesia Staff:  Anesthesiologist: Mejia Rodriguez MD  CRNA: Margie Macedo APRN-CRNA  SRNA: Marisol Gustabo  Frontline Breaker: LUDWIG Powers    Anesthesia Type: Spinal     Specimens:  No specimens collected    Estimated Blood Loss:  100 mL    Implants:  Implant Name Type Inv. Item Serial No.  Lot No. LRB No. Used Action   COMPONENT, CR FEMORAL, P4, BEADED W/PA, LEFT - [id number] Joint COMPONENT, CR FEMORAL, P4, BEADED W/PA, LEFT  [employer] [id number] Left 1 Implanted   BASEPLATE, TRIATHLON TRITANIUM, SIZE 5, 49MM - [id number] Joint BASEPLATE, TRIATHLON TRITANIUM, SIZE 5, 49MM  [employer] [id number] Left 1 Implanted   10MM TRIATHLON CS TIBIAL INSERT - X3    [employer] [id number] Left 1 Implanted        Complications: None    Findings: Starting 4 degree flexion contracture.  Overall valgus alignment unloaded.  5 degree varus deformity noted.    Clinical History:  The patient presents with severe osteoarthritis of the knee.  The patient has failed conservative management including activity modification, anti-inflammatory medications, and injections. All options were discussed in detail with the patient and the patient has decided that they would like to proceed with total knee replacement after informed consent was obtained in the office and on the day of surgery.  Risks, benefits, alternatives of surgery were explained at length to the patient.  Risks included but are not limited to bleeding, infection, need for re-operation, failure of the operation to provide symptomatic relief, need for multiple revision surgeries in the setting of bleeding, wear, infection,  instability, stiffness, meaning loss of flexion and/or a loss of extension requiring closed and/or open manipulation and/or revision, symptomatic or asymptomatic leg-length discrepancy, damage to major neurovascular structures, bone fracture requiring fracture fixation and/or revision, venous thromboembolic disease, complication of regional and/or general anesthesia, as well as death. The patient understood these risks, understood the proposed benefits, and, after all questions were thoroughly answered, informed consent was obtained by me.    Discussed the use of augmentative technology in the form of robotic-assistance.  This may necessitate the use of additional incisions as well as pins that could induce complications such as infection, fracture, pain, wound healing issues among other potential complications. The patient verbalizes understanding of this and is in agreement to proceed with the use of this assistive technology.    Also discussed the rationale for selectively resurfacing the patella.  If the cartilage is satisfactory in appearance at the time of surgery and the patella tracks appropriately, the decision may be made to selectively resurface the patella.  If any concern with patellofemoral compartment integrity, the decision would be made to resurface the patella.  The patient is understanding of this and agreeable with either decision    Discussed the potential use of press-fit components for total knee replacement.  Discussed that press-fit components can have complications which could include increased postsurgical pain, rate of aseptic loosening and bone loss if revision is required.  Patient demonstrates understanding of these specific risks and is agreeable to the potential consideration proximal with component usage.      Description of procedure:  Informed consent was obtained. The operative site was marked. The patient was transferred to the operating room. A combined spinal epidural  anesthetic and saphenous nerve block were performed. The patient received preoperative antibiotics and tranexamic acid.     The operative leg was scrubbed and prepped and draped in standard sterile fashion.  A surgical time out was performed confirming the operative site. Tourniquet use was deferred.     A longitudinal incision approximately 15 cm in length was made centered over the patella. Soft tissue was dissected sharply down to the extensor mechanism. A medial parapatellar arthrotomy was performed to expose the knee joint. Soft tissue was elevated off the proximal medial tibia for exposure. Fat pad was excised. The patella was dislocated laterally.     Arrays and checkpoints were placed (intra-incisional pins on femur, intra-incisional pins on tibia). Bantam registration was performed. Registration of the bony surfaces was then performed. Preliminary balancing and appropriate adjustments were performed.     Using the robotic arm assistance, the posterior femoral and proximal tibial resections were performed with care to protect the surrounding ligaments. Good bony resections were noted.    A laminar  was then used to access the back of the knee to remove all remaining bony tissues as well as posterior osteophytes and remaining menisci.  Once this was done, both medially and laterally, the flexion gap was checked and found to be appropriately balanced as planned. New balancing of the extension gap was then performed with appropriate adjustments made.   Using the robotic arm assistance, the remaining femoral resections were performed with care to protect the surrounding ligaments. Good bony resections were noted.     The trial femoral and tibial components were then placed. The knee was taken through range of motion and found to have excellent stability.  There was full extension without hyperextension.  There was good balance to varus/valgus to full extension, mid flexion, and full flexion.  The  "flexion/extension gaps were well balanced and equal in extension and flexion gaps.  Femoral and tibial rotational match was confirmed.  All measurements were verified using the robotic navigation system.     The knee was placed in full extension.  The patella was exposed.     Patellar cartilage was found to be well-preserved. Patellar tracking was checked and was found to be excellent with a \"no-thumbs\" technique, and centered in the trochlear groove throughout range of motion. The decision was made to proceed without patellar resurfacing. The lateral patellar facet was exposed and a partial lateral facetectomy was performed. Patellar tracking was again checked and was found to be excellent with a \"no-thumbs\" technique, and centered in the trochlear groove throughout range of motion.    Next the femoral lug holes were drilled and the tibial trial was marked. The femoral trial was removed and the tibia was exposed anteriorly. The tibia was sized and a trial was placed.  Remaining osteophytes of the medial tibial plateau were removed. Given excellent bone quality of the knee, selection for press-fit components was made. The tibia was then prepared in standard fashion and the trial removed. The knee was copiously irrigated with normal saline.     The press-fit tibial component was impacted into place, taking care to ensure planar insertion. The femoral component was then impacted into place. A trial polyethylene insert was then inserted . The patella was reduced.  Hemostasis was obtained with electrocautery. The knee was then taken through a range of motion and I was happy with the balance on the medial and lateral side of the knee through an arc of motion as well as the kinematic tracking. The PCL was recessed slightly from its femoral insertion to optimize femoral rollback.  Final measurements were verified using the robotic navigation system. The knee was taken through final range of motion to test for stability and " patellar tracking.     The trial polyethylene was removed. The locking mechanism was irrigated removing all debris. Excess cement was removed. A final 10 mm condylar-stabilizing polyethylene was put in place and locked into place. The locking mechanism was tested and was intact. The knee was taken through final range of motion to test for stability and patellar tracking. The PCL was recessed slightly from its femoral insertion to optimize femoral rollback.     All arrays and checkpoints were then removed.  The knee was irrigated with dilute chlorhexidine solution and saline pulsatile lavage. The periarticular soft tissues were injected with a standard total joint cocktail for perioperative pain management. Hemostasis was confirmed. The knee was once again taken through range of motion, found to have excellent stability, with full extension without hyperextension.  There was good balance of flexion-extension gaps.  There was a good varus/valgus balance in full extension, mid flexion, and full flexion.     At this point we began our closure. Several #2 ethibonds followed by Zero Vicryls and then #1 stratafix were used for closure of the arthrotomy. Subcutaneous tissue was closed in layers with 0 Vicryl followed by 3-0 Vicryl and skin was closed with 3-0 stratafix and Dermabond. Sterile dressings including Mepilex and Fernandez Ledesma Dressing were applied. At the end of the case all needle and instrument counts were correct. There were no complications. The patient was transferred to the bed and then recovery room in stable condition. A surgical debrief was performed at the end of the procedure.    Post-operative Plan:  The patient will be weight-bearing as tolerated. They will receive perioperative antibiotics and be started on aspirin for DVT prophylaxis with mechanical compression devices. They will be discharged from the hospital when pain is controlled and they have met all PT goals.    Attestation Statement:  I was  present for and performed all critical portions of the procedure.      Corie Barclay MD    This note was transcribed with dictation software.  Please excuse any typographical errors, program misunderstandings leading to inadvertent insertions or deletions of inappropriate wording, pronoun errors and other unintentional transcription errors not noticed on proof-reading.

## 2025-04-08 NOTE — PERIOPERATIVE NURSING NOTE
Pt did well in recovery. Pt tolerates po well. No c/o pain or ponv. Pt with equal movement throughout. Iceman on. Good pulses and perfusion. Pt sitting up drinking coffee. Pt ready for rapid recovery phase 2. X-ray complete.

## 2025-04-08 NOTE — ANESTHESIA PREPROCEDURE EVALUATION
Patient: Montana Gagnon    Procedure Information       Date/Time: 04/08/25 0700    Procedure: ARTHROPLASTY, KNEE, TOTAL, USING ROBOT-ASSISTED NAVIGATION ([place],Tiemann thin bent Hohmann retractors, 2 [place] Triathlon, PS and TS on hand) ** Rapid Recovery ** (Left)    Location: [facility] / [facility]    Surgeons: Corie Barclay MD            Relevant Problems   Cardiac   (+) Coronary atherosclerosis due to severely calcified coronary lesion   (+) Essential hypertension, benign   (+) Hyperlipidemia   (+) Moderate aortic stenosis by prior echocardiogram      Pulmonary   (+) PETERSON (dyspnea on exertion)      Musculoskeletal   (+) Unilateral primary osteoarthritis, left knee     Echo 2024:  CONCLUSIONS:   1. Left ventricular systolic function is low normal with a 50-55% estimated ejection fraction.   2. There is mild asymmetric left ventricular hypertrophy.   3. Moderate aortic valve stenosis.   4. There is moderate aortic valve cusp calcification.   5. Mild aortic valve regurgitation.    Clinical information reviewed:   Tobacco  Allergies  Meds   Med Hx  Surg Hx   Fam Hx          NPO Detail:  NPO/Void Status  Date of Last Liquid: 04/07/25  Time of Last Liquid: 1900  Date of Last Solid: 04/07/25  Time of Last Solid: 1900  Time of Last Void: 0430         Physical Exam    Airway  Mallampati: II  TM distance: >3 FB  Neck ROM: full     Cardiovascular    Dental    Pulmonary    Abdominal            Anesthesia Plan    History of general anesthesia?: yes  History of complications of general anesthesia?: no    ASA 3     regional and spinal     intravenous induction   Anesthetic plan and risks discussed with patient.    Plan discussed with CRNA.

## 2025-04-08 NOTE — BRIEF OP NOTE
Date: 2025  OR Location: [facility]    Name: Montana Gagnon, : 1945, Age: 80 y.o., MRN: [id number], Sex: male    Diagnosis  Pre-op Diagnosis      * Unilateral primary osteoarthritis, left knee [M17.12] Post-op Diagnosis     * Unilateral primary osteoarthritis, left knee [M17.12]     Procedures  ARTHROPLASTY, KNEE, TOTAL, USING ROBOT-ASSISTED NAVIGATION ([place],Tiemann thin bent Hohmann retractors, 2 [place] Triathlon, PS and TS on hand) ** Rapid Recovery **  30104 - DE ARTHRP KNE CONDYLE&PLATU MEDIAL&LAT COMPARTMENTS      Surgeons      * Corie Barclay - Primary    Resident/Fellow/Other Assistant:  Surgeons and Role:  * No surgeons found with a matching role *    Staff:   Circulator: Elle  Scrub Person: Isabel Brunnerub Person: Kim Brunnerub Person: Vivienne    Anesthesia Staff: Anesthesiologist: Mejia Rodriguez MD  CRNA: TRINIDAD Villanueva-ASHA  SRNA: Marisol Montejo  Frontline Breaker: LUDWIG Powers    Procedure Summary  Anesthesia: Regional, Spinal  ASA: III  Estimated Blood Loss: 100 mL  Intra-op Medications:   Administrations occurring from 0700 to 1005 on 25:   Medication Name Total Dose   bupivacaine (Marcaine) injection 0.25 % 10 mL   bupivacaine (Marcaine) injection 0.5 % 20 mL   dexAMETHasone (Decadron) injection 4 mg/mL 8 mg   dexAMETHasone PF 10 mg/mL 10 mg   fentaNYL (Sublimaze) injection 50 mcg/mL 300 mcg   ketorolac (Toradol) injection 30 mg 15 mg   LR infusion Cannot be calculated   lidocaine PF (Xylocaine-MPF) local injection 1 % 5 mL   ondansetron (Zofran) 2 mg/mL injection 4 mg   phenylephrine (Cipriano-Synephrine) 10 mg in sodium chloride 0.9% 250 mL (0.04 mg/mL) infusion (premix) 0.64 mg   phenylephrine 100 mcg/mL syringe 10 mL (prefilled) 500 mcg   propofol (Diprivan) injection 10 mg/mL 200 mg   rocuronium (ZeMuron) 50 mg/5 mL injection 70 mg   sugammadex (Bridion) 200 mg/2 mL injection 200 mg   tranexamic acid (Cyklokapron) injection 2,000 mg   ceFAZolin (Ancef) 2 g in  dextrose (iso)  mL 2 g   fentaNYL PF (Sublimaze) injection 50 mcg 50 mcg   midazolam (Versed) injection 2 mg 2 mg              Anesthesia Record               Intraprocedure I/O Totals          Intake    Tranexamic Acid 0.00 mL    The total shown is the total volume documented since Anesthesia Start was filed.    Phenylephrine Drip 0.00 mL    The total shown is the total volume documented since Anesthesia Start was filed.    ceFAZolin (Ancef) 2 g in dextrose (iso)  mL 100.00 mL    Total Intake 100 mL       Output    Est. Blood Loss 100 mL    Total Output 100 mL       Net    Net Volume 0 mL          Specimen: No specimens collected               Findings: See operative record    Complications:  None; patient tolerated the procedure well.     Disposition: PACU - hemodynamically stable.  Condition: stable  Specimens Collected: No specimens collected  Attending Attestation: I was present and scrubbed for the entire procedure.    Corie Barclay  Phone Number: [phone]

## 2025-04-08 NOTE — NURSING NOTE
1025: patient admitted to phase II. Patient oriented to room and surroundings. Call button/remote within reach. Patient encouraged to order food when ready. RR team notified of patient in phase II. Patient was done under general anesthesia for knee replacement d/t previous back surgeries. Patient has little/no pain. No n/v. Moving BLE's strongly. Wife at bedside.    1200: patient comfortable. Has eaten and denies n/v.    1223: PT, Kierra, at bedside.    1245: patient voids 400ml    1300: patient worked with, and passed, PT. Pharmacy notified of patient ready for meds.    1330: pharmacy at bedside.    1400: patient , wife and daughter given discharge instructions. AVS shown to all and family was able to take pictures of AVS d/t printer not working and unable to provide hard copy of AVS. All instructed to view AVS in [facility]. All state understanding and have had all questions answered. VSS. PIV dc'd.    1410: patient accompanied to car via wheelchair.

## 2025-04-08 NOTE — ANESTHESIA POSTPROCEDURE EVALUATION
Patient: Montana Gagnon    Procedure Summary       Date: 04/08/25 Room / Location: [facility]    Anesthesia Start: 0725 Anesthesia Stop: 0949    Procedure: ARTHROPLASTY, KNEE, TOTAL, USING ROBOT-ASSISTED NAVIGATION ([place],Tiemann thin bent Hohmann retractors, 2 [place]agen[place]ks Triathlon, PS and TS on hand) ** Rapid Recovery ** (Left) Diagnosis:       Unilateral primary osteoarthritis, left knee      (Unilateral primary osteoarthritis, left knee [M17.12])    Surgeons: Corie Barclay MD Responsible Provider: Mejia Rodriguez MD    Anesthesia Type: regional, spinal ASA Status: 3            Anesthesia Type: regional, spinal    Vitals Value Taken Time   /74 04/08/25 0944   Temp 36.1 °C (97 °F) 04/08/25 0944   Pulse 78 04/08/25 0944   Resp 18 04/08/25 0944   SpO2 98 04/08/25 1001       Anesthesia Post Evaluation    Patient location during evaluation: PACU  Patient participation: complete - patient participated  Level of consciousness: awake  Pain management: adequate  Airway patency: patent  Cardiovascular status: acceptable  Respiratory status: acceptable  Hydration status: acceptable  Postoperative Nausea and Vomiting: none        There were no known notable events for this encounter.

## 2025-04-08 NOTE — ANESTHESIA PROCEDURE NOTES
Airway  Date/Time: 4/8/2025 7:34 AM  Urgency: elective    Airway not difficult    Staffing  Performed: CRNA   Authorized by: Mejia Rodriguez MD    Performed by: DANIELLE Villanueva  Patient location during procedure: OR    Indications and Patient Condition  Indications for airway management: anesthesia  Spontaneous ventilation: present  Sedation level: deep  Preoxygenated: yes  Patient position: sniffing  Mask difficulty assessment: 1 - vent by mask    Final Airway Details  Final airway type: endotracheal airway      Successful airway: ETT  Cuffed: yes   Successful intubation technique: direct laryngoscopy  Facilitating devices/methods: intubating stylet  Endotracheal tube insertion site: oral  Blade: Anitra  Blade size: #4  ETT size (mm): 7.5  Cormack-Lehane Classification: grade I - full view of glottis  Placement verified by: chest auscultation and capnometry   Cuff volume (mL): 7  Measured from: lips  ETT to lips (cm): 22  Number of attempts at approach: 1

## 2025-04-08 NOTE — DISCHARGE INSTRUCTIONS
Total/Partial Knee Arthroplasty   Postoperative Instructions    CONTACT INFORMATION  Corie Barclay MD  Joint Replacement Surgeon   Vicky Mullins      [phone]     Flor Wilson MBA, BSN, RN-BC  Ortho Coordinator [facility]  [phone]    Fish Wagner RN, BSN  Ortho Coordinator [facility]  [phone]    Elisa Hill BSN-BC  Ortho Nurse Navigator  [phone]    DRIVING AFTER SURGERY   Please discuss post-surgical, long-distance travel with your surgeon. You may not drive until you are off narcotics and cleared by your surgical team.     WOUND CARE   A padded wrap (ace wrap) was placed on your knee on the day of surgery. You may remove this on the 2nd day after surgery.     A waterproof dressing was placed over your surgical site. You may shower over this dressing after surgery and pat it dry. Please do not scrub, soak, or submerge your surgical site for at least three weeks after surgery to allow your incision to heal. Avoid using creams and ointments around your surgical site while it is healing.    Your dressing will be removed on post-operative day 7 by your physical therapist. You may leave the incision open to air after the bandage has been removed. You may have a second dressing/incision on your leg from pin sites if robotic-assistance was used in your surgery. Please do not submerge your incision in a hot tub/pool/lake/etc. until cleared by your surgeon. Please contact the office if there are any concerns with your wound.     Pain, swelling, and bruising are normal after total knee replacement surgery. You may alleviate these symptoms by following the post-operative pain regimen that was prescribed, icing your surgical site multiple times a day, and elevating your extremity throughout the day.     ACTIVITY AND PRECAUTIONS  Please follow the post-operative activity precautions that were described to you by your surgical team and physical therapists (if  applicable).    Weightbearing restriction: weightbearing as tolerated.     DISCHARGE MEDICATIONS  Pain  Please take the pain medications that were prescribed to you according to the prescribed schedule. You may not operate a motor vehicle while taking narcotic medications (e.g. Oxycodone, Tramadol).     Please take Tylenol and NSAIDs (if you are able) on a schedule as discussed in clinic. Please take the prescribed Pantoprazole to protect your stomach from ulceration after surgery while taking NSAIDs.     Please wean off the narcotic pain medications as soon as you are able.     Blood-Thinners  Please take the blood thinning medications that were prescribed according to the instructions from your surgeon. These are typically continued for 6 weeks postoperatively. This schedule may differ if you were already on blood-thinning medication prior to your surgery.     Nausea  You may feel nauseous after surgery due to the anesthetics or pain medications you are taking. You may take anti-nausea medication (e.g. Ondansetron) to help with these symptoms.     Stool Softeners   Please take the stool softeners that were prescribed at discharge (e.g. Colace, Senna) while you are on narcotic pain medications to minimize your risk of constipation.    Home Medications   You may restart your home medications at time of discharge according to your discharge instructions.    PHYSICAL THERAPY AND OCCUPATIONAL THERAPY   In-home physical therapy and occupational therapy will start within a few days of your discharge to home. You will transition to outpatient physical therapy around 2-3 weeks after your surgery.     FOLLOW-UP  You will see your surgical team around 2 weeks after surgery for a wound check (unless otherwise arranged) and between 4-6 weeks after surgery for X-rays and clinical evaluation.    CALL YOUR SURGEON IF YOU HAVE:   ° Drainage that is not contained by your surgical dressing.  ° Redness, pain or swelling in your calf.    ° Severe pain that is not controlled by the pain regimen prescribed.   ° Fever >101 Fahrenheit presents for at least 48 hours or other signs of infection (wound drainage, redness around your surgical incision, increased joint pain)  ° Go to the emergency department or call 911 if you experience chest pain, shortness of breath or other emergent symptoms. Do not call your surgeon first.     ANTIBIOTIC PROPHYLAXIS AFTER JOINT REPLACEMENT SURGERY   Although it is a rare occurrence, an artificial joint can become infected by the bloodstream carrying infection from another part of the body to the replaced joint.  Therefore, it is important that any bacterial infection be treated promptly. If you are unsure of whether you need to take antibiotics, please call the office before taking any medication.  We advise that you take antibiotics prior to the following invasive procedures for a lifetime. Please wait at least 3-6 months after joint replacement surgery before undergoing dental work or cleaning.    Please take antibiotics one hour before any of the following procedures:  ° Routine dental cleaning or dental procedure  ° Root canal  ° Podiatry procedures that involve cutting into the skin  ° Dermatologic procedures that involve cutting into the skin.  (Not required for laser or freezing of skin)  ° Invasive procedures regarding the urinary tract     Antibiotics are not required for the following procedures:   ° Manicures/Pedicures  ° Colonoscopy/Endoscopy/Sigmoidoscopy  ° Routine gynecologic exams/procedures/PAP smears, D&C´s  ° Cataract/laser eye surgery  ° Injection or blood work  ° Routine colds and flu and minor cuts and bruises    You may have a flu shot at any time following your surgery.

## 2025-04-08 NOTE — PROGRESS NOTES
Met with Patient and Care Partner at bedside- Patient is s/p Left Total Knee Replacement with Dr. Corie Barclay.  Discussion with patient included education on the following topics: TJR Education: Wound Care (Bandage Care & Removal, Personal Hygiene & Infection Prevention), Post-Op Activity (Home PT Regimen, Movement Precautions, Assistive Equipment & 1-2hr Movement), Post-Op Precautions (Falls, Blood Clots & Constipation), Cold-Therapy (Ice vs. Cold Therapy Machines) , Methods for Symptom Management (Pain, Nausea, Swelling & Constipation), Importance of Post-op Prescriptions, How to Obtain Medication Refills, When to Resume Driving & Who to Call, Use of [employer] & Staff Contact Information, When to call 9-1-1, and When to call the Surgeon's Office.  Patient Did Complete and Live class prior to surgery.  Patient is able to verbalize understanding of class content/post-operative discussion.  Contact information was provided to patient for support and assistance during the post-operative period.    At the time of this discussion, the patient's plan includes:    Discharge Date/Disposition:  Home, Today with, Home Care Services  Discharge Needs: No Equipment Needs Identified  Medications/Pharmacy: [email or web address] service utilized for discharge prescriptions through [facility]

## 2025-04-08 NOTE — ANESTHESIA PROCEDURE NOTES
Peripheral Block    Patient location during procedure: pre-op  Start time: 4/8/2025 7:16 AM  End time: 4/8/2025 7:18 AM  Reason for block: at surgeon's request and post-op pain management  Staffing  Performed: attending   Authorized by: Mejia Rodriguez MD    Performed by: Fish Kimball MD  Preanesthetic Checklist  Completed: patient identified, IV checked, site marked, risks and benefits discussed, surgical consent, monitors and equipment checked, pre-op evaluation and timeout performed   Timeout performed at: 4/8/2025 7:14 AM  Peripheral Block  Patient position: laying flat  Prep: alcohol swabs, ChloraPrep and site prepped and draped  Patient monitoring: heart rate, cardiac monitor and continuous pulse ox  Block type: adductor canal  Laterality: left  Injection technique: single-shot  Guidance: nerve stimulator and ultrasound guided  Needle  Needle gauge: 21 G  Needle length: 10 cm  Needle localization: ultrasound guidance     image stored in chart  Needle insertion depth: 8 cm  Test dose: negative  Assessment  Injection assessment: negative aspiration for heme, no paresthesia on injection, local visualized surrounding nerve on ultrasound and incremental injection  Paresthesia pain: immediately resolved  Heart rate change: no  Slow fractionated injection: yes  Additional Notes  Dexamethasone 10mg added to local anesthetic.

## 2025-04-08 NOTE — CARE PLAN
80 yr old male admitted RR following left knee replacement with Dr. Barclay.  Plan is home today with [facility] PT.  SOC confirmed for 4/9/25.  Scheduling will call to confirm the time.  Spouse to transport and assist with care.

## 2025-04-08 NOTE — ANESTHESIA PROCEDURE NOTES
Peripheral Block    Patient location during procedure: pre-op  Start time: 4/8/2025 7:19 AM  End time: 4/8/2025 7:19 AM  Reason for block: at surgeon's request and post-op pain management  Staffing  Performed: attending   Authorized by: Mejia Rodriguez MD    Performed by: Fish Kimball MD  Preanesthetic Checklist  Completed: patient identified, IV checked, site marked, risks and benefits discussed, surgical consent, monitors and equipment checked, pre-op evaluation and timeout performed   Timeout performed at: 4/8/2025 7:14 AM  Peripheral Block  Patient position: laying flat  Prep: alcohol swabs, ChloraPrep and site prepped and draped  Patient monitoring: heart rate, cardiac monitor and continuous pulse ox  Block type: IPACK (IPACK)  Laterality: left  Injection technique: single-shot  Guidance: nerve stimulator and ultrasound guided  Needle  Needle type: short-bevel   Needle gauge: 21 G  Needle length: 10 cm  Needle localization: anatomical landmarks and ultrasound guidance  Needle insertion depth: 8 cm  Assessment  Injection assessment: negative aspiration for heme, no paresthesia on injection, local visualized surrounding nerve on ultrasound and incremental injection  Paresthesia pain: none  Heart rate change: no  Slow fractionated injection: yes  Additional Notes  Curved transducer, posteromedial approach, IPACK

## 2025-04-08 NOTE — PROGRESS NOTES
Physical Therapy Evaluation & Treatment    Patient Name: Montana Gagnon  MRN: [id number]  Department:   Room: [facility]  Todays Date: 4/8/2025   Time Calculation  Start Time: 1222  Stop Time: 1300  Time Calculation (min): 38 min    Assessment/Plan   PT Assessment  PT Assessment Results: Decreased range of motion, Decreased mobility, Pain, Orthopedic restrictions  Rehab Prognosis: Excellent  Barriers to Discharge Home: No anticipated barriers  Evaluation/Treatment Tolerance: Patient tolerated treatment well  Medical Staff Made Aware: Yes  End of Session Communication: Bedside nurse  Assessment Comment: PT eval low . Pt [facility] to [facility] with deficits in functional mobility and impaired strength. Pt completed all mobility with supervision only to mod I with appropriate device without buckle or LOB. Completed ADLs without assistance. Highly motivated for recovery post op and is expected to progress well towards goals. Pt understanding of knee precautions and maintained them during session. Pt understanding of HEP and completed ther ex during session.. PT recommends [facility] PT with assistance as needed with supervision for continued improvement in mobility, strength, and pain management..    End of Session Patient Position: Up in chair (elevated on LLE, call bell in reach)   IP OR SWING BED PT PLAN  Inpatient or Swing Bed: Inpatient  PT Plan  Treatment/Interventions: Bed mobility, Transfer training, Gait training, Stair training, Strengthening, Endurance training, Range of motion, Therapeutic activity, Home exercise program, Positioning  PT Plan: Ongoing PT  PT Frequency: BID  PT Discharge Recommendations: Low intensity level of continued care  Equipment Recommended upon Discharge: Wheeled walker, Straight cane  PT Recommended Transfer Status: Assistive device, Stand by assist  PT - OK to Discharge: Yes      Subjective     General Visit Information:  General  Reason for Referral: L TKA  Referred By:   Deny  Past Medical History Relevant to Rehab: OA, AVS, CAD, HTN, spinal stenosis, HLD, CAP, fractures, HA, cardiac valve disease, lumbar neuritis, LDD, nerve block, low back surgery, PETERSON  Family/Caregiver Present: Yes  Prior to Session Communication: Bedside nurse  Patient Position Received: Bed, 3 rail up  General Comment: cleared by RN and agreeable to session  Home Living:  Home Living  Type of Home: House  Lives With: Spouse  Home Adaptive Equipment: Walker rolling or standard, Cane  Home Layout: Two level, Stairs to alternate level with rails  Alternate Level Stairs-Number of Steps: 12 steps with railing  Home Access: Stairs to enter without rails  Entrance Stairs-Number of Steps: 2 steps  Home Living Comments: wife t oassist at home;  Prior Level of Function:  Prior Function Per Pt/Caregiver Report  Level of [place]: Independent with ADLs and functional transfers  Receives Help From: Family  ADL Assistance: Independent  Homemaking Assistance: Independent  Ambulatory Assistance: Independent  Vocational: Retired  Prior Function Comments: no falls within last 6 monhts  Precautions:  Precautions  LE Weight Bearing Status: Weight Bearing as Tolerated  Medical Precautions: Fall precautions  Post-Surgical Precautions: Left total knee precautions           Objective   Pain:  Pain Assessment  Pain Assessment: 0-10  0-10 (Numeric) Pain Score: 0 - No pain  Cognition:  Cognition  Overall Cognitive Status: Within Functional Limits  Attention: Within Functional Limits  Memory: Within Funtional Limits  Problem Solving: Within Functional Limits  Numeric Reasoning: Within Functional Limits  Abstract Reasoning: Within Functional Limits  Safety/Judgement: Within Functional Limits  Insight: Within function limits  Impulsive: Within functional limits  Processing Speed: Within funtional limits    General Assessments:  General Observation  General Observation: dressing dry and intact on L knee               Activity  Tolerance  Endurance: Endurance does not limit participation in activity    Sensation  Light Touch: No apparent deficits            Perception  Inattention/Neglect: Appears intact  Initiation: Appears intact  Motor Planning: Appears intact  Perseveration: Not present      Coordination  Movements are Fluid and Coordinated: Yes    Postural Control  Postural Control: Within Functional Limits    Static Sitting Balance  Static Sitting-Balance Support: Feet supported  Static Sitting-Level of Assistance: Independent  Dynamic Sitting Balance  Dynamic Sitting-Balance Support: Feet supported  Dynamic Sitting-Level of Assistance: Independent    Static Standing Balance  Static Standing-Balance Support: Bilateral upper extremity supported  Static Standing-Level of Assistance: Modified independent  Static Standing-Comment/Number of Minutes: with RW  Dynamic Standing Balance  Dynamic Standing-Balance Support: Bilateral upper extremity supported  Dynamic Standing-Level of Assistance: Distant supervision  Dynamic Standing-Comments: with RW  Functional Assessments:  Bed Mobility  Bed Mobility: Yes  Bed Mobility 1  Bed Mobility 1: Supine to sitting, Scooting  Level of Assistance 1: Independent    Transfers  Transfer: Yes  Transfer 1  Technique 1: Sit to stand, Stand to sit  Transfer Device 1: Walker  Transfer Level of Assistance 1: Modified independent  Trials/Comments 1: from bed x3, from chair x2 without buckle or LOB - good hand placement    Ambulation/Gait Training  Ambulation/Gait Training Performed: Yes  Ambulation/Gait Training 1  Surface 1: Level tile  Device 1: Rolling walker  Assistance 1: Modified independent  Quality of Gait 1: Antalgic  Comments/Distance (ft) 1: ambulated 100 feetx2, 20 feetx2, no buckle or LOB, tolerated well with reciprocal stepping    Stairs  Stairs: Yes  Stairs  Rails 1: Right  Curb Step 1: No  Device 1: Railing, Single point cane  Assistance 1: Close supervision  Comment/Number of Steps 1: 3 steps  with cueing for stepping pattern, non reciprocal, no buckle or lOB.  Stairs 2  Rails 2: None (Comment)  Curb Step 2: No  Device 2: Single point cane  Assistance 2: Contact guard, Hand held assistance  Comment/Number of Steps 2: 3 steps, non reciprocal stepping, cueing for cane progression and stepping pattern wit hcane on R side. no buckle or lOB but slower for safety.  Extremity/Trunk Assessments:  RUE   RUE : Within Functional Limits  LUE   LUE: Within Functional Limits  RLE   RLE : Within Functional Limits  LLE   LLE : Exceptions to WFL  AROM LLE (degrees)  LLE AROM Comment: ROM slightly limited due to post op edema and stiffness however flexion atleast >90  Strength LLE  LLE Overall Strength: Greater than or equal to 3/5 as evidenced by functional mobility  Treatments:  Therapeutic Exercise  Therapeutic Exercise Performed: Yes (x10. LLE only)  Therapeutic Exercise Activity 1: ankle pump  Therapeutic Exercise Activity 2: quad set  Therapeutic Exercise Activity 3: glute set  Therapeutic Exercise Activity 4: heel slide  Therapeutic Exercise Activity 5: hip abduction  Therapeutic Exercise Activity 6: SLR  Therapeutic Exercise Activity 7: SAQ    Therapeutic Activity  Therapeutic Activity Performed: Yes  Therapeutic Activity 1: pt stood at commode without buckle or LOB, no assist needed for hand hygiene or cleansing hygiene post void. 400 mL voided. RN notified  Therapeutic Activity 2: pt dressed BLE and BUE without assist. able to stand to adjust clothing without issues  Outcome Measures:  [facility] Basic Mobility  Turning from your back to your side while in a flat bed without using bedrails: None  Moving from lying on your back to sitting on the side of a flat bed without using bedrails: None  Moving to and from bed to chair (including a wheelchair): None  Standing up from a chair using your arms (e.g. wheelchair or bedside chair): None  To walk in hospital room: None  Climbing 3-5 steps with railing: None  Basic  Mobility - Total Score: 24        Education Documentation  Handouts, taught by Kierra Amezquita PT at 4/8/2025  4:16 PM.  Learner: Significant Other, Patient  Readiness: Acceptance  Method: Explanation, Demonstration, Handout  Response: Verbalizes Understanding, Demonstrated Understanding    Precautions, taught by Kierra Amezquita PT at 4/8/2025  4:16 PM.  Learner: Significant Other, Patient  Readiness: Acceptance  Method: Explanation, Demonstration, Handout  Response: Verbalizes Understanding, Demonstrated Understanding    Body Mechanics, taught by Kierra Amezquita PT at 4/8/2025  4:16 PM.  Learner: Significant Other, Patient  Readiness: Acceptance  Method: Explanation, Demonstration, Handout  Response: Verbalizes Understanding, Demonstrated Understanding    Home Exercise Program, taught by Kierra Amezquita PT at 4/8/2025  4:16 PM.  Learner: Significant Other, Patient  Readiness: Acceptance  Method: Explanation, Demonstration, Handout  Response: Verbalizes Understanding, Demonstrated Understanding    Mobility Training, taught by Kierra Amezquita PT at 4/8/2025  4:16 PM.  Learner: Significant Other, Patient  Readiness: Acceptance  Method: Explanation, Demonstration, Handout  Response: Verbalizes Understanding, Demonstrated Understanding    Education Comments  educated on knee precautions: no kneeling, no twisting/ pivoting, not resting with knee flexed but in extension and elevated, ambulate each hour for reduction in stiffness and DVT risk, use of walker for all activity, allow knee flexion with activity, and complete exercises twice a day 3 sets of 10.     Kierra Amezquita, PT, DPT

## 2025-04-08 NOTE — PROGRESS NOTES
Medication Education     Medication education for Montana Gagnon was provided to the patient and family for the following medication(s):  Aspirin  Oxycodone  Tramadol  Tylenol  Mobic  Cefadroxil  Bactroban ointment  Protonix  Zofran  Senna      Medication education provided by a Pharmacist:  Dose, frequency, storage Proper dose, indication, possible ADRs Refilling the medication  How the medication works and benefits of taking it Benefits of taking the medication  Importance of compliance    Identified potential barriers to education:  None    Method(s) of Education:  Verbal Written materials provided and reviewed    An opportunity to ask questions and receive answers was provided.     Assessment of understanding the patient and family:  2= meets goals/outcomes    Additional Notes (if applicable):     M2B delivered.    Jame Carlos, PharmD

## 2025-04-08 NOTE — PROGRESS NOTES
"Orthopedic Postoperative Check     Subjective  Interval History: The patient has mild pain, The patient is awaiting PT, The patient is tolerating a diet, The patient has no nausea or vomiting, and The patient is moving feet and ankles freely. Pain well controlled on current regimen.     Objective    Physical Exam  NAD  Dressing/Incision c/d/i  Firing TA/EHL/GS  SILT L4-S1 grossly  2+ DP  WWP    Vitals/Labs:   Prior Labs:   Lab Results   Component Value Date    WBC 8.5 03/26/2025    HGB 13.4 (L) 03/26/2025    HCT 42.3 03/26/2025    MCV 94 03/26/2025     03/26/2025      No results found for: \"INR\", \"PROTIME\"      Lab Results   Component Value Date    GLUCOSE 89 03/26/2025    CALCIUM 9.6 03/26/2025     03/26/2025    K 3.8 03/26/2025    CO2 30 03/26/2025     03/26/2025    BUN 21 03/26/2025    CREATININE 0.96 03/26/2025      No results found for: \"CKTOTAL\", \"CKMB\", \"CKMBINDEX\", \"TROPONINI\"   Lab Results   Component Value Date    HGBA1C 5.7 (H) 03/26/2025         No results found for: \"CRP\"   No results found for: \"SEDRATE\"      Assessment/Plan  80-year-old male, s/p left total knee arthroplasty, robotic assisted,  POD# 0 doing well.   · Continue Physical Therapy and Mobilize  · Weightbearing as tolerated   · Follow-Up Labs  · DVT Prophylaxis:  Aspirin 81 mg BID starting POD 0, continue for 6 weeks, Sequential compression devices, and SUSAN hose   · Antibiotics: Perioperative cefazolin 2 doses (or until discharge). 7-day course of oral Cefadroxil 500 mg BID.  Mupirocin at discharge.  · Analgesia: wean to orals. Judicious use of opioids.   · Ice to surgical site every 4 hours   · Advance Diet  · IS, Bowel regimen  · Dispo Planning  · Outpatient Follow-Up: Dr. Barclay clinic at 2 weeks postoperatively     Corie Barclay MD   Adult Reconstruction and Joint Replacement      "

## 2025-04-09 ENCOUNTER — HOME CARE VISIT (OUTPATIENT)
Dept: HOME HEALTH SERVICES | Facility: HOME HEALTH | Age: 80
End: 2025-04-09
Payer: MEDICARE

## 2025-04-09 VITALS
HEART RATE: 70 BPM | SYSTOLIC BLOOD PRESSURE: 106 MMHG | OXYGEN SATURATION: 99 % | DIASTOLIC BLOOD PRESSURE: 60 MMHG | TEMPERATURE: 98.1 F

## 2025-04-09 PROCEDURE — G0151 HHCP-SERV OF PT,EA 15 MIN: HCPCS | Mod: HHH

## 2025-04-09 PROCEDURE — 169592 NO-PAY CLAIM PROCEDURE

## 2025-04-09 SDOH — ECONOMIC STABILITY: HOUSING INSECURITY: HOME SAFETY: UH HANDBOOK REVIEWED AND PROVIDED TO PATIENT

## 2025-04-09 ASSESSMENT — ACTIVITIES OF DAILY LIVING (ADL)
AMBULATION ASSISTANCE ON FLAT SURFACES: 1
OASIS_M1830: 03
ENTERING_EXITING_HOME: SUPERVISION

## 2025-04-09 ASSESSMENT — ENCOUNTER SYMPTOMS
HIGHEST PAIN SEVERITY IN PAST 24 HOURS: 6/10
SUBJECTIVE PAIN PROGRESSION: WAXING AND WANING
PAIN: 1
LOWEST PAIN SEVERITY IN PAST 24 HOURS: 5/10
PERSON REPORTING PAIN: PATIENT

## 2025-04-11 ENCOUNTER — HOME CARE VISIT (OUTPATIENT)
Dept: HOME HEALTH SERVICES | Facility: HOME HEALTH | Age: 80
End: 2025-04-11
Payer: MEDICARE

## 2025-04-11 ENCOUNTER — APPOINTMENT (OUTPATIENT)
Dept: ORTHOPEDIC SURGERY | Facility: CLINIC | Age: 80
End: 2025-04-11
Payer: MEDICARE

## 2025-04-11 VITALS
TEMPERATURE: 98.4 F | SYSTOLIC BLOOD PRESSURE: 120 MMHG | HEART RATE: 78 BPM | DIASTOLIC BLOOD PRESSURE: 70 MMHG | OXYGEN SATURATION: 98 %

## 2025-04-11 VITALS
HEART RATE: 76 BPM | DIASTOLIC BLOOD PRESSURE: 84 MMHG | TEMPERATURE: 97.6 F | OXYGEN SATURATION: 96 % | SYSTOLIC BLOOD PRESSURE: 120 MMHG

## 2025-04-11 PROCEDURE — G0151 HHCP-SERV OF PT,EA 15 MIN: HCPCS | Mod: HHH

## 2025-04-11 PROCEDURE — G0152 HHCP-SERV OF OT,EA 15 MIN: HCPCS | Mod: HHH

## 2025-04-11 ASSESSMENT — ACTIVITIES OF DAILY LIVING (ADL)
BATHING_CURRENT_FUNCTION: INDEPENDENT
DRESSING_LB_CURRENT_FUNCTION: INDEPENDENT
AMBULATION ASSISTANCE: STAND BY ASSIST
PHYSICAL_TRANSFERS_DEVICES: FWW
TOILETING: INDEPENDENT
PHYSICAL TRANSFERS ASSESSED: 1
CURRENT_FUNCTION: STAND BY ASSIST
CURRENT_FUNCTION: INDEPENDENT
BATHING ASSESSED: 1
AMBULATION ASSISTANCE ON FLAT SURFACES: 1
TOILETING: 1

## 2025-04-11 ASSESSMENT — ENCOUNTER SYMPTOMS
PERSON REPORTING PAIN: PATIENT
MUSCLE WEAKNESS: 1
HIGHEST PAIN SEVERITY IN PAST 24 HOURS: 6/10
PAIN LOCATION - RELIEVING FACTORS: REST, ICE, PAIN MEDS
PERSON REPORTING PAIN: PATIENT
PAIN LOCATION - PAIN SEVERITY: 0/10
PAIN LOCATION: LEFT KNEE
HIGHEST PAIN SEVERITY IN PAST 24 HOURS: 7/10
LIMITED RANGE OF MOTION: 1
PAIN LOCATION - PAIN QUALITY: ACHE
PAIN: 1
PAIN: 1
LOWEST PAIN SEVERITY IN PAST 24 HOURS: 4/10
LOWEST PAIN SEVERITY IN PAST 24 HOURS: 0/10
PAIN LOCATION - EXACERBATING FACTORS: EXERCISING
SUBJECTIVE PAIN PROGRESSION: GRADUALLY IMPROVING

## 2025-04-11 NOTE — SIGNIFICANT EVENT
Thank you for taking my call today regarding your recent joint replacement surgery with Dr. Corie Barclay.      We discussed that: Home Health Care services (physical and/or occupational therapy) have been initiated Your pain is Controlled on the current regimen Will fluctuate throughout recovery with increased activity You are able to tolerate regular activity and exercises The importance of continued cold therapy throughout recovery The importance of following the prescribed precautions by your surgeon You have not had a bowel movement, and we discussed the importance of a well balanced diet, hydration, and continued use of stool softener/laxative as prescribed The importance of continuing blood thinner as prescribed The importance of wearing compression stockings as prescribed    You indicated that all of your questions have been answered at the time of our call.    Please don't hesitate to reach out if you have any additional questions or concerns.    Flor Wilson MBA, BSN, RN-BC, ONC  Zari Andersen RN  Orthopedic Patient Navigator  [facility]   [phone]

## 2025-04-15 ENCOUNTER — HOME CARE VISIT (OUTPATIENT)
Dept: HOME HEALTH SERVICES | Facility: HOME HEALTH | Age: 80
End: 2025-04-15
Payer: MEDICARE

## 2025-04-15 VITALS — OXYGEN SATURATION: 98 % | SYSTOLIC BLOOD PRESSURE: 106 MMHG | DIASTOLIC BLOOD PRESSURE: 60 MMHG | TEMPERATURE: 97.4 F

## 2025-04-15 PROCEDURE — G0151 HHCP-SERV OF PT,EA 15 MIN: HCPCS | Mod: HHH

## 2025-04-15 ASSESSMENT — ENCOUNTER SYMPTOMS
HIGHEST PAIN SEVERITY IN PAST 24 HOURS: 5/10
LIMITED RANGE OF MOTION: 1
LOWEST PAIN SEVERITY IN PAST 24 HOURS: 2/10
SUBJECTIVE PAIN PROGRESSION: RAPIDLY IMPROVING

## 2025-04-17 ENCOUNTER — HOME CARE VISIT (OUTPATIENT)
Dept: HOME HEALTH SERVICES | Facility: HOME HEALTH | Age: 80
End: 2025-04-17
Payer: MEDICARE

## 2025-04-17 PROCEDURE — G0151 HHCP-SERV OF PT,EA 15 MIN: HCPCS | Mod: HHH

## 2025-04-18 VITALS
HEART RATE: 72 BPM | DIASTOLIC BLOOD PRESSURE: 60 MMHG | TEMPERATURE: 97.6 F | SYSTOLIC BLOOD PRESSURE: 118 MMHG | OXYGEN SATURATION: 98 %

## 2025-04-18 SDOH — ECONOMIC STABILITY: HOUSING INSECURITY: HOME SAFETY: TKA PRECAUTIONS

## 2025-04-18 ASSESSMENT — ENCOUNTER SYMPTOMS
PAIN: 1
LOWEST PAIN SEVERITY IN PAST 24 HOURS: 0/10
HIGHEST PAIN SEVERITY IN PAST 24 HOURS: 3/10
PERSON REPORTING PAIN: PATIENT
SUBJECTIVE PAIN PROGRESSION: RAPIDLY IMPROVING
MUSCLE WEAKNESS: 1
LIMITED RANGE OF MOTION: 1

## 2025-04-18 ASSESSMENT — ACTIVITIES OF DAILY LIVING (ADL)
HOME_HEALTH_OASIS: 00
OASIS_M1830: 00

## 2025-04-18 NOTE — CASE COMMUNICATION
DISCHARGE SUMMARY:    DISCIPLINE: PT  DATE OF DISCIPLINE DISCHARGE: 37648  REASON FOR DISCHARGE: Goals met   EVALUATION OF GOALS: Yes  SUMMARY OF CARE PROVIDED: hep, gait training, rom, strengthening, pain management instruction and incision care  DISCHARGE INSTRUCTIONS GIVEN: Cont home exercises, use ad for fall prevention, follow up with out patient service  SERVICES REMAINING: none  NOMNC OBTAINED: yes

## 2025-04-23 ENCOUNTER — APPOINTMENT (OUTPATIENT)
Dept: ORTHOPEDIC SURGERY | Facility: HOSPITAL | Age: 80
End: 2025-04-23
Payer: MEDICARE

## 2025-04-24 ENCOUNTER — APPOINTMENT (OUTPATIENT)
Dept: ORTHOPEDIC SURGERY | Facility: CLINIC | Age: 80
End: 2025-04-24
Payer: MEDICARE

## 2025-05-02 ENCOUNTER — APPOINTMENT (OUTPATIENT)
Dept: ORTHOPEDIC SURGERY | Facility: CLINIC | Age: 80
End: 2025-05-02
Payer: MEDICARE

## 2025-05-02 DIAGNOSIS — Z96.652 STATUS POST TOTAL LEFT KNEE REPLACEMENT: Primary | ICD-10-CM

## 2025-05-02 PROCEDURE — 99024 POSTOP FOLLOW-UP VISIT: CPT | Performed by: STUDENT IN AN ORGANIZED HEALTH CARE EDUCATION/TRAINING PROGRAM

## 2025-05-02 PROCEDURE — 1123F ACP DISCUSS/DSCN MKR DOCD: CPT | Performed by: STUDENT IN AN ORGANIZED HEALTH CARE EDUCATION/TRAINING PROGRAM

## 2025-05-02 NOTE — PROGRESS NOTES
Corie Barclay MD   Adult Reconstruction and Joint Replacement Surgery  Phone: [phone]     Fax: [phone]       Name: Montana Gagnon  Age: 80 y.o.   : 1945   Date of Visit: 2025    KNEE REPLACEMENT POST-OP VISIT     Chief Complaint:  Left Total Knee Replacement Surgery Follow-Up    History of Present Illness:    The patient is now 3 weeks 3 days status post left Total knee replacement surgery.    The patient is doing outpatient physical therapy and is progressing well.    Denies fevers or drainage from the incision.    Patient is walking with nothing for assistive device.    The patient has no fevers or drainage from the incision.    The patient is currently taking meloxicam for pain.     The patient is currently taking aspirin for DVT prophylaxis.    There are no concerns.    Physical Exam:    The patient is well appearing, alert and oriented to person place and time.    The incision is healing without complication. No drainage or evidence of infection.    Range of motion is excellent and strength is improving. ROM today is 0 to 115 deg.     There is no instability of the joint.    Homans sign is negative.    Neurologic, and vascular examinations are normal.    PROMs   Koos Jr-Knee Injury And Osteoarthritis Outcome Score For Joint Replacement    3/17/2025  2:28 PM EDT - Filed by Patient   Instructions    The following question concerns the amount of joint stiffness you have experienced during the last week in your knee. Stiffness is a sensation of restriction or slowness in the ease with which you move your knee joint.   How severe is your knee stiffness after first wakening in the morning? Mild   What amount of knee pain have you experienced the last week during the following activities?   Twisting/pivoting on your knee Moderate   Straightening knee fully None   Going up or down stairs None   Standing upright None   The following questions concern your physical function. By this we mean  your ability to move around and to look after yourself. For each of the following activities please indicate the degree of difficulty you have experienced in the last week due to your knee.   Rising from sitting Moderate   Bending to floor/ an object Mild   Koos Jr Scoring (range: 0 - 100) 70.7     [facility] [place] 12 Item Health Survey (Vr-12)    3/17/2025  2:50 PM EDT - Filed by Patient   In general, would you say your health is: Good   The following questions are about activities you might do during a typical day. Does your health now limit you in these activities?  If so, how much?   Moderate activities, such as moving a table, pushing a vacuum , bowling, or playing golf? 3 No, Not Limited At All   Climbing several flights of stairs? No, Not Limited At All   During the past 4 weeks, have you had any of the following problems with your work or other regular daily activities as a result of your physical health?   Accomplished less than you would like. No, None Of The Time   Were limited in the kind of work or other activities. No, None Of The Time   During the past 4 weeks, have you had any of the following problems with your work or other regular daily activities as a result of any emotional problems (such as feeling depressed or anxious)?   Accomplished less than you would like No, None Of The Time   Didn't do work or other activities as carefully as usual No, None Of The Time   During the past 4 weeks, how much did pain interfere with your normal work (including both work outside the home and house work)? Not At All   How much of the time during the past 4 weeks:   Have you felt calm and peaceful? All Of The Time   Did you have a lot of energy? Most Of The Time   Have you felt downhearted and blue? None Of The Time   During the past 4 weeks, how much of the time has your physical health or emotional problems interfered with your social activities (like visiting with friends, relatives, etc.)?  None Of The Time   Compared to one year ago, how would you rate your physical health in general now? About The Same   Compared to one year ago, how would you rate your emotional problems (such as feeling anxious, depressed or irritable) now? About The Same   Ort Vr-12 Question Pcs (range: 0 - 100) 52.01   Ort Vr-12 Question McS (range: 0 - 100) 61.53          Imaging:    X-rays were personally reviewed today and show implants in good position with no evidence of complication. There have been no significant interval changes.    Impression and Plan:  This patient is now 3 weeks 3 days status post Left Total knee replacement.    The patient is doing well following Total knee replacement surgery.  Antibiotic prophylaxis prior to dental procedures were reviewed.  Long-term failure mechanisms were reviewed.  A new PT prescription was given to the patient and we reviewed exercises to be performed at home to work on improving range of motion and strength. The patient was asked to contact the office sooner if there are any concerns.    RTC: 6 weeks     X-rays at next visit: Postop TKA series    _____________________  Corie Barclay MD   Attending Orthopaedic Surgeon  [facility]    [facility]    This office note was transcribed with dictation software.  Please excuse any typographical errors, program misunderstandings leading to inadvertent insertions or deletions of inappropriate wording, pronoun errors and other unintentional transcription errors not noticed on proof-reading.

## 2025-05-21 ENCOUNTER — HOSPITAL ENCOUNTER (OUTPATIENT)
Dept: RADIOLOGY | Facility: HOSPITAL | Age: 80
Discharge: HOME | End: 2025-05-21
Payer: MEDICARE

## 2025-05-21 ENCOUNTER — APPOINTMENT (OUTPATIENT)
Dept: ORTHOPEDIC SURGERY | Facility: HOSPITAL | Age: 80
End: 2025-05-21
Payer: MEDICARE

## 2025-05-21 ENCOUNTER — HOSPITAL ENCOUNTER (OUTPATIENT)
Dept: CARDIOLOGY | Facility: HOSPITAL | Age: 80
Discharge: HOME | End: 2025-05-21
Payer: MEDICARE

## 2025-05-21 ENCOUNTER — OFFICE VISIT (OUTPATIENT)
Dept: ORTHOPEDIC SURGERY | Facility: HOSPITAL | Age: 80
End: 2025-05-21
Payer: MEDICARE

## 2025-05-21 DIAGNOSIS — I35.0 MODERATE AORTIC STENOSIS BY PRIOR ECHOCARDIOGRAM: ICD-10-CM

## 2025-05-21 DIAGNOSIS — Z96.652 STATUS POST TOTAL LEFT KNEE REPLACEMENT: Primary | ICD-10-CM

## 2025-05-21 DIAGNOSIS — Z96.652 STATUS POST TOTAL LEFT KNEE REPLACEMENT: ICD-10-CM

## 2025-05-21 DIAGNOSIS — I35.8 OTHER NONRHEUMATIC AORTIC VALVE DISORDERS: ICD-10-CM

## 2025-05-21 DIAGNOSIS — I25.84 CORONARY ATHEROSCLEROSIS DUE TO SEVERELY CALCIFIED CORONARY LESION: ICD-10-CM

## 2025-05-21 PROCEDURE — 93325 DOPPLER ECHO COLOR FLOW MAPG: CPT | Performed by: STUDENT IN AN ORGANIZED HEALTH CARE EDUCATION/TRAINING PROGRAM

## 2025-05-21 PROCEDURE — 93308 TTE F-UP OR LMTD: CPT | Performed by: STUDENT IN AN ORGANIZED HEALTH CARE EDUCATION/TRAINING PROGRAM

## 2025-05-21 PROCEDURE — 93321 DOPPLER ECHO F-UP/LMTD STD: CPT | Performed by: STUDENT IN AN ORGANIZED HEALTH CARE EDUCATION/TRAINING PROGRAM

## 2025-05-21 PROCEDURE — 99024 POSTOP FOLLOW-UP VISIT: CPT | Performed by: STUDENT IN AN ORGANIZED HEALTH CARE EDUCATION/TRAINING PROGRAM

## 2025-05-21 PROCEDURE — 93325 DOPPLER ECHO COLOR FLOW MAPG: CPT

## 2025-05-21 PROCEDURE — 73562 X-RAY EXAM OF KNEE 3: CPT | Mod: LT

## 2025-05-21 PROCEDURE — 73562 X-RAY EXAM OF KNEE 3: CPT | Mod: LEFT SIDE | Performed by: RADIOLOGY

## 2025-05-21 RX ORDER — AMOXICILLIN 500 MG/1
2000 CAPSULE ORAL ONCE
Qty: 12 CAPSULE | Refills: 0 | Status: SHIPPED | OUTPATIENT
Start: 2025-05-21 | End: 2025-05-21

## 2025-05-21 NOTE — PROGRESS NOTES
Corie Barclay MD   Adult Reconstruction and Joint Replacement Surgery  Phone: [phone]     Fax: [phone]       Name: [place] Gagnon  Age: 80 y.o.   : 1945   Date of Visit: 2025    FOLLOW UP    Procedure: Left Total knee replacement  Date: 2025    Chief Complaint: Left Total knee replacement surgery follow-up    History of Present Illness:  The patient is now 6 weeks 1 days out from left Total knee replacement surgery.     The patient has mild or occasional pain, pain with stairs, and moderate pain .    Currently taking tylenol for pain.     The patient has intermediate stiffness.     Patient is walking with nothing for assistive device.    The patient goes up and down stairs normally.    Patient can walk 6 blocks.    The patient is doing outpatient physical therapy.    No fevers or drainage from the incision.    There are no concerns.    The patient is mildly limited.     Physical Exam:  The patient is well appearing, alert and oriented to person place and time.    The incision is well healed. There is no sign of wound complication.    Range of Motion: full extension to 130 degrees of flexion.    There is a 0 degree extensor lag.    There is a small effusion in the knee.    There is no instability. The knee is stable to varus-valgus stress and anterior-posterior stress.     Homans sign is negative.    Neurologic, and vascular examinations are normal.    PROMs      Imaging:    X-rays were personally reviewed today and show implants in good position with no evidence of complication.    Impression and Plan:    This patient is 6 weeks 1 days from left Total knee replacement.    The patient is doing well following Total knee replacement surgery.  Antibiotic prophylaxis prior to dental procedures were reviewed.  Long term failure mechanisms were reviewed. Discussed importance of long term follow up. The patient was asked to contact the office sooner if there are any concerns. Their  questions were answered.     RTC: 3 months     X-rays at next visit: Postop TKA series    _____________________  Corie Barclay MD   Attending Orthopaedic Surgeon  [facility]    [facility]    This office note was transcribed with dictation software.  Please excuse any typographical errors, program misunderstandings leading to inadvertent insertions or deletions of inappropriate wording, pronoun errors and other unintentional transcription errors not noticed on proof-reading.

## 2025-05-23 LAB
AORTIC VALVE MEAN GRADIENT: 41 MMHG
AORTIC VALVE PEAK VELOCITY: 4.02 M/S
AV PEAK GRADIENT: 65 MMHG
AVA (PEAK VEL): 1.04 CM2
AVA (VTI): 0.89 CM2
EJECTION FRACTION APICAL 4 CHAMBER: 55.4
EJECTION FRACTION: 56 %
LEFT ATRIUM VOLUME AREA LENGTH INDEX BSA: 34.6 ML/M2
LEFT VENTRICLE INTERNAL DIMENSION DIASTOLE: 5.41 CM (ref 3.5–6)
LEFT VENTRICULAR OUTFLOW TRACT DIAMETER: 2.4 CM
MITRAL VALVE E/A RATIO: 0.81
RIGHT VENTRICLE FREE WALL PEAK S': 16 CM/S
RIGHT VENTRICLE PEAK SYSTOLIC PRESSURE: 43.2 MMHG
TRICUSPID ANNULAR PLANE SYSTOLIC EXCURSION: 2.6 CM

## 2025-06-09 ENCOUNTER — APPOINTMENT (OUTPATIENT)
Dept: CARDIOLOGY | Facility: CLINIC | Age: 80
End: 2025-06-09
Payer: MEDICARE

## 2025-06-09 VITALS
SYSTOLIC BLOOD PRESSURE: 124 MMHG | OXYGEN SATURATION: 94 % | BODY MASS INDEX: 24.78 KG/M2 | DIASTOLIC BLOOD PRESSURE: 71 MMHG | HEIGHT: 69 IN | WEIGHT: 167.3 LBS | HEART RATE: 71 BPM

## 2025-06-09 DIAGNOSIS — I35.0 SEVERE AORTIC STENOSIS: ICD-10-CM

## 2025-06-09 DIAGNOSIS — I25.84 CORONARY ATHEROSCLEROSIS DUE TO SEVERELY CALCIFIED CORONARY LESION: Primary | ICD-10-CM

## 2025-06-09 DIAGNOSIS — R06.09 DOE (DYSPNEA ON EXERTION): ICD-10-CM

## 2025-06-09 PROCEDURE — 99215 OFFICE O/P EST HI 40 MIN: CPT | Performed by: STUDENT IN AN ORGANIZED HEALTH CARE EDUCATION/TRAINING PROGRAM

## 2025-06-09 PROCEDURE — G2211 COMPLEX E/M VISIT ADD ON: HCPCS | Performed by: STUDENT IN AN ORGANIZED HEALTH CARE EDUCATION/TRAINING PROGRAM

## 2025-06-09 PROCEDURE — 1160F RVW MEDS BY RX/DR IN RCRD: CPT | Performed by: STUDENT IN AN ORGANIZED HEALTH CARE EDUCATION/TRAINING PROGRAM

## 2025-06-09 PROCEDURE — 1126F AMNT PAIN NOTED NONE PRSNT: CPT | Performed by: STUDENT IN AN ORGANIZED HEALTH CARE EDUCATION/TRAINING PROGRAM

## 2025-06-09 PROCEDURE — 99212 OFFICE O/P EST SF 10 MIN: CPT | Performed by: STUDENT IN AN ORGANIZED HEALTH CARE EDUCATION/TRAINING PROGRAM

## 2025-06-09 PROCEDURE — 1159F MED LIST DOCD IN RCRD: CPT | Performed by: STUDENT IN AN ORGANIZED HEALTH CARE EDUCATION/TRAINING PROGRAM

## 2025-06-09 PROCEDURE — 3074F SYST BP LT 130 MM HG: CPT | Performed by: STUDENT IN AN ORGANIZED HEALTH CARE EDUCATION/TRAINING PROGRAM

## 2025-06-09 PROCEDURE — 3078F DIAST BP <80 MM HG: CPT | Performed by: STUDENT IN AN ORGANIZED HEALTH CARE EDUCATION/TRAINING PROGRAM

## 2025-06-09 ASSESSMENT — ENCOUNTER SYMPTOMS
OCCASIONAL FEELINGS OF UNSTEADINESS: 0
DEPRESSION: 0
LOSS OF SENSATION IN FEET: 0

## 2025-06-09 ASSESSMENT — PAIN SCALES - GENERAL: PAINLEVEL_OUTOF10: 0-NO PAIN

## 2025-06-09 ASSESSMENT — COLUMBIA-SUICIDE SEVERITY RATING SCALE - C-SSRS
1. IN THE PAST MONTH, HAVE YOU WISHED YOU WERE DEAD OR WISHED YOU COULD GO TO SLEEP AND NOT WAKE UP?: NO
6. HAVE YOU EVER DONE ANYTHING, STARTED TO DO ANYTHING, OR PREPARED TO DO ANYTHING TO END YOUR LIFE?: NO
2. HAVE YOU ACTUALLY HAD ANY THOUGHTS OF KILLING YOURSELF?: NO

## 2025-06-09 NOTE — PROGRESS NOTES
Location of visit: [address]   Type of Visit: Established - First Seen: 12/2/2024     Chief Complaint:  Patient was referred to Cardiology for Follow-up.    History Of Present Illness:    [place] Gagnon is a 80 y.o. male, with history significant for severe coronary calcifications, HTN, HLD, moderate AS on a bicuspid AV, mild aorta dilation with STJ effacement and mild LVH, who visits Cardiology today as a follow up visit  for AS. Since last assessment he had left TKR without complications and his dyspnea on exertion/fatigue has progressed in the last 6 months. Repeated transthoracic echocardiogram showed  severe AS with Vmax 4.0 m/s, peak gradient 65 mmHg, DI 0.20 and RVSP 43 mmHg in context of grade II DD. The aortic valve appears trileaflet and not bicuspid, but was not well visualized due to calcification. He also had recent dental review with cleaning after ABx prophylaxis. Last lipid panel showed an LDL of 57 on 20 mg of Crestor.    Patient denies chest pain, shortness of breath, orthopnea, PND, nocturia, edema, palpitations, dizziness, lightheadedness, syncope, claudication, or snoring/apnea.    Blood pressure: 124/71 mmHg  HR: 71 bpm    Past Medical History:  He has a past medical history of Adverse effect of anesthesia (1/10/2015), Allergic (SPRING POLLEN), CAP (community acquired pneumonia) (02/07/2023), Cataract (1/17/1990), Fractures (1/6/1996), Headache, unspecified (01/16/2019), Hearing aid worn (1/5/1990), Heart murmur (1/10/2024), Heart valve disease (4/4/2024), HL (hearing loss) (1/5/1990), Hypertension (1/18/2006), Other conditions influencing health status, Other intervertebral disc degeneration, lumbar region, Other intervertebral disc degeneration, lumbar region, Personal history of other diseases of male genital organs, Radiculopathy, lumbar region, and Valvular heart disease (2/5/2024).    Past Surgical History:  He has a past surgical history that includes Other surgical history  "(06/25/2013); Other surgical history (06/25/2013); Other surgical history (12/17/2018); Other surgical history (12/17/2018); [place] tooth extraction (1/20/77); Spine surgery (1/10/2015); Circumcision, primary (1944); Tonsillectomy (1/10/52); and Orchiectomy (1/28/54).    Social History:  He reports that he quit smoking about 30 years ago. His smoking use included cigarettes. He has a 30 pack-year smoking history. He has been exposed to tobacco smoke. He has never used smokeless tobacco. He reports current alcohol use of about 1.0 - 2.0 standard drink of alcohol per week. He reports that he does not use drugs.    Family History:  Family History[1]  Allergies:  Patient has no known allergies.    Outpatient Medications:  Current Outpatient Medications   Medication Instructions    acetaminophen (TYLENOL) 1,000 mg, oral, Every 8 hours    hydroCHLOROthiazide (HYDRODIURIL) 25 mg, oral, Daily    latanoprost (Xalatan) 0.005 % ophthalmic solution 1 drop, Nightly    multivitamin tablet 1 tablet, Daily    ondansetron (ZOFRAN) 4 mg, oral, Every 8 hours PRN    pantoprazole (PROTONIX) 40 mg, oral, Daily before breakfast, Do not crush, chew, or split.    rosuvastatin (CRESTOR) 20 mg, oral, Daily     Last Recorded Vitals:  Vitals:    06/09/25 1553   BP: 124/71   BP Location: Right arm   Patient Position: Sitting   BP Cuff Size: Adult   Pulse: 71   SpO2: 94%   Weight: 75.9 kg (167 lb 4.8 oz)   Height: 1.74 m (5' 8.5\")     Physical Exam:      6/9/2025     3:53 PM 4/17/2025    11:15 AM 4/15/2025    11:14 AM 4/11/2025     1:37 PM 4/11/2025     1:03 PM 4/9/2025    12:00 AM   Vitals   Systolic 124 118 106 120 120 106   Diastolic 71 60 60 84 70 60   BP Location Right arm   Left arm     Heart Rate 71 72  76 78 70   Temp  36.4 °C (97.6 °F) 36.3 °C (97.4 °F) 36.4 °C (97.6 °F) 36.9 °C (98.4 °F) 36.7 °C (98.1 °F)   Height 1.74 m (5' 8.5\")        Weight (lb) 167.3        BMI 25.07 kg/m2        BSA (m2) 1.92 m2        Visit Report Report      "     Wt Readings from Last 5 Encounters:   06/09/25 75.9 kg (167 lb 4.8 oz)   04/08/25 78.6 kg (173 lb 4.5 oz)   04/01/25 77.1 kg (170 lb)   03/26/25 78.8 kg (173 lb 11.2 oz)   03/04/25 79.2 kg (174 lb 8 oz)     General: Sitting up comfortably in chair; in no apparent distress.  HEENT: Normocephalic; atraumatic. Well hydrated.  Eyes: Anicteric sclera. Extraocular movement intact.  Neck: Supple; no thyromegaly; normal jugular venous pressure, no bruits.  Respiratory: Bilateral air entry equal. No wheezing.  Cardiovascular: Normal S1, S2; no murmurs auscultated.  Abdomen: Nondistended; nontender. (+) bowel sounds.  Extremities: No peripheral edema present. Pulses 2+ diffusely.  Neurological: Oriented to time, place, and person; nonfocal.  Psychiatric: Normal affect.     Last Labs Reviewed:  CBC -  Recent Labs     03/26/25  1411 03/22/24  0902 03/21/23  1512 01/12/22  1450 01/11/21  0000   WBC 8.5 8.6 8.3 8.7 8.9   HGB 13.4* 13.8 14.3 15.6 14.8   HCT 42.3 42.7 45.4 48.1 44.7    226 265 247 227   MCV 94 96 94 94 93     CMP -  Recent Labs     03/26/25  1411 03/22/24  0902 03/21/23  1512 01/12/22  1450 01/11/21  0000    140 140 142 142   K 3.8 3.8 3.3* 3.6 3.9    103 103 104 103   CO2 30 27 27 29 31   ANIONGAP 10 14 13 13 12   BUN 21 21 18 20 20   CREATININE 0.96 1.00 1.12 0.91 1.04   EGFR 79 76  --   --   --    CALCIUM 9.6 9.7 9.7 10.0 9.6     Recent Labs     03/26/25  1411 03/22/24  0902 03/21/23  1512 01/12/22  1450 01/11/21  0000   ALBUMIN 4.2 4.1 4.1 4.3 4.1   ALKPHOS 40 45 47 58 49   ALT 18 22 20 29 18   AST 20 18 19 23 22   BILITOT 0.4 0.8 0.6 0.5 0.5     LIPID PANEL -   Recent Labs     03/26/25  1411 03/22/24  0902 03/21/23  1512 01/12/22  1450 01/11/21  0000   CHOL 141 162 249* 238* 208*   LDLF  --   --  153* 146* 92   LDLCALC 57 72  --   --   --    HDL 68.2 76.2 72.3 65.0 48.0   TRIG 79 69 117 134 342*     HEME/ENDO -  Recent Labs     03/26/25  1411   HGBA1C 5.7*     CARDIOVASCULAR  Recent Labs      03/26/25  1411 06/07/24  0858   BNP 45 35     Last Cardiology/Imaging Tests Personally Reviewed (if images available) and Interpreted:  ECG:  Encounter Date: 03/26/25   ECG 12 Lead   Result Value    Ventricular Rate 72    Atrial Rate 72    NE Interval 188    QRS Duration 84    QT Interval 390    QTC Calculation(Bazett) 427    R Axis 16    T Axis 72    QRS Count 11    Q Onset 222    P Onset 128    P Offset 160    T Offset 417    QTC Fredericia 414    Narrative    Sinus rhythm with Premature atrial complexes with Aberrant conduction  Nonspecific T wave abnormality     Echocardiogram:  Recent Labs     05/21/25  0932 05/02/24  1710   EF 56  --    LVIDD 5.41 5.03   RV 43.2  --    RVFRWALLPKSP 16.00 12.00   TAPSE 2.6 2.5     Transthoracic Echo (TTE) Complete 05/02/2024  1. Left ventricular systolic function is low normal with a 50-55% estimated ejection fraction.  2. There is mild asymmetric left ventricular hypertrophy.  3. Moderate aortic valve stenosis.  4. There is moderate aortic valve cusp calcification.  5. Mild aortic valve regurgitation.    Transthoracic Echo Complete 05/02/2024 on my review:   1. Left ventricular ejection fraction is normal calculated by Honeycutt's biplane at 56%.   2. Spectral Doppler shows a Grade II (pseudonormal pattern) of left ventricular diastolic filling with an elevated left atrial pressure.   3. There is normal right ventricular global systolic function.   4. The Doppler estimated RVSP is mildly elevated at 43 mmHg.   5. The left atrium is mildly dilated.   6. Severe aortic valve stenosis. The peak and mean gradients are 65 mmHg and 41 mmHg respectively.   7. There is severe aortic valve cusp calcification.   8. Mild aortic valve regurgitation.   9. Aortic valve appeared trileaflet but with some doming, bicuspid anatomy cannot be ruled out.   10. Compared with study dated 5/2/2024, there has been progression of aortic stenosis with worsening valve area (FRANCESCA decreased from 1.13 to  0.89 cmï¿½) and reduced dimensionless index (DI decreased from 0.27 to 0.20). Correspondingly, there is increased peak velocity (4.0 m/s vs 3.02 m/s) and men agradient (41 mmHg vs 23 mmHg).    Cath:  No results found.    Stress Test:  Stress Test 06/07/2024  - The patient exercised to stage III on a Peryc protocol for 7 minutes and 00 seconds, achieving 8.3 METS.  - The peak heart rate achieved was 131 bpm, which was 94% of the age predicted target heart rate of 140 bpm.   - Normal blood pressure response.  - The patient developed shortness of breath and dizziness during the stress exam.   - No clinical or electrocardiographic evidence for ischemia at a maximal workload.      Cardiac CT/MRI:  CT cardiac scoring wo IV contrast 11/19/2024  FINDINGS:  The score and distribution of calcium in the coronary arteries is as follows:  .23,  .77,  LCx 0,  RCA 55.06,  Total 608.06    Aortic valve calcium score 2566 AU     The visualized mid/lower ascending thoracic aorta measures 4.1 cm in diameter. The heart is normal in size. No pericardial effusion is present. Severe calcification of the aortic valve.    CV RISK FACTORS:   # Hypertension: Last BP: 124/71.  # Hyperlipidemia: Last Tchol 141 / LDL No results found for requested labs within last 365 days. / HDL 68.2 / TRIG 79 (3/26/2025:  2:11 PM).  # Type II Diabetes Mellitus: Last A1c 5.7 (3/26/2025:  2:11 PM).  # Obesity: Last BMI: 25.07.  # CKD: Last BUN/Cr (GFR): 21/0.96 (79), 3/26/2025:  2:11 PM.    ASCV RISK:  The ASCVD Risk score ([place] DK, et al., 2019) failed to calculate for the following reasons:    The 2019 ASCVD risk score is only valid for ages 40 to 79    Assessment:  80 y.o. male, with history significant for severe coronary calcifications, HTN, HLD, HFpEF, mild PH. severe AS on a previously described bicuspid AV, mild aorta dilation with STJ effacement and mild LVH, who visits Cardiology today as a follow up visit  for symptomatic progressive  AS.  Assessment & Plan  Coronary atherosclerosis due to severely calcified coronary lesion  - Continue aspirin and high dose statin  - Will require coronary anatomy assessment in context of severe AS  PETERSON (dyspnea on exertion)  - Explained by HFpEF due to severe AS, associated ischemia cannot be ruled out  Severe aortic stenosis  - Structural cardiology referral for TAVR/SAVR assessment    Patient will follow up with me in the Cardiology office after completing assessment and treatment of AS.  I spent 40 minutes assessing the case between pre-charting, face-to-face patient interaction, and documentation    Prem Stover MD       [1]   Family History  Problem Relation Name Age of Onset    Heart attack Mother Jerri Spott 84    Hypertension Father FAB SPOTLINH

## 2025-06-10 LAB
CREAT SERPL-MCNC: 0.85 MG/DL (ref 0.7–1.22)
EGFRCR SERPLBLD CKD-EPI 2021: 87 ML/MIN/1.73M2

## 2025-06-11 ENCOUNTER — TELEMEDICINE (OUTPATIENT)
Dept: CARDIOLOGY | Facility: HOSPITAL | Age: 80
End: 2025-06-11
Payer: MEDICARE

## 2025-06-11 ENCOUNTER — APPOINTMENT (OUTPATIENT)
Dept: CARDIOLOGY | Facility: HOSPITAL | Age: 80
End: 2025-06-11
Payer: MEDICARE

## 2025-06-11 ENCOUNTER — TELEMEDICINE (OUTPATIENT)
Dept: CARDIAC SURGERY | Facility: HOSPITAL | Age: 80
End: 2025-06-11
Payer: MEDICARE

## 2025-06-11 DIAGNOSIS — R06.09 DOE (DYSPNEA ON EXERTION): ICD-10-CM

## 2025-06-11 DIAGNOSIS — I25.84 CORONARY ATHEROSCLEROSIS DUE TO SEVERELY CALCIFIED CORONARY LESION: ICD-10-CM

## 2025-06-11 DIAGNOSIS — I35.0 SEVERE AORTIC STENOSIS: ICD-10-CM

## 2025-06-11 DIAGNOSIS — I35.9 AORTIC VALVE DISEASE: Primary | ICD-10-CM

## 2025-06-11 LAB
AORTIC VALVE MEAN GRADIENT: 41 MMHG
AORTIC VALVE PEAK VELOCITY: 4.02 M/S
AV PEAK GRADIENT: 65 MMHG
AVA (PEAK VEL): 1.04 CM2
AVA (VTI): 0.89 CM2
EJECTION FRACTION APICAL 4 CHAMBER: 55.4
EJECTION FRACTION: 56 %
LEFT ATRIUM VOLUME AREA LENGTH INDEX BSA: 34.6 ML/M2
LEFT VENTRICLE INTERNAL DIMENSION DIASTOLE: 5.41 CM (ref 3.5–6)
LEFT VENTRICULAR OUTFLOW TRACT DIAMETER: 2.4 CM
MITRAL VALVE E/A RATIO: 0.81
RIGHT VENTRICLE FREE WALL PEAK S': 16 CM/S
RIGHT VENTRICLE PEAK SYSTOLIC PRESSURE: 43 MMHG
TRICUSPID ANNULAR PLANE SYSTOLIC EXCURSION: 2.6 CM

## 2025-06-11 PROCEDURE — 99205 OFFICE O/P NEW HI 60 MIN: CPT | Performed by: THORACIC SURGERY (CARDIOTHORACIC VASCULAR SURGERY)

## 2025-06-11 NOTE — PROGRESS NOTES
Referral from Dr. Stover. Contacting [place] to discuss treatment recommendations for aortic valve stenosis. Hannah Foster RN

## 2025-06-11 NOTE — ASSESSMENT & PLAN NOTE
- Continue aspirin and high dose statin  - Will require coronary anatomy assessment in context of severe AS

## 2025-06-11 NOTE — PROGRESS NOTES
Virtual visit for aortic stenosis  60 minutes were spent on this encounter including 30 minutes face to face  with patient, with patient's consent.     PCP: JuanC Fischer: Ck    HPI    80 y.o. male with PMH of HTN, HLD, s/p knee replacement surgery 6 weeks ago  presents for evaluation of severe, mildly symptomatic aortic stenosis.  Patient relates worsening fatigue, needing to take breaks when walking long distance or exercising on bike but denies SOB, PETERSON.  Denies overt orthopnea, PND, exertional CP.  Denies syncope or presyncope.  Denies increased abdominal girth, edema.    Full 14 point ROS complete and negative except as noted above.     Past Medical History:  He has a past medical history of Adverse effect of anesthesia (1/10/2015), Allergic (SPRING POLLEN), CAP (community acquired pneumonia) (02/07/2023), Cataract (1/17/1990), Fractures (1/6/1996), Headache, unspecified (01/16/2019), Hearing aid worn (1/5/1990), Heart murmur (1/10/2024), Heart valve disease (4/4/2024), HL (hearing loss) (1/5/1990), Hypertension (1/18/2006), Other conditions influencing health status, Other intervertebral disc degeneration, lumbar region, Other intervertebral disc degeneration, lumbar region, Personal history of other diseases of male genital organs, Radiculopathy, lumbar region, and Valvular heart disease (2/5/2024).    Past Surgical History:  He has a past surgical history that includes Other surgical history (06/25/2013); Other surgical history (06/25/2013); Other surgical history (12/17/2018); Other surgical history (12/17/2018); [place] tooth extraction (1/20/77); Spine surgery (1/10/2015); Circumcision, primary (1944); Tonsillectomy (1/10/52); and Orchiectomy (1/28/54).    Echo: 55% EF , severe aortic stenosis, AV gradient 65/41, AV Vmax 4.02 (progressed from 3.02 one year ago), FRANCESCA 0.9, DI 0.2, mild AI.   EKG: NSR, 188ms MD, nl axis, 86ms QRS    TAVR Workup:   - NYHA: II  - LHC: pending   - CT TAVR:  scheduled    - dental clearance:  regular dentist visits q6 months, no issues.     EFT 0/5  STS     Procedure Type: Isolated AVR  Perioperative Outcome Estimate %  Operative Mortality 1.4%  Morbidity & Mortality 5.98%  Stroke 1.21%  Renal Failure 0.824%  Reoperation 3.38%  Prolonged Ventilation 2.39%  Deep Sternal Wound Infection 0.032%  Long Hospital Stay (>14 days) 2.6%  Short Hospital Stay (<6 days)* 54.3%                 Social History     Tobacco Use    Smoking status: Former     Current packs/day: 0.00     Average packs/day: 1 pack/day for 30.0 years (30.0 ttl pk-yrs)     Types: Cigarettes     Quit date: 1995     Years since quittin.4     Passive exposure: Past    Smokeless tobacco: Never   Substance Use Topics    Alcohol use: Yes     Alcohol/week: 1.0 - 2.0 standard drink of alcohol     Types: 1 - 2 Glasses of wine per week        Family History[1]     RX Allergies[2]     Current Outpatient Medications   Medication Instructions    hydroCHLOROthiazide (HYDRODIURIL) 25 mg, oral, Daily    latanoprost (Xalatan) 0.005 % ophthalmic solution 1 drop, Nightly    multivitamin tablet 1 tablet, Daily    rosuvastatin (CRESTOR) 20 mg, oral, Daily        There were no vitals filed for this visit.     Physical Exam:  Constitutional: alert and in no acute distress.        Last Labs:  CBC - 3/26/2025:  2:11 PM  8.5 13.4 218    42.3      BMP  139  103  21                  ----------------<89     3.8  30  0.85     CMP - 3/26/2025:  2:11 PM  9.6 6.5 20 --- 0.4   _ 4.2 18 40      PTT - No results in last year.  _   _ _     BNP   Date/Time Value Ref Range Status   2025 02:11 PM 45 0 - 99 pg/mL Final         Impression:   80 y.o. male with PMH of HTN, HLD, s/p knee replacement surgery 6 weeks ago  presents for evaluation of severe, mildly symptomatic aortic stenosis. In addition, he has rapidly progressive disease with AV peak velocity that increased from 3.02 to 4.02 in the last year. Thus, we recommend intervention for  his aortic valve, favoring TAVR.    Plan:   Needs CT TAVR  Needs [facility]    The overall decision regarding the best treatment for this condition is complex.  We discussed options of both surgical aortic valve replacement and transcatheter aortic valve replacement along with risks and benefits involved with both of them in detail.    We discussed all the risks associated with the procedure, including but not limited to stroke, MI, pericardial tamponade, vascular complications, infection and death were discussed with the patient. The risk of needing a permament pacemaker was also discussed in detail. The patient verbalized understanding and decided to proceed with the procedure.     We will discuss this patient's case at our Valve Team meeting with representatives from Structural Heart and Cardiac Surgery. Our nurse navigators will contact patient with further diagnostic needs and formal plan.              [1]   Family History  Problem Relation Name Age of Onset    Heart attack Mother Jerri Spott 84    Hypertension Father SANON SPOTT    [2] No Known Allergies

## 2025-06-11 NOTE — PROGRESS NOTES
Virtual visit for aortic stenosis  60 minutes were spent on this encounter including 30 minutes face to face  with patient, with patient's consent.     PCP: Juan C Fischer: Ck    HPI    80 y.o. male with PMH of HTN, HLD, s/p knee replacement surgery 6 weeks ago  presents for evaluation of severe, mildly symptomatic aortic stenosis.  Patient relates worsening fatigue, needing to take breaks when walking long distance or exercising on bike but denies SOB, PETERSON.  Denies overt orthopnea, PND, exertional CP.  Denies syncope or presyncope.  Denies increased abdominal girth, edema.    Full 14 point ROS complete and negative except as noted above.     Past Medical History:  He has a past medical history of Adverse effect of anesthesia (1/10/2015), Allergic (SPRING POLLEN), CAP (community acquired pneumonia) (02/07/2023), Cataract (1/17/1990), Fractures (1/6/1996), Headache, unspecified (01/16/2019), Hearing aid worn (1/5/1990), Heart murmur (1/10/2024), Heart valve disease (4/4/2024), HL (hearing loss) (1/5/1990), Hypertension (1/18/2006), Other conditions influencing health status, Other intervertebral disc degeneration, lumbar region, Other intervertebral disc degeneration, lumbar region, Personal history of other diseases of male genital organs, Radiculopathy, lumbar region, and Valvular heart disease (2/5/2024).    Past Surgical History:  He has a past surgical history that includes Other surgical history (06/25/2013); Other surgical history (06/25/2013); Other surgical history (12/17/2018); Other surgical history (12/17/2018); [place] tooth extraction (1/20/77); Spine surgery (1/10/2015); Circumcision, primary (1944); Tonsillectomy (1/10/52); and Orchiectomy (1/28/54).    Echo: 55% EF , severe aortic stenosis, AV gradient 65/41, AV Vmax 4.02 (progressed from 3.02 one year ago), FRANCESCA 0.9, DI 0.2, mild AI.   EKG: NSR, 188ms UT, nl axis, 86ms QRS    TAVR Workup:   - NYHA: II  - LHC: pending   - CT TAVR:  scheduled    - dental clearance:  regular dentist visits q6 months, no issues.     EFT 0/5  STS     Procedure Type: Isolated AVR  Perioperative Outcome Estimate %  Operative Mortality 1.4%  Morbidity & Mortality 5.98%  Stroke 1.21%  Renal Failure 0.824%  Reoperation 3.38%  Prolonged Ventilation 2.39%  Deep Sternal Wound Infection 0.032%  Long Hospital Stay (>14 days) 2.6%  Short Hospital Stay (<6 days)* 54.3%                 Social History     Tobacco Use    Smoking status: Former     Current packs/day: 0.00     Average packs/day: 1 pack/day for 30.0 years (30.0 ttl pk-yrs)     Types: Cigarettes     Quit date: 1995     Years since quittin.4     Passive exposure: Past    Smokeless tobacco: Never   Substance Use Topics    Alcohol use: Yes     Alcohol/week: 1.0 - 2.0 standard drink of alcohol     Types: 1 - 2 Glasses of wine per week        [Family History]     [Family History]  Problem Relation Name Age of Onset    Heart attack Mother Jerri Lovingt 84    Hypertension Father FAB HANKS        [RX Allergies]     [RX Allergies]  Allergies  No Known Allergies      Current Outpatient Medications   Medication Instructions    hydroCHLOROthiazide (HYDRODIURIL) 25 mg, oral, Daily    latanoprost (Xalatan) 0.005 % ophthalmic solution 1 drop, Nightly    multivitamin tablet 1 tablet, Daily    rosuvastatin (CRESTOR) 20 mg, oral, Daily        There were no vitals filed for this visit.     Physical Exam:  Constitutional: alert and in no acute distress.        Last Labs:  CBC - 3/26/2025:  2:11 PM  8.5 13.4 218    42.3      BMP  139  103  21                  ----------------<89     3.8  30  0.85     CMP - 3/26/2025:  2:11 PM  9.6 6.5 20 --- 0.4   _ 4.2 18 40      PTT - No results in last year.  _   _ _     BNP   Date/Time Value Ref Range Status   2025 02:11 PM 45 0 - 99 pg/mL Final         Impression:   80 y.o. male with PMH of HTN, HLD, s/p knee replacement surgery 6 weeks ago  presents for evaluation of severe, mildly  symptomatic aortic stenosis. In addition, he has rapidly progressive disease with AV peak velocity that increased from 3.02 to 4.02 in the last year. Thus, we recommend intervention for his aortic valve, favoring TAVR.    Plan:   Needs CT TAVR  Needs [facility]    The overall decision regarding the best treatment for this condition is complex.  We discussed options of both surgical aortic valve replacement and transcatheter aortic valve replacement along with risks and benefits involved with both of them in detail.    We discussed all the risks associated with the procedure, including but not limited to stroke, MI, pericardial tamponade, vascular complications, infection and death were discussed with the patient. The risk of needing a permament pacemaker was also discussed in detail. The patient verbalized understanding and decided to proceed with the procedure.     We will discuss this patient's case at our Valve Team meeting with representatives from Structural Heart and Cardiac Surgery. Our nurse navigators will contact patient with further diagnostic needs and formal plan.

## 2025-06-11 NOTE — H&P (VIEW-ONLY)
Virtual visit for aortic stenosis  60 minutes were spent on this encounter including 30 minutes face to face  with patient, with patient's consent.     PCP: Juan C Fischer: Ck    HPI    80 y.o. male with PMH of HTN, HLD, s/p knee replacement surgery 6 weeks ago  presents for evaluation of severe, mildly symptomatic aortic stenosis.  Patient relates worsening fatigue, needing to take breaks when walking long distance or exercising on bike but denies SOB, PETERSON.  Denies overt orthopnea, PND, exertional CP.  Denies syncope or presyncope.  Denies increased abdominal girth, edema.    Full 14 point ROS complete and negative except as noted above.     Past Medical History:  He has a past medical history of Adverse effect of anesthesia (1/10/2015), Allergic ([place]), CAP (community acquired pneumonia) (02/07/2023), Cataract (1/17/1990), Fractures (1/6/1996), Headache, unspecified (01/16/2019), Hearing aid worn (1/5/1990), Heart murmur (1/10/2024), Heart valve disease (4/4/2024), HL (hearing loss) (1/5/1990), Hypertension (1/18/2006), Other conditions influencing health status, Other intervertebral disc degeneration, lumbar region, Other intervertebral disc degeneration, lumbar region, Personal history of other diseases of male genital organs, Radiculopathy, lumbar region, and Valvular heart disease (2/5/2024).    Past Surgical History:  He has a past surgical history that includes Other surgical history (06/25/2013); Other surgical history (06/25/2013); Other surgical history (12/17/2018); Other surgical history (12/17/2018); [place] tooth extraction (1/20/77); Spine surgery (1/10/2015); Circumcision, primary (1944); Tonsillectomy (1/10/52); and Orchiectomy (1/28/54).    Echo: 55% EF , severe aortic stenosis, AV gradient 65/41, AV Vmax 4.02 (progressed from 3.02 one year ago), FRANCESCA 0.9, DI 0.2, mild AI.   EKG: NSR, 188ms DC, nl axis, 86ms QRS    TAVR Workup:   - NYHA: II  - LHC: pending   - CT TAVR:  scheduled    - dental clearance:  regular dentist visits q6 months, no issues.     EFT 0/5  STS     Procedure Type: Isolated AVR  Perioperative Outcome Estimate %  Operative Mortality 1.4%  Morbidity & Mortality 5.98%  Stroke 1.21%  Renal Failure 0.824%  Reoperation 3.38%  Prolonged Ventilation 2.39%  Deep Sternal Wound Infection 0.032%  Long Hospital Stay (>14 days) 2.6%  Short Hospital Stay (<6 days)* 54.3%                 Social History     Tobacco Use    Smoking status: Former     Current packs/day: 0.00     Average packs/day: 1 pack/day for 30.0 years (30.0 ttl pk-yrs)     Types: Cigarettes     Quit date: 1995     Years since quittin.4     Passive exposure: Past    Smokeless tobacco: Never   Substance Use Topics    Alcohol use: Yes     Alcohol/week: 1.0 - 2.0 standard drink of alcohol     Types: 1 - 2 Glasses of wine per week        [Family History]     [Family History]  Problem Relation Name Age of Onset    Heart attack Mother Jerri Lovingt 84    Hypertension Father FAB HANKS        [RX Allergies]     [RX Allergies]  Allergies  No Known Allergies      Current Outpatient Medications   Medication Instructions    hydroCHLOROthiazide (HYDRODIURIL) 25 mg, oral, Daily    latanoprost (Xalatan) 0.005 % ophthalmic solution 1 drop, Nightly    multivitamin tablet 1 tablet, Daily    rosuvastatin (CRESTOR) 20 mg, oral, Daily        There were no vitals filed for this visit.     Physical Exam:  Constitutional: alert and in no acute distress.        Last Labs:  CBC - 3/26/2025:  2:11 PM  8.5 13.4 218    42.3      BMP  139  103  21                  ----------------<89     3.8  30  0.85     CMP - 3/26/2025:  2:11 PM  9.6 6.5 20 --- 0.4   _ 4.2 18 40      PTT - No results in last year.  _   _ _     BNP   Date/Time Value Ref Range Status   2025 02:11 PM 45 0 - 99 pg/mL Final         Impression:   80 y.o. male with PMH of HTN, HLD, s/p knee replacement surgery 6 weeks ago  presents for evaluation of severe, mildly  symptomatic aortic stenosis. In addition, he has rapidly progressive disease with AV peak velocity that increased from 3.02 to 4.02 in the last year. Thus, we recommend intervention for his aortic valve, favoring TAVR.    Plan:   Needs CT TAVR  Needs [facility]    The overall decision regarding the best treatment for this condition is complex.  We discussed options of both surgical aortic valve replacement and transcatheter aortic valve replacement along with risks and benefits involved with both of them in detail.    We discussed all the risks associated with the procedure, including but not limited to stroke, MI, pericardial tamponade, vascular complications, infection and death were discussed with the patient. The risk of needing a permament pacemaker was also discussed in detail. The patient verbalized understanding and decided to proceed with the procedure.     We will discuss this patient's case at our Valve Team meeting with representatives from Structural Heart and Cardiac Surgery. Our nurse navigators will contact patient with further diagnostic needs and formal plan.

## 2025-06-12 ENCOUNTER — HOSPITAL ENCOUNTER (OUTPATIENT)
Dept: RADIOLOGY | Facility: HOSPITAL | Age: 80
Discharge: HOME | End: 2025-06-12
Payer: MEDICARE

## 2025-06-12 DIAGNOSIS — I25.84 CORONARY ATHEROSCLEROSIS DUE TO SEVERELY CALCIFIED CORONARY LESION: ICD-10-CM

## 2025-06-12 DIAGNOSIS — I35.0 SEVERE AORTIC STENOSIS: ICD-10-CM

## 2025-06-12 DIAGNOSIS — R06.09 DOE (DYSPNEA ON EXERTION): ICD-10-CM

## 2025-06-12 PROCEDURE — 2550000001 HC RX 255 CONTRASTS: Performed by: STUDENT IN AN ORGANIZED HEALTH CARE EDUCATION/TRAINING PROGRAM

## 2025-06-12 PROCEDURE — 74174 CTA ABD&PLVS W/CONTRAST: CPT

## 2025-06-12 RX ADMIN — IOHEXOL 80 ML: 350 INJECTION, SOLUTION INTRAVENOUS at 09:54

## 2025-06-16 ENCOUNTER — TELEPHONE (OUTPATIENT)
Dept: CARDIOLOGY | Facility: HOSPITAL | Age: 80
End: 2025-06-16
Payer: MEDICARE

## 2025-06-16 ENCOUNTER — APPOINTMENT (OUTPATIENT)
Dept: CARDIOLOGY | Facility: HOSPITAL | Age: 80
End: 2025-06-16
Payer: MEDICARE

## 2025-06-16 DIAGNOSIS — I35.0 NONRHEUMATIC AORTIC (VALVE) STENOSIS: Primary | ICD-10-CM

## 2025-06-16 NOTE — TELEPHONE ENCOUNTER
Confirmation received regarding procedure for 6-18-25. Requested call back to confirm when he will get call from cath lab. Called back & left notification to expect a call tomorrow with details for arrival time.

## 2025-06-16 NOTE — TELEPHONE ENCOUNTER
Called & left message [facility] being scheduled for 6-18-25 with Dr. Clifford. Request call back to confirm prep/location. Left message pre-procedural lab work needed.

## 2025-06-18 ENCOUNTER — HOSPITAL ENCOUNTER (OUTPATIENT)
Facility: HOSPITAL | Age: 80
Setting detail: OUTPATIENT SURGERY
Discharge: HOME | End: 2025-06-18
Attending: INTERNAL MEDICINE | Admitting: INTERNAL MEDICINE
Payer: MEDICARE

## 2025-06-18 VITALS
RESPIRATION RATE: 17 BRPM | DIASTOLIC BLOOD PRESSURE: 73 MMHG | OXYGEN SATURATION: 100 % | HEART RATE: 67 BPM | SYSTOLIC BLOOD PRESSURE: 144 MMHG

## 2025-06-18 DIAGNOSIS — I35.0 NONRHEUMATIC AORTIC (VALVE) STENOSIS: ICD-10-CM

## 2025-06-18 LAB
ANION GAP SERPL CALC-SCNC: 11 MMOL/L (ref 10–20)
BUN SERPL-MCNC: 18 MG/DL (ref 6–23)
CALCIUM SERPL-MCNC: 9.5 MG/DL (ref 8.6–10.6)
CHLORIDE SERPL-SCNC: 104 MMOL/L (ref 98–107)
CO2 SERPL-SCNC: 29 MMOL/L (ref 21–32)
CREAT SERPL-MCNC: 0.87 MG/DL (ref 0.5–1.3)
EGFRCR SERPLBLD CKD-EPI 2021: 87 ML/MIN/1.73M*2
ERYTHROCYTE [DISTWIDTH] IN BLOOD BY AUTOMATED COUNT: 12 % (ref 11.5–14.5)
GLUCOSE SERPL-MCNC: 104 MG/DL (ref 74–99)
HCT VFR BLD AUTO: 42.4 % (ref 41–52)
HGB BLD-MCNC: 13.3 G/DL (ref 13.5–17.5)
MCH RBC QN AUTO: 29.6 PG (ref 26–34)
MCHC RBC AUTO-ENTMCNC: 31.4 G/DL (ref 32–36)
MCV RBC AUTO: 94 FL (ref 80–100)
NRBC BLD-RTO: 0 /100 WBCS (ref 0–0)
PLATELET # BLD AUTO: 184 X10*3/UL (ref 150–450)
POTASSIUM SERPL-SCNC: 4 MMOL/L (ref 3.5–5.3)
RBC # BLD AUTO: 4.49 X10*6/UL (ref 4.5–5.9)
SODIUM SERPL-SCNC: 140 MMOL/L (ref 136–145)
WBC # BLD AUTO: 8.8 X10*3/UL (ref 4.4–11.3)

## 2025-06-18 PROCEDURE — 7100000009 HC PHASE TWO TIME - INITIAL BASE CHARGE: Performed by: INTERNAL MEDICINE

## 2025-06-18 PROCEDURE — 85027 COMPLETE CBC AUTOMATED: CPT | Performed by: INTERNAL MEDICINE

## 2025-06-18 PROCEDURE — 80048 BASIC METABOLIC PNL TOTAL CA: CPT | Performed by: INTERNAL MEDICINE

## 2025-06-18 PROCEDURE — 99152 MOD SED SAME PHYS/QHP 5/>YRS: CPT | Performed by: INTERNAL MEDICINE

## 2025-06-18 PROCEDURE — 93454 CORONARY ARTERY ANGIO S&I: CPT | Performed by: INTERNAL MEDICINE

## 2025-06-18 PROCEDURE — 7100000010 HC PHASE TWO TIME - EACH INCREMENTAL 1 MINUTE: Performed by: INTERNAL MEDICINE

## 2025-06-18 PROCEDURE — C1760 CLOSURE DEV, VASC: HCPCS | Performed by: INTERNAL MEDICINE

## 2025-06-18 PROCEDURE — 2500000004 HC RX 250 GENERAL PHARMACY W/ HCPCS (ALT 636 FOR OP/ED): Performed by: INTERNAL MEDICINE

## 2025-06-18 PROCEDURE — C1894 INTRO/SHEATH, NON-LASER: HCPCS | Performed by: INTERNAL MEDICINE

## 2025-06-18 PROCEDURE — 2720000007 HC OR 272 NO HCPCS: Performed by: INTERNAL MEDICINE

## 2025-06-18 PROCEDURE — 2550000001 HC RX 255 CONTRASTS: Performed by: INTERNAL MEDICINE

## 2025-06-18 RX ORDER — VERAPAMIL HYDROCHLORIDE 2.5 MG/ML
INJECTION INTRAVENOUS AS NEEDED
Status: DISCONTINUED | OUTPATIENT
Start: 2025-06-18 | End: 2025-06-18 | Stop reason: HOSPADM

## 2025-06-18 RX ORDER — MIDAZOLAM HYDROCHLORIDE 1 MG/ML
INJECTION, SOLUTION INTRAMUSCULAR; INTRAVENOUS AS NEEDED
Status: DISCONTINUED | OUTPATIENT
Start: 2025-06-18 | End: 2025-06-18 | Stop reason: HOSPADM

## 2025-06-18 RX ORDER — HEPARIN SODIUM 1000 [USP'U]/ML
INJECTION, SOLUTION INTRAVENOUS; SUBCUTANEOUS AS NEEDED
Status: DISCONTINUED | OUTPATIENT
Start: 2025-06-18 | End: 2025-06-18 | Stop reason: HOSPADM

## 2025-06-18 RX ORDER — FENTANYL CITRATE 50 UG/ML
INJECTION, SOLUTION INTRAMUSCULAR; INTRAVENOUS AS NEEDED
Status: DISCONTINUED | OUTPATIENT
Start: 2025-06-18 | End: 2025-06-18 | Stop reason: HOSPADM

## 2025-06-18 RX ORDER — LIDOCAINE HYDROCHLORIDE 10 MG/ML
INJECTION, SOLUTION EPIDURAL; INFILTRATION; INTRACAUDAL; PERINEURAL AS NEEDED
Status: DISCONTINUED | OUTPATIENT
Start: 2025-06-18 | End: 2025-06-18 | Stop reason: HOSPADM

## 2025-06-18 NOTE — POST-PROCEDURE NOTE
Physician Transition of Care Summary  Invasive Cardiovascular Lab    Procedure Date: 6/18/2025  Attending:    * Krista Clifford - Primary  Resident/Fellow/Other Assistant: Surgeons and Role:     * Courtney Christensen MD - Fellow     * Lc Mendez MD - Fellow    Indications:   Pre-op Diagnosis      * Nonrheumatic aortic (valve) stenosis [I35.0]    Post-procedure diagnosis:   Post-op Diagnosis     * Nonrheumatic aortic (valve) stenosis [I35.0]    Procedure(s):   [facility], With LV  [address] - [place] CATH PLMT L HRT & ARTS W/NJX & ANGIO IMG S&I        Procedure Findings:   Uncomplicated right radial access demonstrating right dominant system, non obstructive coronary artery disease.     Hemostasis via TR band      Description of the Procedure:   As above    Complications:   None    Stents/Implants:   Implants       No implant documentation for this case.            Anticoagulation/Antiplatelet Plan:   N/a    Estimated Blood Loss:   5 mL    Anesthesia: Moderate Sedation Anesthesia Staff: No anesthesia staff entered.    Any Specimen(s) Removed:   Order Name Source Comment Collection Info Order Time   CBC Blood, Venous Pre-op diagnosis:  Nonrheumatic aortic (valve) stenosis [I35.0] Collected By: Krista Clifford MD 6/18/2025 10:28 AM     Release result to [employer]   Immediate        BASIC METABOLIC PANEL Blood, Venous Pre-op diagnosis:  Nonrheumatic aortic (valve) stenosis [I35.0] Collected By: Krista Clifford MD 6/18/2025 10:28 AM     Release result to [employer]   Immediate            Disposition:   Home      Electronically signed by: Lc Mendez MD, 6/18/2025 12:46 PM

## 2025-06-18 NOTE — Clinical Note
No Retinal holes, Tears or Detachments. Patient Clipped and Prepped: right groin and right radial. Prepped with ChloraPrep, a minimum of 3 minute dry time, longer if needed, no pooling noted, patient draped in sterile fashion.

## 2025-06-18 NOTE — DISCHARGE INSTRUCTIONS
CARDIAC CATHETERIZATION DISCHARGE INSTRUCTIONS (procedure done on 6/18/25)     FOR SUDDEN AND SEVERE CHEST PAIN, SHORTNESS OF BREATH, EXCESSIVE BLEEDING, SIGNS OF STROKE, OR CHANGES IN MENTAL STATUS YOU SHOULD CALL 911 IMMEDIATELY.     If your provider has prescribed aspirin and/or clopidogrel (Plavix), or prasugrel (Effient), or ticagrelor (Brilinta), DO NOT STOP THESE MEDICATIONS for any reason without talking to your cardiologist first. If any of these were prescribed, you must take them every day without missing a single dose. If you are getting low on these medications, contact your provider immediately for a refill.     FOR NEXT 24 HOURS  - Upon discharge, you should return home and rest for the remainder of the day and evening. You do not have to stay on bed rest but should not be very active.  It is recommended a responsible adult be with you for the first 24 hours after the procedure.    - No driving for 24 hours after procedure. Please arrange for someone to drive you home from the hospital today.     - Do not drive, operate machinery, or use power tools for 24 hours after your procedure.     - Do not make any legal decisions for 24 hours after your procedure.     - Do not drink alcoholic beverages for 24 hours after your procedure.    WOUND CARE   *FOR FEMORAL (LEG) ACCESS*  ·      Avoid heavy lifting (over 10 pounds) for 7 days, squatting or excessive bending for 2 days, and strenuous exercise for 7 days.  ·      No submerged bathing, swimming, or hot tubs for the next 7 days, or until fully healed.  ·      Avoid sexual activity for 3-4 days until any groin discomfort has ceased.     *FOR RADIAL (WRIST) ACCESS*  ·      No lifting more than 5 pounds or excessive use of the wrist for 24 hours - for example, treat your wrist as if it is sprained.  ·      Do not engage in vigorous activities (tennis, golf, bowling, weights) for at least 48 hours after the procedure.  ·      Do not submerge the wrist  for 7 days after the procedure.  ·      You should expect mild tingling in your hand and tenderness at the puncture site for up to 3 days.    - The transparent dressing should be removed from the site 24 hours after the procedure.  Wash the site gently with soap and water. Rinse well and pat dry. Keep the area clean and dry. You may apply a Band-Aid to the site. Avoid lotions, ointments, or powders until fully healed.     - You may shower the day after your procedure.      - It is normal to notice a small bruise around the puncture site and/or a small grape sized or smaller lump. Any large bruising or large lump warrants a call to the office.     - If bleeding should occur, lay down and apply pressure to the affected area for 10 minutes.  If the bleeding stops notify your physician.  If there is a large amount of bleeding or spurting of blood CALL 911 immediately.  DO NOT drive yourself to the hospital.    - You may experience some tenderness, bruising or minimal inflammation.  If you have any concerns, you may contact the Cath Lab or if any of these symptoms become excessive, contact your cardiologist or go to the emergency room.     - You can resume your regular diet right after your procedure. We always recommend a heart healthy diet with lots of fruits, vegetables and lean meats; low fat.     OTHER INSTRUCTIONS  - You may take acetaminophen (Tylenol) as directed for discomfort.  If pain is not relieved with acetaminophen (Tylenol), contact your doctor.    - If you notice or experience any of the following, you should notify your doctor or seek medical attention  Chest pain or discomfort  Change in mental status or weakness in extremities.  Dizziness, light headedness, or feeling faint.  Change in the site where the procedure was performed, such as bleeding or an increased area of bruising or swelling.  Tingling, numbness, pain, or coolness in the leg/arm beyond the site where the procedure was performed.  Signs  of infection (i.e. shaking chills, temperature > 100 degrees Fahrenheit, warmth, redness) in the leg/arm area where the procedure was performed.  Changes in urination   Bloody or black stools  Vomiting blood  Severe nose bleeds  Any excessive bleeding    - If you DO NOT have an appointment with your cardiologist within 2-4 weeks following your procedure, please contact their office.

## 2025-06-18 NOTE — INTERVAL H&P NOTE
H&P reviewed. The patient was examined and there are no changes to the H&P. Denies CP, SOB, dizziness, lightheadedness, orthopnea.  Only reports fatigue with activity.

## 2025-06-26 ENCOUNTER — TELEPHONE (OUTPATIENT)
Dept: CARDIOLOGY | Facility: HOSPITAL | Age: 80
End: 2025-06-26
Payer: MEDICARE

## 2025-06-26 ENCOUNTER — CARDIOLOGY CONFERENCE (OUTPATIENT)
Dept: CARDIOLOGY | Facility: HOSPITAL | Age: 80
End: 2025-06-26
Payer: MEDICARE

## 2025-06-26 DIAGNOSIS — I35.0 NONRHEUMATIC AORTIC (VALVE) STENOSIS: Primary | ICD-10-CM

## 2025-06-26 DIAGNOSIS — Z00.6 RESEARCH EXAM: Primary | ICD-10-CM

## 2025-06-26 NOTE — PROGRESS NOTES
Valve and Structural Heart Multidisciplinary Meeting   This note reflects a summary of a case conference discussion between a multidisciplinary team of interventional cardiologists, cardiac surgeons, APPs, nurse navigators, and research team.  The suggestions and impressions in this note reflect group consensus opinion but do not substitute bedside evaluation and management by the primary team.     Attendees:  Dr. Emmanuel, Dr. Armstrong, Litzy Mcgee, Sobeida Amaya, Margie Hutton, Lakisha Mccrary    Montana Gagnon is a 80 y.o. year old male  Medical record number: [id number]    Referring Provider Dr. Stover    Brief Clinical Summary - clinical presentation/comorbidities:  80 y.o. male with PMH of HTN, HLD, s/p knee replacement surgery 6 weeks ago presents for evaluation of severe, mildly symptomatic aortic stenosis.  Patient relates worsening fatigue, needing to take breaks when walking long distance or exercising on bike but denies SOB, PETERSON.  Denies overt orthopnea, PND, exertional CP. Denies syncope or presyncope.  Denies increased abdominal girth, edema.    Valve and Structural Heart Work Up  NYHA II  Frailty 0/5  STS Isolated AVR Operative Mortality 1.4%  EKG NSR, 188ms NM, nl axis, 86ms QRS  TTE  55% EF , severe aortic stenosis, AV gradient 65/41, AV Vmax 4.02 (progressed from 3.02 one year ago), FRANCESCA 0.9, DI 0.2, mild AI.  Cath 06/18/25 Dr. Clifford   CTA TAVR 06/12/25  Dental- regular dentist visits q6 months, no issues.    Group consensus recommendation:   Case reviewed. Imaging reviewed.  CT read- mild calcification LVOT which is not prohibitive. Raphe seen between R and left. Bicuspid type 1.  Sinus 34 STJ 36 arsenio 84.8 area 560  Evolut 34 vs Resilia 29.  Potential; MRI study candidate.  Will determine valve depending on inclusion in study. Proceed with TAVR.  To contact the  [facility] contact  Transfer Center or the office [phone].

## 2025-06-26 NOTE — TELEPHONE ENCOUNTER
KCCQ/WALK Questionnaire      1  Heart failure affects different people in different ways. Some feel shortness of breath while others feel fatigue. Please indicate how much you are limited by heart failure (shortness of breath or fatigue) in your ability to do the following activities over the past 2 weeks.     A.) Showering/bathing  5. Not at All  B.) Walking 1 block on level ground 3. Moderately  C.) Hurrying or Jogging   6. Limited for other reastons    2.  Over the past 2 weeks, how many times did you have swelling in your feet, ankles or legs when you woke up in the morning? 5. Never    3.  Over the past 2 weeks, on average, how many times has fatigue limited your ability to do what you wanted? 4. 3 or more times a week but not every day    4.  Over the past 2 weeks, on average, how many times has shortness of breath limited your ability to do what you wanted? 5. 1-2 times a week    5.  Over the past 2 weeks, on average, how many times have you been forced to sleep sitting up in a chair or with at least 3 pillows to prop you up because of shortness of breath? Never    6. Over the past 2 weeks, how much has your heart failure limited your enjoyment of life? It has not limited my enjoyment of life    7. If you had to spend the rest of your life with your heart failure the way it is right now, how would you feel about this? 1. Not at all     8. How much does your heart failure affect your lifestyle? Please indicate how your heart failure may have limited yourparticipation in the following activities over the past 2 weeks    A.)  Hobbies, recreational activities  4. Slightly limited    B.) Working or doing household chores  4. Slightly limited    C.) Visiting family or friends out of your home  5. Did not limit at all      5m walk:  12.13 seconds

## 2025-06-26 NOTE — TELEPHONE ENCOUNTER
Called & notified ct abnormality from 6-12-25 to follow up with his PCP, Dr. Irizarry. He agreed to follow up. Will fax report to office.

## 2025-06-30 ENCOUNTER — APPOINTMENT (OUTPATIENT)
Dept: RADIOLOGY | Facility: HOSPITAL | Age: 80
End: 2025-06-30
Payer: MEDICARE

## 2025-07-07 ENCOUNTER — OFFICE VISIT (OUTPATIENT)
Dept: ORTHOPEDIC SURGERY | Facility: HOSPITAL | Age: 80
End: 2025-07-07
Payer: MEDICARE

## 2025-07-07 ENCOUNTER — HOSPITAL ENCOUNTER (OUTPATIENT)
Dept: RADIOLOGY | Facility: HOSPITAL | Age: 80
Discharge: HOME | End: 2025-07-07
Payer: MEDICARE

## 2025-07-07 DIAGNOSIS — Z96.652 STATUS POST TOTAL LEFT KNEE REPLACEMENT: ICD-10-CM

## 2025-07-07 DIAGNOSIS — Z96.652 STATUS POST TOTAL LEFT KNEE REPLACEMENT: Primary | ICD-10-CM

## 2025-07-07 PROCEDURE — 99212 OFFICE O/P EST SF 10 MIN: CPT | Performed by: STUDENT IN AN ORGANIZED HEALTH CARE EDUCATION/TRAINING PROGRAM

## 2025-07-07 PROCEDURE — 1125F AMNT PAIN NOTED PAIN PRSNT: CPT | Performed by: STUDENT IN AN ORGANIZED HEALTH CARE EDUCATION/TRAINING PROGRAM

## 2025-07-07 PROCEDURE — 1159F MED LIST DOCD IN RCRD: CPT | Performed by: STUDENT IN AN ORGANIZED HEALTH CARE EDUCATION/TRAINING PROGRAM

## 2025-07-07 PROCEDURE — 99024 POSTOP FOLLOW-UP VISIT: CPT | Performed by: STUDENT IN AN ORGANIZED HEALTH CARE EDUCATION/TRAINING PROGRAM

## 2025-07-07 PROCEDURE — 73562 X-RAY EXAM OF KNEE 3: CPT | Mod: LEFT SIDE | Performed by: STUDENT IN AN ORGANIZED HEALTH CARE EDUCATION/TRAINING PROGRAM

## 2025-07-07 PROCEDURE — G2211 COMPLEX E/M VISIT ADD ON: HCPCS | Performed by: STUDENT IN AN ORGANIZED HEALTH CARE EDUCATION/TRAINING PROGRAM

## 2025-07-07 PROCEDURE — 73562 X-RAY EXAM OF KNEE 3: CPT | Mod: LT

## 2025-07-07 ASSESSMENT — PAIN DESCRIPTION - DESCRIPTORS: DESCRIPTORS: DISCOMFORT

## 2025-07-07 ASSESSMENT — PAIN - FUNCTIONAL ASSESSMENT: PAIN_FUNCTIONAL_ASSESSMENT: 0-10

## 2025-07-07 ASSESSMENT — PAIN SCALES - GENERAL: PAINLEVEL_OUTOF10: 2

## 2025-07-07 NOTE — H&P (VIEW-ONLY)
" Corie Barclay MD   Adult Reconstruction and Joint Replacement Surgery  Phone: [phone]     Fax: [phone]       Name: Montana Gagnon  Age: 80 y.o.   : 1945   Date of Visit: 2025      FOLLOW UP    Procedure: Left Total knee replacement  Date: 25     Chief Complaint: Left Total knee replacement surgery follow-up    History of Present Illness:  The patient is now 12 weeks 6 days out from left Total knee replacement surgery.     He unfortunately has increased fatigue and breathlessness given aortic stenosis that has worsened.  He has an upcoming TAVR in 11 days.  He has less of his physical therapy efforts due to this.  He has been able to maintain his range of motion.    The patient has mild or occasional pain and pain with stairs.    Currently taking nothing for pain.     The patient has intermediate stiffness.     Patient is walking with nothing for assistive device.    The patient goes up and down stairs normally.    Patient can walk 2-3 blocks.    The patient is doing no current physical therapy due to upcoming TAVR.    No fevers or drainage from the incision.    There are no concerns.    The patient is mildly limited.     Physical Exam:  The patient is well appearing, alert and oriented to person place and time.    The incision is well healed. There is no sign of wound complication.    Range of Motion: full extension to 128 degrees of flexion.    There is a 0 degree extensor lag.    There is a small effusion in the knee.    There is no instability. The knee is stable to varus-valgus stress and anterior-posterior stress.     Homans sign is negative.    Neurologic, and vascular examinations are normal.    PROMs   03/17/25 05/10/25   \"KOOSJR. Score\" 70.7 73.34       Imaging:    X-rays were personally reviewed today and show implants in good position with no evidence of complication.    Impression and Plan:    This patient is 12 weeks 6 days from left Total knee replacement.    The " patient is doing well following Total knee replacement surgery.  Antibiotic prophylaxis prior to dental procedures were reviewed.  Long term failure mechanisms were reviewed. Discussed importance of long term follow up.  The patient was asked to contact the office sooner if there are any concerns. Their questions were answered.     RTC: 1 year or as needed in the interim    X-rays at next visit: Postop TKA series    _____________________  Corie Barclay MD   Attending Orthopaedic Surgeon  [facility]    [facility]    This office note was transcribed with dictation software.  Please excuse any typographical errors, program misunderstandings leading to inadvertent insertions or deletions of inappropriate wording, pronoun errors and other unintentional transcription errors not noticed on proof-reading.

## 2025-07-07 NOTE — PROGRESS NOTES
" Corie Barclay MD   Adult Reconstruction and Joint Replacement Surgery  Phone: [phone]     Fax: [phone]       Name: Montana Gagnon  Age: 80 y.o.   : 1945   Date of Visit: 2025      FOLLOW UP    Procedure: Left Total knee replacement  Date: 25     Chief Complaint: Left Total knee replacement surgery follow-up    History of Present Illness:  The patient is now 12 weeks 6 days out from left Total knee replacement surgery.     He unfortunately has increased fatigue and breathlessness given aortic stenosis that has worsened.  He has an upcoming TAVR in 11 days.  He has less of his physical therapy efforts due to this.  He has been able to maintain his range of motion.    The patient has mild or occasional pain and pain with stairs.    Currently taking nothing for pain.     The patient has intermediate stiffness.     Patient is walking with nothing for assistive device.    The patient goes up and down stairs normally.    Patient can walk 2-3 blocks.    The patient is doing no current physical therapy due to upcoming TAVR.    No fevers or drainage from the incision.    There are no concerns.    The patient is mildly limited.     Physical Exam:  The patient is well appearing, alert and oriented to person place and time.    The incision is well healed. There is no sign of wound complication.    Range of Motion: full extension to 128 degrees of flexion.    There is a 0 degree extensor lag.    There is a small effusion in the knee.    There is no instability. The knee is stable to varus-valgus stress and anterior-posterior stress.     Homans sign is negative.    Neurologic, and vascular examinations are normal.    PROMs   03/17/25 05/10/25   \"KOOSJR. Score\" 70.7 73.34       Imaging:    X-rays were personally reviewed today and show implants in good position with no evidence of complication.    Impression and Plan:    This patient is 12 weeks 6 days from left Total knee replacement.    The " patient is doing well following Total knee replacement surgery.  Antibiotic prophylaxis prior to dental procedures were reviewed.  Long term failure mechanisms were reviewed. Discussed importance of long term follow up.  The patient was asked to contact the office sooner if there are any concerns. Their questions were answered.     RTC: 1 year or as needed in the interim    X-rays at next visit: Postop TKA series    _____________________  Corie Barclay MD   Attending Orthopaedic Surgeon  [facility]    [facility]    This office note was transcribed with dictation software.  Please excuse any typographical errors, program misunderstandings leading to inadvertent insertions or deletions of inappropriate wording, pronoun errors and other unintentional transcription errors not noticed on proof-reading.

## 2025-07-08 LAB
ANION GAP SERPL CALCULATED.4IONS-SCNC: 12 MMOL/L (CALC) (ref 7–17)
BUN SERPL-MCNC: 15 MG/DL (ref 7–25)
BUN/CREAT SERPL: NORMAL (CALC) (ref 6–22)
CALCIUM SERPL-MCNC: 9.5 MG/DL (ref 8.6–10.3)
CHLORIDE SERPL-SCNC: 103 MMOL/L (ref 98–110)
CO2 SERPL-SCNC: 25 MMOL/L (ref 20–32)
CREAT SERPL-MCNC: 0.93 MG/DL (ref 0.7–1.22)
EGFRCR SERPLBLD CKD-EPI 2021: 82 ML/MIN/1.73M2
ERYTHROCYTE [DISTWIDTH] IN BLOOD BY AUTOMATED COUNT: 12.8 % (ref 11–15)
GLUCOSE SERPL-MCNC: 101 MG/DL (ref 65–139)
HCT VFR BLD AUTO: 43.4 % (ref 38.5–50)
HGB BLD-MCNC: 14 G/DL (ref 13.2–17.1)
INR PPP: 0.9
MCH RBC QN AUTO: 30.6 PG (ref 27–33)
MCHC RBC AUTO-ENTMCNC: 32.3 G/DL (ref 32–36)
MCV RBC AUTO: 94.8 FL (ref 80–100)
PLATELET # BLD AUTO: 228 THOUSAND/UL (ref 140–400)
PMV BLD REES-ECKER: 9.9 FL (ref 7.5–12.5)
POTASSIUM SERPL-SCNC: 3.7 MMOL/L (ref 3.5–5.3)
PROTHROMBIN TIME: 10.2 SEC (ref 9–11.5)
RBC # BLD AUTO: 4.58 MILLION/UL (ref 4.2–5.8)
SODIUM SERPL-SCNC: 140 MMOL/L (ref 135–146)
WBC # BLD AUTO: 9.6 THOUSAND/UL (ref 3.8–10.8)

## 2025-07-15 PROBLEM — Z95.2 S/P TAVR (TRANSCATHETER AORTIC VALVE REPLACEMENT): Status: ACTIVE | Noted: 2025-07-15

## 2025-07-15 NOTE — DISCHARGE INSTRUCTIONS
####  POST VALVE PROCEDURE DISCHARGE INSTRUCTIONS   ####      MONITORING - Vitals/Weights    Weigh yourself daily (should be first thing in A.M.)   Take/record your heart rate and blood pressure 2 times a day (at least 60-90 mins AFTER you take your meds)  Please keep a written record of all of your vtials and have these readings available at follow-up appointments      Activity Restrictions    NO DRIVING - for 1 WEEK from your procedure date   NO LIFTING/PULLING/PUSHING GREATER THAN 10 POUNDS for 1 WEEK from your procedure date       NEEDED Post-procedure labs/test    CBC and BMP (blood draw) 4-7 days post discharge.  You do NOT need to fast for these.    Echocardiogram (1 month, must be between 23-75 days)  Please call and schedule this ECHO at your LOCAL center      VALVE TEAM FOLLOW-UP    IF SCHEDULING ALLOWED, a 1 week follow-up appointment with a Structural Heart NP has been scheduled for you    The 1 month and 1 year follow-up appointments with one of the a Structural Heart NP's (listed as [facility]), has already been scheduled for you.  (See appointments)    *-*-*  IF YOU NEED TO CHANGE YOUR APPOINTMENT TIME/DATE, PLEASE CALL [phone]     Please make the following appointments:  PCP within 7-14 days of discharge  Primary Cardiologist in 6-10 weeks        DENTAL    **** No elective dental procedures or cleanings for 3 months post procedure - You will need dental prophylaxis (oral antibiotic) prior to dental work/cleanings for life *****    Please call the STRUCTURAL HEART TEAM LINE if you have any questions or concerns - [phone] (M-F 9am-4pm)  On-Call Cardiology Fellow: [phone] (ONLY for urgent issues on nights/weekends/holidays)      ****   CALL PROVIDER IF:   ****  - Breathing faster than normal.   - Breathing harder than normal or having retractions.   - Fever of 100.4 F (38 C) or higher.   - Chills  - Drinking less than normal.   - Urinating less than normal, over 1 day.   - Acting very  sleepy and difficult to awaken.   - Vomiting (throwing up) and not able to eat or drink for 12 hours.   - 3 or more loose, watery bowel movements in 24 hours (diarrhea).    -Any new concerning symptoms.    - If you develop difficulty breathing, rash, hives, severe nausea, vomiting, light-headedness or any signs of infection, immediately contact your doctor and go to the nearest emergency room.      #####   MISC. HOME-GOING INFO   #####  - DO NOT drink any alcoholic drinks or take any non-prescriptive medications that contain alcohol for the first 24 hours.   - DO NOT make any important decisions for the first 24 hours.      ACTIVITY:  - You are advised to go directly home from the hospital.   - DO NOT lift anything heavier than 10 pounds for one week, this allows for proper healing of the groin.   - No excessive exercise or treadmill use for one week. You may walk and do stairs, slowly.   - No sexual activities for 24 hours after you arrive home.      WOUND CARE:  - If slight bleeding should occur at site, lie down and have someone apply firm pressure just above the puncture site for 5 minutes.  If it continues or is profuse, call 911. Always notify your doctor if bleeding occurs.   - Keep site clean and dry. Let air dry or you may use a simple bandaid.   - Gently cleanse the puncture site in your groin with soap and water only.   - You may experience some tenderness, bruising or minimal inflammation.  If you have any concerns, you may contact the Cath Lab or if any of these symptoms become excessive, contact your cardiologist or go to the emergency room.   - No tub baths, soaking, or swimming for one week.   - May shower the next day after your procedure.      DIET:  - You may resume your normal diet. However, be mindful of your sodium intake.  Ideally you should try to limit your daily intake of sodium to 2-3g a day      HEART FAILURE SPECIFIC INSTRUCTIONS:   - CALL 911 IF YOU HAVE ANY OF THE SIGNS AND SYMPTOMS OF  HEART FAILURE:   1. Chest pain   2. Significant Shortness of breath   3. Fainting.   - Notify your physician immediately if you have shortness of breath; weight gain of 3 lbs. or more; fatigue and loss of energy; swelling of lower extremities or abdomen; dizziness or fainting; change of appetite; and frequent coughing.   - Daily weight on the same scale, same time after voiding and before eating.   - Maintain daily weight log.

## 2025-07-16 RX ORDER — ASPIRIN 81 MG/1
81 TABLET ORAL DAILY
COMMUNITY

## 2025-07-18 ENCOUNTER — HOSPITAL ENCOUNTER (INPATIENT)
Facility: HOSPITAL | Age: 80
LOS: 1 days | Discharge: HOME | DRG: 267 | End: 2025-07-19
Attending: INTERNAL MEDICINE | Admitting: INTERNAL MEDICINE
Payer: MEDICARE

## 2025-07-18 ENCOUNTER — HOSPITAL ENCOUNTER (OUTPATIENT)
Dept: RADIOLOGY | Facility: HOSPITAL | Age: 80
Discharge: HOME | End: 2025-07-18
Payer: MEDICARE

## 2025-07-18 ENCOUNTER — APPOINTMENT (OUTPATIENT)
Dept: CARDIOLOGY | Facility: HOSPITAL | Age: 80
DRG: 267 | End: 2025-07-18
Payer: MEDICARE

## 2025-07-18 ENCOUNTER — APPOINTMENT (OUTPATIENT)
Dept: RADIOLOGY | Facility: HOSPITAL | Age: 80
DRG: 267 | End: 2025-07-18
Payer: MEDICARE

## 2025-07-18 VITALS — WEIGHT: 167.33 LBS | HEIGHT: 69 IN | BODY MASS INDEX: 24.78 KG/M2

## 2025-07-18 DIAGNOSIS — Z00.6 RESEARCH EXAM: ICD-10-CM

## 2025-07-18 DIAGNOSIS — I35.0 SEVERE AORTIC STENOSIS: ICD-10-CM

## 2025-07-18 DIAGNOSIS — Z95.2 S/P TAVR (TRANSCATHETER AORTIC VALVE REPLACEMENT): Primary | ICD-10-CM

## 2025-07-18 DIAGNOSIS — I35.0 NONRHEUMATIC AORTIC (VALVE) STENOSIS: ICD-10-CM

## 2025-07-18 LAB
ABO GROUP (TYPE) IN BLOOD: NORMAL
ABO GROUP (TYPE) IN BLOOD: NORMAL
ANION GAP SERPL CALC-SCNC: 11 MMOL/L (ref 10–20)
ANTIBODY SCREEN: NORMAL
AORTIC VALVE MEAN GRADIENT: 29 MMHG
AORTIC VALVE PEAK VELOCITY: 3.23 M/S
AV PEAK GRADIENT: 42 MMHG
AVA (PEAK VEL): 0.98 CM2
AVA (VTI): 0.77 CM2
BASOPHILS # BLD AUTO: 0.03 X10*3/UL (ref 0–0.1)
BASOPHILS NFR BLD AUTO: 0.4 %
BUN SERPL-MCNC: 12 MG/DL (ref 6–23)
CALCIUM SERPL-MCNC: 8.5 MG/DL (ref 8.6–10.6)
CHLORIDE SERPL-SCNC: 106 MMOL/L (ref 98–107)
CO2 SERPL-SCNC: 26 MMOL/L (ref 21–32)
CREAT SERPL-MCNC: 0.84 MG/DL (ref 0.5–1.3)
EGFRCR SERPLBLD CKD-EPI 2021: 88 ML/MIN/1.73M*2
EJECTION FRACTION APICAL 4 CHAMBER: 62.7
EJECTION FRACTION: 58 %
EOSINOPHIL # BLD AUTO: 0.15 X10*3/UL (ref 0–0.4)
EOSINOPHIL NFR BLD AUTO: 2 %
ERYTHROCYTE [DISTWIDTH] IN BLOOD BY AUTOMATED COUNT: 11.9 % (ref 11.5–14.5)
GLUCOSE BLD MANUAL STRIP-MCNC: 102 MG/DL (ref 74–99)
GLUCOSE BLD MANUAL STRIP-MCNC: 134 MG/DL (ref 74–99)
GLUCOSE SERPL-MCNC: 87 MG/DL (ref 74–99)
HCT VFR BLD AUTO: 37 % (ref 41–52)
HGB BLD-MCNC: 11.7 G/DL (ref 13.5–17.5)
IMM GRANULOCYTES # BLD AUTO: 0.02 X10*3/UL (ref 0–0.5)
IMM GRANULOCYTES NFR BLD AUTO: 0.3 % (ref 0–0.9)
LEFT VENTRICLE INTERNAL DIMENSION DIASTOLE: 5.4 CM (ref 3.5–6)
LEFT VENTRICULAR OUTFLOW TRACT DIAMETER: 2.1 CM
LYMPHOCYTES # BLD AUTO: 2.5 X10*3/UL (ref 0.8–3)
LYMPHOCYTES NFR BLD AUTO: 33.9 %
MAGNESIUM SERPL-MCNC: 1.97 MG/DL (ref 1.6–2.4)
MCH RBC QN AUTO: 29.3 PG (ref 26–34)
MCHC RBC AUTO-ENTMCNC: 31.6 G/DL (ref 32–36)
MCV RBC AUTO: 93 FL (ref 80–100)
MONOCYTES # BLD AUTO: 0.57 X10*3/UL (ref 0.05–0.8)
MONOCYTES NFR BLD AUTO: 7.7 %
NEUTROPHILS # BLD AUTO: 4.1 X10*3/UL (ref 1.6–5.5)
NEUTROPHILS NFR BLD AUTO: 55.7 %
NRBC BLD-RTO: 0 /100 WBCS (ref 0–0)
PLATELET # BLD AUTO: 173 X10*3/UL (ref 150–450)
POTASSIUM SERPL-SCNC: 3.1 MMOL/L (ref 3.5–5.3)
RBC # BLD AUTO: 3.99 X10*6/UL (ref 4.5–5.9)
RH FACTOR (ANTIGEN D): NORMAL
RH FACTOR (ANTIGEN D): NORMAL
SODIUM SERPL-SCNC: 140 MMOL/L (ref 136–145)
WBC # BLD AUTO: 7.4 X10*3/UL (ref 4.4–11.3)

## 2025-07-18 PROCEDURE — 2500000004 HC RX 250 GENERAL PHARMACY W/ HCPCS (ALT 636 FOR OP/ED): Performed by: INTERNAL MEDICINE

## 2025-07-18 PROCEDURE — 93005 ELECTROCARDIOGRAM TRACING: CPT

## 2025-07-18 PROCEDURE — 33361 REPLACE AORTIC VALVE PERQ: CPT | Performed by: INTERNAL MEDICINE

## 2025-07-18 PROCEDURE — 85347 COAGULATION TIME ACTIVATED: CPT | Performed by: INTERNAL MEDICINE

## 2025-07-18 PROCEDURE — C1887 CATHETER, GUIDING: HCPCS | Performed by: INTERNAL MEDICINE

## 2025-07-18 PROCEDURE — 33361 REPLACE AORTIC VALVE PERQ: CPT | Performed by: STUDENT IN AN ORGANIZED HEALTH CARE EDUCATION/TRAINING PROGRAM

## 2025-07-18 PROCEDURE — 93308 TTE F-UP OR LMTD: CPT

## 2025-07-18 PROCEDURE — G0269 OCCLUSIVE DEVICE IN VEIN ART: HCPCS | Performed by: INTERNAL MEDICINE

## 2025-07-18 PROCEDURE — 93308 TTE F-UP OR LMTD: CPT | Performed by: SPECIALIST

## 2025-07-18 PROCEDURE — C1769 GUIDE WIRE: HCPCS | Performed by: INTERNAL MEDICINE

## 2025-07-18 PROCEDURE — C1725 CATH, TRANSLUMIN NON-LASER: HCPCS | Performed by: INTERNAL MEDICINE

## 2025-07-18 PROCEDURE — 2500000001 HC RX 250 WO HCPCS SELF ADMINISTERED DRUGS (ALT 637 FOR MEDICARE OP): Performed by: INTERNAL MEDICINE

## 2025-07-18 PROCEDURE — 86900 BLOOD TYPING SEROLOGIC ABO: CPT | Performed by: NURSE PRACTITIONER

## 2025-07-18 PROCEDURE — C1889 IMPLANT/INSERT DEVICE, NOC: HCPCS | Performed by: INTERNAL MEDICINE

## 2025-07-18 PROCEDURE — 02RF38Z REPLACEMENT OF AORTIC VALVE WITH ZOOPLASTIC TISSUE, PERCUTANEOUS APPROACH: ICD-10-PCS | Performed by: STUDENT IN AN ORGANIZED HEALTH CARE EDUCATION/TRAINING PROGRAM

## 2025-07-18 PROCEDURE — 75561 CARDIAC MRI FOR MORPH W/DYE: CPT

## 2025-07-18 PROCEDURE — 99153 MOD SED SAME PHYS/QHP EA: CPT | Performed by: INTERNAL MEDICINE

## 2025-07-18 PROCEDURE — 2550000001 HC RX 255 CONTRASTS: Performed by: INTERNAL MEDICINE

## 2025-07-18 PROCEDURE — 93321 DOPPLER ECHO F-UP/LMTD STD: CPT | Performed by: SPECIALIST

## 2025-07-18 PROCEDURE — 83735 ASSAY OF MAGNESIUM: CPT | Performed by: NURSE PRACTITIONER

## 2025-07-18 PROCEDURE — 93325 DOPPLER ECHO COLOR FLOW MAPG: CPT | Performed by: SPECIALIST

## 2025-07-18 PROCEDURE — 2780000003 HC OR 278 NO HCPCS: Performed by: INTERNAL MEDICINE

## 2025-07-18 PROCEDURE — 02HV33Z INSERTION OF INFUSION DEVICE INTO SUPERIOR VENA CAVA, PERCUTANEOUS APPROACH: ICD-10-PCS | Performed by: STUDENT IN AN ORGANIZED HEALTH CARE EDUCATION/TRAINING PROGRAM

## 2025-07-18 PROCEDURE — 82947 ASSAY GLUCOSE BLOOD QUANT: CPT

## 2025-07-18 PROCEDURE — 4A023N7 MEASUREMENT OF CARDIAC SAMPLING AND PRESSURE, LEFT HEART, PERCUTANEOUS APPROACH: ICD-10-PCS | Performed by: STUDENT IN AN ORGANIZED HEALTH CARE EDUCATION/TRAINING PROGRAM

## 2025-07-18 PROCEDURE — 85025 COMPLETE CBC W/AUTO DIFF WBC: CPT | Performed by: NURSE PRACTITIONER

## 2025-07-18 PROCEDURE — 2720000007 HC OR 272 NO HCPCS: Performed by: INTERNAL MEDICINE

## 2025-07-18 PROCEDURE — 80048 BASIC METABOLIC PNL TOTAL CA: CPT | Performed by: NURSE PRACTITIONER

## 2025-07-18 PROCEDURE — 71045 X-RAY EXAM CHEST 1 VIEW: CPT

## 2025-07-18 PROCEDURE — 76937 US GUIDE VASCULAR ACCESS: CPT | Performed by: INTERNAL MEDICINE

## 2025-07-18 PROCEDURE — 71045 X-RAY EXAM CHEST 1 VIEW: CPT | Performed by: RADIOLOGY

## 2025-07-18 PROCEDURE — 2500000005 HC RX 250 GENERAL PHARMACY W/O HCPCS: Performed by: NURSE PRACTITIONER

## 2025-07-18 PROCEDURE — 99152 MOD SED SAME PHYS/QHP 5/>YRS: CPT | Performed by: INTERNAL MEDICINE

## 2025-07-18 PROCEDURE — C1756 CATH, PACING, TRANSESOPH: HCPCS | Performed by: INTERNAL MEDICINE

## 2025-07-18 PROCEDURE — 1200000002 HC GENERAL ROOM WITH TELEMETRY DAILY

## 2025-07-18 PROCEDURE — C1894 INTRO/SHEATH, NON-LASER: HCPCS | Performed by: INTERNAL MEDICINE

## 2025-07-18 PROCEDURE — A9575 INJ GADOTERATE MEGLUMI 0.1ML: HCPCS | Performed by: INTERNAL MEDICINE

## 2025-07-18 PROCEDURE — C1760 CLOSURE DEV, VASC: HCPCS | Performed by: INTERNAL MEDICINE

## 2025-07-18 DEVICE — VALVE, AORTIC, 34MM, EVOLUT FX  TRANSCATHETER: Type: IMPLANTABLE DEVICE | Site: AORTA | Status: FUNCTIONAL

## 2025-07-18 DEVICE — DELIV SYS D-EVOLUTFX-34
Type: IMPLANTABLE DEVICE | Site: HEART | Status: NON-FUNCTIONAL
Brand: EVOLUT™ FX

## 2025-07-18 RX ORDER — FENTANYL CITRATE 50 UG/ML
INJECTION, SOLUTION INTRAMUSCULAR; INTRAVENOUS AS NEEDED
Status: DISCONTINUED | OUTPATIENT
Start: 2025-07-18 | End: 2025-07-18 | Stop reason: HOSPADM

## 2025-07-18 RX ORDER — ROSUVASTATIN CALCIUM 20 MG/1
20 TABLET, COATED ORAL DAILY
Status: DISCONTINUED | OUTPATIENT
Start: 2025-07-18 | End: 2025-07-19 | Stop reason: HOSPADM

## 2025-07-18 RX ORDER — CEFAZOLIN 1 G/1
INJECTION, POWDER, FOR SOLUTION INTRAVENOUS AS NEEDED
Status: DISCONTINUED | OUTPATIENT
Start: 2025-07-18 | End: 2025-07-18 | Stop reason: HOSPADM

## 2025-07-18 RX ORDER — HEPARIN SODIUM 1000 [USP'U]/ML
INJECTION, SOLUTION INTRAVENOUS; SUBCUTANEOUS AS NEEDED
Status: DISCONTINUED | OUTPATIENT
Start: 2025-07-18 | End: 2025-07-18 | Stop reason: HOSPADM

## 2025-07-18 RX ORDER — LIDOCAINE HYDROCHLORIDE 20 MG/ML
INJECTION, SOLUTION INFILTRATION; PERINEURAL AS NEEDED
Status: DISCONTINUED | OUTPATIENT
Start: 2025-07-18 | End: 2025-07-18 | Stop reason: HOSPADM

## 2025-07-18 RX ORDER — ACETAMINOPHEN 160 MG/5ML
650 SOLUTION ORAL EVERY 6 HOURS PRN
Status: DISCONTINUED | OUTPATIENT
Start: 2025-07-18 | End: 2025-07-19 | Stop reason: HOSPADM

## 2025-07-18 RX ORDER — NAPROXEN SODIUM 220 MG/1
324 TABLET, FILM COATED ORAL ONCE
Status: DISCONTINUED | OUTPATIENT
Start: 2025-07-18 | End: 2025-07-19 | Stop reason: HOSPADM

## 2025-07-18 RX ORDER — LIDOCAINE HYDROCHLORIDE AND EPINEPHRINE 10; 20 UG/ML; MG/ML
5 INJECTION, SOLUTION INFILTRATION; PERINEURAL ONCE
Status: DISCONTINUED | OUTPATIENT
Start: 2025-07-18 | End: 2025-07-19 | Stop reason: HOSPADM

## 2025-07-18 RX ORDER — CEFAZOLIN SODIUM 2 G/50ML
2 SOLUTION INTRAVENOUS ONCE
Status: DISCONTINUED | OUTPATIENT
Start: 2025-07-18 | End: 2025-07-19 | Stop reason: HOSPADM

## 2025-07-18 RX ORDER — NAPROXEN SODIUM 220 MG/1
81 TABLET, FILM COATED ORAL DAILY
Status: DISCONTINUED | OUTPATIENT
Start: 2025-07-18 | End: 2025-07-19 | Stop reason: HOSPADM

## 2025-07-18 RX ORDER — ONDANSETRON 4 MG/1
4 TABLET, FILM COATED ORAL EVERY 8 HOURS PRN
Status: DISCONTINUED | OUTPATIENT
Start: 2025-07-18 | End: 2025-07-19 | Stop reason: HOSPADM

## 2025-07-18 RX ORDER — ACETAMINOPHEN 325 MG/1
650 TABLET ORAL EVERY 6 HOURS PRN
Status: DISCONTINUED | OUTPATIENT
Start: 2025-07-18 | End: 2025-07-19 | Stop reason: HOSPADM

## 2025-07-18 RX ORDER — ONDANSETRON HYDROCHLORIDE 2 MG/ML
4 INJECTION, SOLUTION INTRAVENOUS EVERY 8 HOURS PRN
Status: DISCONTINUED | OUTPATIENT
Start: 2025-07-18 | End: 2025-07-19 | Stop reason: HOSPADM

## 2025-07-18 RX ORDER — PROTAMINE SULFATE 10 MG/ML
INJECTION, SOLUTION INTRAVENOUS CONTINUOUS PRN
Status: COMPLETED | OUTPATIENT
Start: 2025-07-18 | End: 2025-07-18

## 2025-07-18 RX ORDER — MIDAZOLAM HYDROCHLORIDE 1 MG/ML
INJECTION, SOLUTION INTRAMUSCULAR; INTRAVENOUS AS NEEDED
Status: DISCONTINUED | OUTPATIENT
Start: 2025-07-18 | End: 2025-07-18 | Stop reason: HOSPADM

## 2025-07-18 RX ORDER — LIDOCAINE HYDROCHLORIDE AND EPINEPHRINE 10; 10 UG/ML; MG/ML
INJECTION, SOLUTION INFILTRATION; PERINEURAL AS NEEDED
Status: DISCONTINUED | OUTPATIENT
Start: 2025-07-18 | End: 2025-07-18 | Stop reason: HOSPADM

## 2025-07-18 RX ORDER — AMOXICILLIN 250 MG
1 CAPSULE ORAL 2 TIMES DAILY
Status: DISCONTINUED | OUTPATIENT
Start: 2025-07-18 | End: 2025-07-19 | Stop reason: HOSPADM

## 2025-07-18 RX ORDER — ACETAMINOPHEN 650 MG/1
650 SUPPOSITORY RECTAL EVERY 6 HOURS PRN
Status: DISCONTINUED | OUTPATIENT
Start: 2025-07-18 | End: 2025-07-19 | Stop reason: HOSPADM

## 2025-07-18 RX ORDER — GADOTERATE MEGLUMINE 376.9 MG/ML
30 INJECTION INTRAVENOUS
Status: COMPLETED | OUTPATIENT
Start: 2025-07-18 | End: 2025-07-18

## 2025-07-18 RX ORDER — PANTOPRAZOLE SODIUM 40 MG/10ML
40 INJECTION, POWDER, LYOPHILIZED, FOR SOLUTION INTRAVENOUS
Status: DISCONTINUED | OUTPATIENT
Start: 2025-07-19 | End: 2025-07-19 | Stop reason: HOSPADM

## 2025-07-18 RX ORDER — LATANOPROST 50 UG/ML
1 SOLUTION/ DROPS OPHTHALMIC NIGHTLY
Status: DISCONTINUED | OUTPATIENT
Start: 2025-07-18 | End: 2025-07-19 | Stop reason: HOSPADM

## 2025-07-18 RX ORDER — ASPIRIN 325 MG
TABLET ORAL AS NEEDED
Status: DISCONTINUED | OUTPATIENT
Start: 2025-07-18 | End: 2025-07-18 | Stop reason: HOSPADM

## 2025-07-18 RX ORDER — TRAMADOL HYDROCHLORIDE 50 MG/1
50 TABLET, FILM COATED ORAL EVERY 6 HOURS PRN
Status: DISCONTINUED | OUTPATIENT
Start: 2025-07-18 | End: 2025-07-19 | Stop reason: HOSPADM

## 2025-07-18 RX ORDER — PANTOPRAZOLE SODIUM 40 MG/1
40 TABLET, DELAYED RELEASE ORAL
Status: DISCONTINUED | OUTPATIENT
Start: 2025-07-19 | End: 2025-07-19 | Stop reason: HOSPADM

## 2025-07-18 RX ADMIN — GADOTERATE MEGLUMINE 30 ML: 376.9 INJECTION INTRAVENOUS at 11:20

## 2025-07-18 RX ADMIN — Medication 21 PERCENT: at 20:00

## 2025-07-18 SDOH — ECONOMIC STABILITY: HOUSING INSECURITY: AT ANY TIME IN THE PAST 12 MONTHS, WERE YOU HOMELESS OR LIVING IN A SHELTER (INCLUDING NOW)?: NO

## 2025-07-18 SDOH — ECONOMIC STABILITY: HOUSING INSECURITY: IN THE LAST 12 MONTHS, WAS THERE A TIME WHEN YOU WERE NOT ABLE TO PAY THE MORTGAGE OR RENT ON TIME?: NO

## 2025-07-18 SDOH — SOCIAL STABILITY: SOCIAL INSECURITY
WITHIN THE LAST YEAR, HAVE YOU BEEN RAPED OR FORCED TO HAVE ANY KIND OF SEXUAL ACTIVITY BY YOUR PARTNER OR EX-PARTNER?: NO

## 2025-07-18 SDOH — ECONOMIC STABILITY: FOOD INSECURITY: WITHIN THE PAST 12 MONTHS, YOU WORRIED THAT YOUR FOOD WOULD RUN OUT BEFORE YOU GOT THE MONEY TO BUY MORE.: NEVER TRUE

## 2025-07-18 SDOH — ECONOMIC STABILITY: HOUSING INSECURITY: IN THE PAST 12 MONTHS, HOW MANY TIMES HAVE YOU MOVED WHERE YOU WERE LIVING?: 0

## 2025-07-18 SDOH — SOCIAL STABILITY: SOCIAL INSECURITY: WITHIN THE LAST YEAR, HAVE YOU BEEN HUMILIATED OR EMOTIONALLY ABUSED IN OTHER WAYS BY YOUR PARTNER OR EX-PARTNER?: NO

## 2025-07-18 SDOH — SOCIAL STABILITY: SOCIAL INSECURITY: WITHIN THE LAST YEAR, HAVE YOU BEEN AFRAID OF YOUR PARTNER OR EX-PARTNER?: NO

## 2025-07-18 SDOH — ECONOMIC STABILITY: FOOD INSECURITY: WITHIN THE PAST 12 MONTHS, THE FOOD YOU BOUGHT JUST DIDN'T LAST AND YOU DIDN'T HAVE MONEY TO GET MORE.: NEVER TRUE

## 2025-07-18 SDOH — SOCIAL STABILITY: SOCIAL INSECURITY
WITHIN THE LAST YEAR, HAVE YOU BEEN KICKED, HIT, SLAPPED, OR OTHERWISE PHYSICALLY HURT BY YOUR PARTNER OR EX-PARTNER?: NO

## 2025-07-18 SDOH — ECONOMIC STABILITY: INCOME INSECURITY: IN THE PAST 12 MONTHS HAS THE ELECTRIC, GAS, OIL, OR WATER COMPANY THREATENED TO SHUT OFF SERVICES IN YOUR HOME?: NO

## 2025-07-18 SDOH — SOCIAL STABILITY: SOCIAL INSECURITY: DO YOU FEEL ANYONE HAS EXPLOITED OR TAKEN ADVANTAGE OF YOU FINANCIALLY OR OF YOUR PERSONAL PROPERTY?: NO

## 2025-07-18 SDOH — SOCIAL STABILITY: SOCIAL INSECURITY: HAVE YOU HAD THOUGHTS OF HARMING ANYONE ELSE?: NO

## 2025-07-18 SDOH — SOCIAL STABILITY: SOCIAL INSECURITY: DO YOU FEEL UNSAFE GOING BACK TO THE PLACE WHERE YOU ARE LIVING?: NO

## 2025-07-18 SDOH — SOCIAL STABILITY: SOCIAL INSECURITY: ARE THERE ANY APPARENT SIGNS OF INJURIES/BEHAVIORS THAT COULD BE RELATED TO ABUSE/NEGLECT?: NO

## 2025-07-18 SDOH — SOCIAL STABILITY: SOCIAL INSECURITY: DOES ANYONE TRY TO KEEP YOU FROM HAVING/CONTACTING OTHER FRIENDS OR DOING THINGS OUTSIDE YOUR HOME?: NO

## 2025-07-18 SDOH — SOCIAL STABILITY: SOCIAL INSECURITY: HAVE YOU HAD ANY THOUGHTS OF HARMING ANYONE ELSE?: NO

## 2025-07-18 SDOH — ECONOMIC STABILITY: FOOD INSECURITY: HOW HARD IS IT FOR YOU TO PAY FOR THE VERY BASICS LIKE FOOD, HOUSING, MEDICAL CARE, AND HEATING?: NOT HARD AT ALL

## 2025-07-18 SDOH — SOCIAL STABILITY: SOCIAL INSECURITY: ARE YOU OR HAVE YOU BEEN THREATENED OR ABUSED PHYSICALLY, EMOTIONALLY, OR SEXUALLY BY ANYONE?: NO

## 2025-07-18 SDOH — SOCIAL STABILITY: SOCIAL INSECURITY: HAS ANYONE EVER THREATENED TO HURT YOUR FAMILY OR YOUR PETS?: NO

## 2025-07-18 SDOH — ECONOMIC STABILITY: TRANSPORTATION INSECURITY: IN THE PAST 12 MONTHS, HAS LACK OF TRANSPORTATION KEPT YOU FROM MEDICAL APPOINTMENTS OR FROM GETTING MEDICATIONS?: NO

## 2025-07-18 SDOH — SOCIAL STABILITY: SOCIAL INSECURITY: ABUSE: ADULT

## 2025-07-18 SDOH — SOCIAL STABILITY: SOCIAL INSECURITY: WERE YOU ABLE TO COMPLETE ALL THE BEHAVIORAL HEALTH SCREENINGS?: YES

## 2025-07-18 ASSESSMENT — COGNITIVE AND FUNCTIONAL STATUS - GENERAL
PATIENT BASELINE BEDBOUND: NO
DAILY ACTIVITIY SCORE: 24
MOBILITY SCORE: 24

## 2025-07-18 ASSESSMENT — ACTIVITIES OF DAILY LIVING (ADL)
FEEDING YOURSELF: INDEPENDENT
ADEQUATE_TO_COMPLETE_ADL: YES
BATHING: INDEPENDENT
DRESSING YOURSELF: INDEPENDENT
ASSISTIVE_DEVICE: HEARING AID - RIGHT;HEARING AID - LEFT
PATIENT'S MEMORY ADEQUATE TO SAFELY COMPLETE DAILY ACTIVITIES?: YES
WALKS IN HOME: INDEPENDENT
LACK_OF_TRANSPORTATION: NO
HEARING - LEFT EAR: HEARING AID
HEARING - RIGHT EAR: HEARING AID
LACK_OF_TRANSPORTATION: NO
GROOMING: INDEPENDENT
TOILETING: INDEPENDENT
JUDGMENT_ADEQUATE_SAFELY_COMPLETE_DAILY_ACTIVITIES: YES

## 2025-07-18 ASSESSMENT — LIFESTYLE VARIABLES
HOW OFTEN DO YOU HAVE A DRINK CONTAINING ALCOHOL: 2-4 TIMES A MONTH
HOW MANY STANDARD DRINKS CONTAINING ALCOHOL DO YOU HAVE ON A TYPICAL DAY: 1 OR 2
SKIP TO QUESTIONS 9-10: 1
HOW OFTEN DO YOU HAVE 6 OR MORE DRINKS ON ONE OCCASION: NEVER
AUDIT-C TOTAL SCORE: 2
AUDIT-C TOTAL SCORE: 2

## 2025-07-18 ASSESSMENT — PAIN - FUNCTIONAL ASSESSMENT
PAIN_FUNCTIONAL_ASSESSMENT: 0-10
PAIN_FUNCTIONAL_ASSESSMENT: 0-10

## 2025-07-18 ASSESSMENT — PATIENT HEALTH QUESTIONNAIRE - PHQ9
1. LITTLE INTEREST OR PLEASURE IN DOING THINGS: NOT AT ALL
SUM OF ALL RESPONSES TO PHQ9 QUESTIONS 1 & 2: 0
2. FEELING DOWN, DEPRESSED OR HOPELESS: NOT AT ALL

## 2025-07-18 ASSESSMENT — PAIN SCALES - GENERAL
PAINLEVEL_OUTOF10: 0 - NO PAIN
PAINLEVEL_OUTOF10: 0 - NO PAIN

## 2025-07-18 NOTE — OP NOTE
Date of Procedure: July 18, 2025    Pre-operative Diagnosis:   1. Symptomatic severe aortic stenosis    Post-Operative Diagnosis:   1. Symptomatic severe aortic stenosis    Procedure:   1. Transcatheter Aortic Valve Replacement (34 mm Medtronic Evolut FX SN: [id number]) via right common femoral artery.  2. Percutaneous placement of balloon tipped pacing catheter via right internal jugular vein.  3. Left heart catheterization including left ventricular end diastolic pressure  4. Percutaneous balloon aortic valvuloplasty (24 mm True Balloon)  5. Percutaneous pre-close of the right and left common femoral artery    Surgeon:   Sabina Pruett MD    Cardiologist:   Everett Rush MD    Cardiology Fellow:  Courtney Christensen MD    Indications:   [place] Gagnon is an 80-year-old male who presents with symptomatic severe aortic stenosis. He has NYHA II symptoms.  An echocardiogram showed an LVEF of 55%, FRANCESCA 0.9 cm2, and mean AV gradient of 41 mmHg.  He was referred for TAVR, was discussed at the structural heart selection committee, and found to be a candidate for transcatheter treatment.     Intra-operative findings:   Severe calcification of the aortic valve leaflets.  Trileaflet valve.    Post-deployment transthoracic echocardiography showed that there was no paravalvular leak, and the mean gradient across the valve was 3 mmHg.  There was no heart block throughout the case.      Procedure Details:  After informed consent was obtained, and all questions asked and answered to the patient's satisfaction, he was brought back to the cardiology catheterization laboratory and placed on the Cath Lab table.  A timeout was performed with the correct patient, correct procedure, the correct laterality, timing and dosage of antibiotics, the availability of blood products, and all correct equipment was verified as being present prior to beginning the case.    Ultrasound guidance was utilized to place a 7 Portuguese locking sheath in the right  internal jugular vein.  This was done using a modified Seldinger technique.  A balloon tipped pacing wire was then floated through to the right ventricle and locked into place. Next, a 6 Fr sheath was placed in the left common femoral artery using a modified Seldinger technique.  A 6 Fr pigtail was placed over a wire into the non-coronary sinus. Next, attention was placed to the right common femoral artery, and using fluoroscopic guidance and a micropuncture needle, it was accessed using modified Seldinger technique.  2 Pro-Glide Perclose sutures were placed.  Next, an 18 Arabic sheath was placed in the primary access artery under direct fluoroscopic guidance using a Supracore wire.  The patient was then systemically heparinized. A J-wire was then used to place a JR-4 catheter in the ascending aorta, a floppy tip straight wire was utilized to cross the aortic valve, and the JR-4 catheter was moved into the left ventricle.  An exchange length J-wire was then utilized through the JR-4 catheter, this was exchanged for a pigtail catheter, and hemodynamics were measured. A Safari wire was placed through the pigtail catheter into the left ventricle.  The Evolut valve was then checked to ensure that it was loaded correctly on the sheath using fluoroscopy.      The 24 mm True Balloon was tracked over the Safari wire into the LVOT.  Rapid pacing was initiated and after decrement in ejection and blood pressure, the balloon was inflated and deflated rapidly.  Rapid pacing was then stopped and the balloon was removed from the body.    Next, the sheath was removed from the primary access artery over the wire and the delivery system and valve replaced bareback into the primary access artery.  The valve was then advanced on the delivery system through to the ascending aorta and we verified our correct projection.  The Evolut valve was then advanced across the native aortic valve and slowly this was deployed with rapid pacing.   After the valve was deployed 80%, the depth was evaluated and deemed appropriate.  We noted that there was one dot to the left of midline and two dots to the right of midline in our cusp-overlap view.  We then continued with slow deployment of the paddles and release of the Evolut valve from the delivery system.  The nose cone was then retracted into the valve centrally and the delivery system was removed to the descending thoracic aorta.  The pigtail was then removed from behind the cage of the Evolut valve and the delivery system was recaptured. The pigtail was placed through the valve into the LV and hemodynamics were again measured.    A Supracore wire was exchanged for the delivery wire and the delivery system was then removed from the primary access artery over the Supracore wire.  The 2 previously placed Pro-Glide Perclose sutures were cinched.  The Perclose devices were locked.  Next, attention was placed to the secondary access artery and this was closed with a Perclose device.  The patient was then transferred to the floor in stable condition.     Sabina Pruett MD  Cardiac Surgeon  Division of Cardiac Surgery  [facility] [place]

## 2025-07-18 NOTE — Clinical Note
Sheath was exchanged in the right internal jugular vein with INTRODUCER, FAST-CATH, 8 FR X 12 CM, W/LUER-LOCK.

## 2025-07-18 NOTE — Clinical Note
Temporary pacemaker tested. Temporary pacemaker location through right internal jugular vein. Heart rate: 100. Current 1 (mA). Good Capture

## 2025-07-18 NOTE — Clinical Note
The left DP pulse is 2+. The right DP pulse is detected w/ doppler. The left PT pulse is 1+. The right PT pulse is detected w/ doppler.

## 2025-07-18 NOTE — POST-PROCEDURE NOTE
Physician Transition of Care Summary  Invasive Cardiovascular Lab    Procedure Date: 7/18/2025  Attending: Panel 1:     * Sai Whipple - Primary  Panel 2:     * Sabina Pruett - Primary  Resident/Fellow/Other Assistant: Surgeons and Role:  Panel 1:     * Courtney Christensen MD - Fellow    Indications:   Pre-op Diagnosis      * Nonrheumatic aortic (valve) stenosis [I35.0]    Post-procedure diagnosis:   Post-op Diagnosis     * Nonrheumatic aortic (valve) stenosis [I35.0]    Procedure(s):   TVP for TAVR    TAVR-OR    TAVR (Transcatheter AV Replacement)  42116 - CT REPLACE AORTIC VALVE PERQ FEMORAL ARTRY APPROACH    CT REPLACE AORTIC VALVE PERQ FEMORAL ARTRY APPROACH [94929]    Indication: Severe Aortic Stenosis    Valve used: 34 mm Evolut FX+    Pre dil: True dilation balloon 24 mm    Access  Primary: right CFA s/p 2 proglide  Secondary: left CFA 6 Mohawk s/p 1 proglide    TVP: right IJ vein, 7 Mohawk, removed in the cath lab.     Post gradient TTE: 3  No PVL  No effusion    Conclusion  Monitor tele  Monitor access sites for bleeding  TTE tomorrow  Bed rest  Structural team will continue to follow.   page structural team for any concerning clinical events.    Stents/Implants:   Implants          Heart Valve Repair          Delivery System, Evolut Fx, 34mm - [id number] - Used, Not Implanted        Inventory item: DELIVERY SYSTEM, EVOLUT FX, 34MM Model/Cat number: [id number]    : [employer] Lot number: [id number]    Device identifier: [id number]        GUDID Information       Request status Successful        Brand name: Evolut™ FX Version/Model: [id number]-65    Company name: [employer] MRI safety info as of 7/18/25: Labeling does not contain MRI Safety Information    Contains dry or latex rubber: No      GMDN P.T. name: Aortic transcatheter heart valve bioprosthesis, stent-like framework                As of 7/18/2025       Status: Used, Not Implanted                        Valve,  Aortic, 34mm, Evolut Fx Transcatheter - [id number] - [id number] - Implanted        Inventory item: VALVE, AORTIC, 34MM, EVOLUT FX  TRANSCATHETER Model/Cat number: [id number]    Serial number: [id number] : [employer]    Lot number: [id number]        As of 7/18/2025       Status: Implanted                              Anticoagulation/Antiplatelet Plan:   ASA    Estimated Blood Loss:   15 mL    Anesthesia: Moderate Sedation Anesthesia Staff: No anesthesia staff entered.    Any Specimen(s) Removed:   Order Name Source Comment Collection Info Order Time   CBC Blood, Venous Please have blood drawn 4-7 days after your procedure. You do NOT have to fast.    If non- facility, please fax results to [phone]  7/16/2025  2:01 AM     Release result to [employer]App   Immediate        BASIC METABOLIC PANEL Blood, Venous Please have blood drawn 4-7 days after your procedure. You do NOT have to fast.    If non- facility, please fax results to [phone]  7/16/2025  2:01 AM     Release result to [facility]   Immediate        VERAB/VERIFY ABORH Blood, Venous Pre-op diagnosis:  Nonrheumatic aortic (valve) stenosis [I35.0] Collected By: Sai Whipple MD 7/18/2025 12:55 PM     Release result to [employer]   Immediate        TYPE AND SCREEN Blood, Venous Pre-op diagnosis:  Nonrheumatic aortic (valve) stenosis [I35.0] Collected By: Sai Whipple MD 7/18/2025 12:55 PM     Release result to [employer]   Immediate        BASIC METABOLIC PANEL Blood, Venous  Collected By: Armida Peguero RN 7/18/2025  5:07 PM     Release result to [employer]   Immediate        CBC WITH AUTO DIFFERENTIAL Blood, Venous  Collected By: Armida Peguero RN 7/18/2025  5:07 PM     Release result to [facility]   Immediate        MAGNESIUM Blood, Venous  Collected By: Armida Peguero RN 7/18/2025  5:52 PM     [facility]   Immediate        CBC WITH AUTO DIFFERENTIAL Blood, Venous   7/18/2025  7:01 PM     Release result to [facility]    Immediate        BASIC METABOLIC PANEL Blood, Venous   7/18/2025  7:01 PM     Release result to MyChart   Immediate        MAGNESIUM Blood, Venous   7/18/2025  7:01 PM     Release result to MyChart   Immediate            Disposition:   Tele      Electronically signed by: Courtney Christensen MD, 7/18/2025 7:35 PM

## 2025-07-18 NOTE — Clinical Note
Sheath was exchanged in the right femoral artery with SHEATH, INTRODUCER, HYDROPHILIC, PRELUDE IDEAL, 7FR, 11CM.

## 2025-07-18 NOTE — Clinical Note
Sheath was exchanged in the right femoral artery with INTRODUCER SHEATH, SENTRANT ENSURE SEAL 18FR 28CM.

## 2025-07-18 NOTE — Clinical Note
Sheath was exchanged in the left femoral artery with SHEATH, INTRODUCER, PRELUDE, 6FR 11CML, W/MINI ACCESS KIT.

## 2025-07-19 ENCOUNTER — APPOINTMENT (OUTPATIENT)
Dept: CARDIOLOGY | Facility: HOSPITAL | Age: 80
DRG: 267 | End: 2025-07-19
Payer: MEDICARE

## 2025-07-19 VITALS
TEMPERATURE: 97.3 F | HEART RATE: 68 BPM | OXYGEN SATURATION: 99 % | RESPIRATION RATE: 16 BRPM | WEIGHT: 163.47 LBS | DIASTOLIC BLOOD PRESSURE: 80 MMHG | SYSTOLIC BLOOD PRESSURE: 147 MMHG | BODY MASS INDEX: 24.49 KG/M2

## 2025-07-19 LAB
ANION GAP SERPL CALC-SCNC: 10 MMOL/L (ref 10–20)
AORTIC VALVE MEAN GRADIENT: 10 MMHG
AORTIC VALVE PEAK VELOCITY: 2.11 M/S
AV PEAK GRADIENT: 18 MMHG
AVA (PEAK VEL): 2.31 CM2
AVA (VTI): 2.41 CM2
BASOPHILS # BLD AUTO: 0.03 X10*3/UL (ref 0–0.1)
BASOPHILS NFR BLD AUTO: 0.3 %
BUN SERPL-MCNC: 13 MG/DL (ref 6–23)
CALCIUM SERPL-MCNC: 8 MG/DL (ref 8.6–10.6)
CHLORIDE SERPL-SCNC: 102 MMOL/L (ref 98–107)
CO2 SERPL-SCNC: 29 MMOL/L (ref 21–32)
CREAT SERPL-MCNC: 0.79 MG/DL (ref 0.5–1.3)
EGFRCR SERPLBLD CKD-EPI 2021: 89 ML/MIN/1.73M*2
EJECTION FRACTION APICAL 4 CHAMBER: 60.6
EJECTION FRACTION: 58 %
EOSINOPHIL # BLD AUTO: 0.09 X10*3/UL (ref 0–0.4)
EOSINOPHIL NFR BLD AUTO: 0.9 %
ERYTHROCYTE [DISTWIDTH] IN BLOOD BY AUTOMATED COUNT: 12 % (ref 11.5–14.5)
GLUCOSE BLD MANUAL STRIP-MCNC: 108 MG/DL (ref 74–99)
GLUCOSE SERPL-MCNC: 88 MG/DL (ref 74–99)
HCT VFR BLD AUTO: 39.2 % (ref 41–52)
HGB BLD-MCNC: 12.4 G/DL (ref 13.5–17.5)
IMM GRANULOCYTES # BLD AUTO: 0.03 X10*3/UL (ref 0–0.5)
IMM GRANULOCYTES NFR BLD AUTO: 0.3 % (ref 0–0.9)
LEFT VENTRICLE INTERNAL DIMENSION DIASTOLE: 4.6 CM (ref 3.5–6)
LEFT VENTRICULAR OUTFLOW TRACT DIAMETER: 2.5 CM
LYMPHOCYTES # BLD AUTO: 1.75 X10*3/UL (ref 0.8–3)
LYMPHOCYTES NFR BLD AUTO: 17.2 %
MAGNESIUM SERPL-MCNC: 2.04 MG/DL (ref 1.6–2.4)
MCH RBC QN AUTO: 30.1 PG (ref 26–34)
MCHC RBC AUTO-ENTMCNC: 31.6 G/DL (ref 32–36)
MCV RBC AUTO: 95 FL (ref 80–100)
MITRAL VALVE E/A RATIO: 0.69
MONOCYTES # BLD AUTO: 0.81 X10*3/UL (ref 0.05–0.8)
MONOCYTES NFR BLD AUTO: 8 %
NEUTROPHILS # BLD AUTO: 7.44 X10*3/UL (ref 1.6–5.5)
NEUTROPHILS NFR BLD AUTO: 73.3 %
NRBC BLD-RTO: 0 /100 WBCS (ref 0–0)
PLATELET # BLD AUTO: 162 X10*3/UL (ref 150–450)
POTASSIUM SERPL-SCNC: 3.3 MMOL/L (ref 3.5–5.3)
RBC # BLD AUTO: 4.12 X10*6/UL (ref 4.5–5.9)
RIGHT VENTRICLE FREE WALL PEAK S': 12 CM/S
SODIUM SERPL-SCNC: 138 MMOL/L (ref 136–145)
TRICUSPID ANNULAR PLANE SYSTOLIC EXCURSION: 2.8 CM
WBC # BLD AUTO: 10.2 X10*3/UL (ref 4.4–11.3)

## 2025-07-19 PROCEDURE — 82947 ASSAY GLUCOSE BLOOD QUANT: CPT

## 2025-07-19 PROCEDURE — 93308 TTE F-UP OR LMTD: CPT | Performed by: STUDENT IN AN ORGANIZED HEALTH CARE EDUCATION/TRAINING PROGRAM

## 2025-07-19 PROCEDURE — 36415 COLL VENOUS BLD VENIPUNCTURE: CPT | Performed by: NURSE PRACTITIONER

## 2025-07-19 PROCEDURE — 93325 DOPPLER ECHO COLOR FLOW MAPG: CPT | Performed by: STUDENT IN AN ORGANIZED HEALTH CARE EDUCATION/TRAINING PROGRAM

## 2025-07-19 PROCEDURE — 83735 ASSAY OF MAGNESIUM: CPT | Performed by: NURSE PRACTITIONER

## 2025-07-19 PROCEDURE — 93308 TTE F-UP OR LMTD: CPT

## 2025-07-19 PROCEDURE — 2500000001 HC RX 250 WO HCPCS SELF ADMINISTERED DRUGS (ALT 637 FOR MEDICARE OP): Performed by: STUDENT IN AN ORGANIZED HEALTH CARE EDUCATION/TRAINING PROGRAM

## 2025-07-19 PROCEDURE — 2500000001 HC RX 250 WO HCPCS SELF ADMINISTERED DRUGS (ALT 637 FOR MEDICARE OP): Performed by: NURSE PRACTITIONER

## 2025-07-19 PROCEDURE — 93005 ELECTROCARDIOGRAM TRACING: CPT

## 2025-07-19 PROCEDURE — 82374 ASSAY BLOOD CARBON DIOXIDE: CPT | Performed by: NURSE PRACTITIONER

## 2025-07-19 PROCEDURE — 85025 COMPLETE CBC W/AUTO DIFF WBC: CPT | Performed by: NURSE PRACTITIONER

## 2025-07-19 PROCEDURE — 93321 DOPPLER ECHO F-UP/LMTD STD: CPT | Performed by: STUDENT IN AN ORGANIZED HEALTH CARE EDUCATION/TRAINING PROGRAM

## 2025-07-19 PROCEDURE — 2500000005 HC RX 250 GENERAL PHARMACY W/O HCPCS: Performed by: NURSE PRACTITIONER

## 2025-07-19 RX ORDER — POTASSIUM CHLORIDE 1.5 G/1.58G
20 POWDER, FOR SOLUTION ORAL
Status: COMPLETED | OUTPATIENT
Start: 2025-07-19 | End: 2025-07-19

## 2025-07-19 RX ADMIN — POTASSIUM CHLORIDE 20 MEQ: 1.5 POWDER, FOR SOLUTION ORAL at 11:01

## 2025-07-19 RX ADMIN — PANTOPRAZOLE SODIUM 40 MG: 40 TABLET, DELAYED RELEASE ORAL at 06:48

## 2025-07-19 RX ADMIN — ASPIRIN 81 MG: 81 TABLET, CHEWABLE ORAL at 08:12

## 2025-07-19 RX ADMIN — Medication 2 L/MIN: at 08:12

## 2025-07-19 RX ADMIN — POTASSIUM CHLORIDE 20 MEQ: 1.5 POWDER, FOR SOLUTION ORAL at 11:00

## 2025-07-19 RX ADMIN — POTASSIUM CHLORIDE 20 MEQ: 1.5 POWDER, FOR SOLUTION ORAL at 11:04

## 2025-07-19 ASSESSMENT — COGNITIVE AND FUNCTIONAL STATUS - GENERAL
MOBILITY SCORE: 24
DAILY ACTIVITIY SCORE: 24

## 2025-07-19 ASSESSMENT — PAIN - FUNCTIONAL ASSESSMENT: PAIN_FUNCTIONAL_ASSESSMENT: 0-10

## 2025-07-19 ASSESSMENT — PAIN SCALES - GENERAL: PAINLEVEL_OUTOF10: 0 - NO PAIN

## 2025-07-19 NOTE — DISCHARGE SUMMARY
Discharge Diagnosis  Nonrheumatic aortic (valve) stenosis    Issues Requiring Follow-Up  Post-TAVR clinic    Test Results Pending At Discharge  Pending Labs       No current pending labs.            Hospital Course  80 y.o. male with PMH of HTN, HLD, s/p knee replacement surgery 6 weeks ago presents for evaluation of severe, mildly symptomatic aortic stenosis. Patient relates worsening fatigue, needing to take breaks when walking long distance or exercising on bike but denies SOB, PETERSON. Denies overt orthopnea, PND, exertional CP. Admitted for elective TAVR.     Procedure:  Valve used: 34 mm Evolut FX+  Pre dil: True dilation balloon 24 mm  Access  Primary: right CFA s/p 2 proglide  Secondary: left CFA 6 Mongolian s/p 1 proglide  TVP: right IJ vein, 7 Mongolian, removed in the cath lab.   Post gradient TTE: 3  No PVL  No effusion    Post Procedure:  ECG: new LAFB  Tele: HR > 50 bpm, no signs of high grade AV block  Groins: unremarkable  TTE: no pericardial effusion and MG of 3  Discharge on ASA    Visit Vitals  /80   Pulse 68   Temp 36.3 °C (97.3 °F)   Resp 16     Vitals:    07/19/25 0628   Weight: 74.2 kg (163 lb 7.5 oz)       Immunization History   Administered Date(s) Administered    Flu vaccine, quadrivalent, high-dose, preservative free, age 65y+ (FLUZONE) 09/09/2022    Flu vaccine, trivalent, preservative free, HIGH-DOSE, age 65y+ (Fluzone) 10/04/2015, 09/26/2020, 09/09/2022    Influenza, Seasonal, Quadrivalent, Adjuvanted 09/04/2021, 09/26/2023    Influenza, Unspecified 12/08/2010, 10/18/2013    Influenza, seasonal, injectable 08/30/2024    Influenza, trivalent, adjuvanted 02/03/2018, 09/06/2018, 09/13/2019    MMR vaccine, subcutaneous (MMR II) 03/21/2024    Moderna COVID-19 vaccine, 12 years and older (50mcg/0.5mL)(Spikevax) 09/26/2023    Moderna SARS-CoV-2 Vaccination 01/28/2021, 02/25/2021, 10/25/2021, 03/31/2022    Pfizer COVID-19 vaccine, bivalent, age 12 years and older (30 mcg/0.3 mL) 09/09/2022     Pfizer Purple Cap SARS-CoV-2 09/09/2022    Pneumococcal conjugate vaccine, 13-valent (PREVNAR 13) 02/03/2018    Pneumococcal polysaccharide vaccine, 23-valent, age 2 years and older (PNEUMOVAX 23) 12/17/2018    RSV, 60 Years And Older (AREXVY) 01/03/2024    SARS-CoV-2, Unspecified 08/30/2024    Tdap vaccine, age 7 year and older (BOOSTRIX, ADACEL) 03/21/2024    Zoster vaccine, recombinant, adult (SHINGRIX) 01/03/2024, 03/21/2024       Results        Pertinent Physical Exam At Time of Discharge  General: in no apparent distress.  HEENT: Normocephalic; atraumatic. Well hydrated.  Eyes: Anicteric sclera. Extraocular movement intact.  Neck: Supple; no thyromegaly; normal jugular venous pressure, no bruits.  Respiratory: Bilateral air entry equal. No wheezing.  Cardiovascular: Normal S1, S2; no murmurs auscultated.  Abdomen: Nondistended; nontender. (+) bowel sounds.  Extremities: No peripheral edema present. Pulses 2+ diffusely. Groins without erythema, bruising, induration, or hematoma.   Neurological: Oriented to time, place, and person; nonfocal.    Home Medications     Medication List      CONTINUE taking these medications     aspirin 81 mg EC tablet   hydroCHLOROthiazide 25 mg tablet; Commonly known as: HYDRODiuril; TAKE 1   TABLET BY MOUTH EVERY DAY   latanoprost 0.005 % ophthalmic solution; Commonly known as: Xalatan   multivitamin tablet   rosuvastatin 20 mg tablet; Commonly known as: Crestor; Take 1 tablet (20   mg) by mouth once daily.       Outpatient Follow-Up  Future Appointments   Date Time Provider Department Center   7/21/2025  9:00 AM  ESTRELLA [id number] CARD1 [id number] [facility]   8/11/2025  7:50 AM MIN ECHO [id number] [facility]   8/15/2025  2:00 PM [facility] MRI 1 [id number]   8/21/2025  1:00 PM SH ESTRELLA [id number] CARD1 [id number] [facility]   4/8/2026  8:20 AM Willy Irizarry MD [id number] [facility]   7/17/2026  8:00 AM SH ESTRELLA [id number] CARD1 [id number] [facility]   7/20/2026  8:15 AM [facility] DEE  1 [facility]       Courtney Christensen MD

## 2025-07-19 NOTE — NURSING NOTE
7/21/25 Nursing Discharge Note  AVS reviewed with patient and wife. All questions answered. IV and tele removed. Patient walked off the floor with wife and all belongings     Lorri Campos RN

## 2025-07-19 NOTE — CARE PLAN
The patient's goals for the shift include      The clinical goals for the shift include Pt will remain HDS throughout shift      Problem: Pain - Adult  Goal: Verbalizes/displays adequate comfort level or baseline comfort level  Outcome: Progressing     Problem: Safety - Adult  Goal: Free from fall injury  Outcome: Progressing     Problem: Discharge Planning  Goal: Discharge to home or other facility with appropriate resources  Outcome: Progressing     Problem: Chronic Conditions and Co-morbidities  Goal: Patient's chronic conditions and co-morbidity symptoms are monitored and maintained or improved  Outcome: Progressing     Problem: Nutrition  Goal: Nutrient intake appropriate for maintaining nutritional needs  Outcome: Progressing

## 2025-07-19 NOTE — CARE PLAN
The patient's goals for the shift include  rest    The clinical goals for the shift include pt will remain safe, injury-free and hds throughout the shift      Problem: Pain - Adult  Goal: Verbalizes/displays adequate comfort level or baseline comfort level  Outcome: Progressing     Problem: Safety - Adult  Goal: Free from fall injury  Outcome: Progressing     Problem: Discharge Planning  Goal: Discharge to home or other facility with appropriate resources  Outcome: Progressing     Problem: Chronic Conditions and Co-morbidities  Goal: Patient's chronic conditions and co-morbidity symptoms are monitored and maintained or improved  Outcome: Progressing     Problem: Nutrition  Goal: Nutrient intake appropriate for maintaining nutritional needs  Outcome: Progressing

## 2025-07-21 ENCOUNTER — TELEMEDICINE CLINICAL SUPPORT (OUTPATIENT)
Dept: CARDIOLOGY | Facility: HOSPITAL | Age: 80
End: 2025-07-21
Payer: MEDICARE

## 2025-07-21 DIAGNOSIS — I10 ESSENTIAL HYPERTENSION, BENIGN: Primary | ICD-10-CM

## 2025-07-21 LAB
ATRIAL RATE: 55 BPM
ATRIAL RATE: 59 BPM
P AXIS: 74 DEGREES
P AXIS: 78 DEGREES
P OFFSET: 185 MS
P OFFSET: 186 MS
P ONSET: 121 MS
P ONSET: 122 MS
PR INTERVAL: 198 MS
PR INTERVAL: 210 MS
Q ONSET: 220 MS
Q ONSET: 227 MS
QRS COUNT: 10 BEATS
QRS COUNT: 9 BEATS
QRS DURATION: 120 MS
QRS DURATION: 94 MS
QT INTERVAL: 472 MS
QT INTERVAL: 480 MS
QTC CALCULATION(BAZETT): 459 MS
QTC CALCULATION(BAZETT): 467 MS
QTC FREDERICIA: 466 MS
QTC FREDERICIA: 469 MS
R AXIS: -59 DEGREES
R AXIS: 16 DEGREES
T AXIS: 76 DEGREES
T AXIS: 96 DEGREES
T OFFSET: 460 MS
T OFFSET: 463 MS
VENTRICULAR RATE: 55 BPM
VENTRICULAR RATE: 59 BPM

## 2025-07-21 RX ORDER — METOPROLOL TARTRATE 25 MG/1
12.5 TABLET, FILM COATED ORAL 2 TIMES DAILY
Qty: 30 TABLET | Refills: 1 | Status: SHIPPED | OUTPATIENT
Start: 2025-07-21

## 2025-07-21 NOTE — PROGRESS NOTES
Called and spoke to Mr. Gagnon regarding a couple of questions he had left for our team.    Pt reported that his HR's have been 90-100s since coming home from his TAVR, he also reports feeling a skipped beat about every 10 beats.  Denies SOB/PETERSON, CP, or chest pressure.  -137/70-80s.      POD 1 ECHO showed a properly functioning TAVR valve, stable EF, minimal regurg from MV and TV, and no PC effusion.  Most recent labs showed a stable H/H and low potassium (3.3).      Plan:  - Take OTC potassium supplement today and tomorrow, then just eat potassium rich foods  - Obtain CBC and BMP on Wednesday 7/23/25  - Starting Metoprolol Tartrate 12.5mg BID for improved HR and BP control (script sent to pharmacy)  - Pt instructed to call/message me on 7/24/25 to discuss vitals      David Dickey MSN, AGACNP-BC  Acute Care Nurse Practitioner  Structural Heart / TAVR Team  [phone]  [phone] (fax)

## 2025-07-22 DIAGNOSIS — I35.0 NONRHEUMATIC AORTIC (VALVE) STENOSIS: ICD-10-CM

## 2025-07-22 DIAGNOSIS — Z95.2 S/P TAVR (TRANSCATHETER AORTIC VALVE REPLACEMENT): ICD-10-CM

## 2025-07-24 LAB
ANION GAP SERPL CALCULATED.4IONS-SCNC: 10 MMOL/L (CALC) (ref 7–17)
BUN SERPL-MCNC: 20 MG/DL (ref 7–25)
BUN/CREAT SERPL: ABNORMAL (CALC) (ref 6–22)
CALCIUM SERPL-MCNC: 9.5 MG/DL (ref 8.6–10.3)
CHLORIDE SERPL-SCNC: 101 MMOL/L (ref 98–110)
CO2 SERPL-SCNC: 27 MMOL/L (ref 20–32)
CREAT SERPL-MCNC: 1.05 MG/DL (ref 0.7–1.22)
EGFRCR SERPLBLD CKD-EPI 2021: 71 ML/MIN/1.73M2
ERYTHROCYTE [DISTWIDTH] IN BLOOD BY AUTOMATED COUNT: 12.2 % (ref 11–15)
GLUCOSE SERPL-MCNC: 123 MG/DL (ref 65–99)
HCT VFR BLD AUTO: 41.1 % (ref 38.5–50)
HGB BLD-MCNC: 13.4 G/DL (ref 13.2–17.1)
INR PPP: 1
MCH RBC QN AUTO: 30.5 PG (ref 27–33)
MCHC RBC AUTO-ENTMCNC: 32.6 G/DL (ref 32–36)
MCV RBC AUTO: 93.4 FL (ref 80–100)
PLATELET # BLD AUTO: 201 THOUSAND/UL (ref 140–400)
PMV BLD REES-ECKER: 10.5 FL (ref 7.5–12.5)
POTASSIUM SERPL-SCNC: 4.1 MMOL/L (ref 3.5–5.3)
PROTHROMBIN TIME: 10.3 SEC (ref 9–11.5)
RBC # BLD AUTO: 4.4 MILLION/UL (ref 4.2–5.8)
SODIUM SERPL-SCNC: 138 MMOL/L (ref 135–146)
WBC # BLD AUTO: 8.4 THOUSAND/UL (ref 3.8–10.8)

## 2025-07-25 ENCOUNTER — APPOINTMENT (OUTPATIENT)
Dept: PRIMARY CARE | Facility: CLINIC | Age: 80
End: 2025-07-25
Payer: MEDICARE

## 2025-08-01 ENCOUNTER — APPOINTMENT (OUTPATIENT)
Dept: CARDIOLOGY | Facility: HOSPITAL | Age: 80
End: 2025-08-01
Payer: MEDICARE

## 2025-08-06 LAB
ATRIAL RATE: 55 BPM
P AXIS: 78 DEGREES
P OFFSET: 185 MS
P ONSET: 121 MS
PR INTERVAL: 198 MS
Q ONSET: 220 MS
QRS COUNT: 9 BEATS
QRS DURATION: 94 MS
QT INTERVAL: 480 MS
QTC CALCULATION(BAZETT): 459 MS
QTC FREDERICIA: 466 MS
R AXIS: 16 DEGREES
T AXIS: 76 DEGREES
T OFFSET: 460 MS
VENTRICULAR RATE: 55 BPM

## 2025-08-11 ENCOUNTER — HOSPITAL ENCOUNTER (OUTPATIENT)
Dept: CARDIOLOGY | Facility: CLINIC | Age: 80
Discharge: HOME | End: 2025-08-11
Payer: MEDICARE

## 2025-08-11 DIAGNOSIS — Z95.2 S/P TAVR (TRANSCATHETER AORTIC VALVE REPLACEMENT): ICD-10-CM

## 2025-08-11 DIAGNOSIS — I35.0 NONRHEUMATIC AORTIC (VALVE) STENOSIS: ICD-10-CM

## 2025-08-11 LAB
AORTIC VALVE MEAN GRADIENT: 9 MMHG
AORTIC VALVE PEAK VELOCITY: 2.06 M/S
AV PEAK GRADIENT: 17 MMHG
AVA (PEAK VEL): 2.05 CM2
AVA (VTI): 1.92 CM2
EJECTION FRACTION APICAL 4 CHAMBER: 51.6
EJECTION FRACTION: 63 %
LEFT ATRIUM VOLUME AREA LENGTH INDEX BSA: 28.5 ML/M2
LEFT VENTRICLE INTERNAL DIMENSION DIASTOLE: 5.04 CM (ref 3.5–6)
LEFT VENTRICULAR OUTFLOW TRACT DIAMETER: 2.46 CM
MITRAL VALVE E/A RATIO: 0.81
RIGHT VENTRICLE FREE WALL PEAK S': 14 CM/S
RIGHT VENTRICLE PEAK SYSTOLIC PRESSURE: 31 MMHG
TRICUSPID ANNULAR PLANE SYSTOLIC EXCURSION: 1.9 CM

## 2025-08-11 PROCEDURE — 93306 TTE W/DOPPLER COMPLETE: CPT

## 2025-08-15 ENCOUNTER — HOSPITAL ENCOUNTER (OUTPATIENT)
Dept: RADIOLOGY | Facility: HOSPITAL | Age: 80
Discharge: HOME | End: 2025-08-15
Payer: MEDICARE

## 2025-08-15 DIAGNOSIS — Z00.6 RESEARCH EXAM: ICD-10-CM

## 2025-08-15 PROCEDURE — 75559 CARDIAC MRI W/STRESS IMG: CPT

## 2025-08-21 ENCOUNTER — TELEMEDICINE (OUTPATIENT)
Dept: CARDIOLOGY | Facility: CLINIC | Age: 80
End: 2025-08-21
Payer: MEDICARE

## 2025-08-21 DIAGNOSIS — Z95.2 S/P TAVR (TRANSCATHETER AORTIC VALVE REPLACEMENT): Primary | ICD-10-CM

## 2025-08-23 LAB
ANION GAP SERPL CALCULATED.4IONS-SCNC: 10 MMOL/L (CALC) (ref 7–17)
BUN SERPL-MCNC: 18 MG/DL (ref 7–25)
BUN/CREAT SERPL: NORMAL (CALC) (ref 6–22)
CALCIUM SERPL-MCNC: 9.8 MG/DL (ref 8.6–10.3)
CHLORIDE SERPL-SCNC: 101 MMOL/L (ref 98–110)
CO2 SERPL-SCNC: 28 MMOL/L (ref 20–32)
CREAT SERPL-MCNC: 1.07 MG/DL (ref 0.7–1.22)
EGFRCR SERPLBLD CKD-EPI 2021: 70 ML/MIN/1.73M2
GLUCOSE SERPL-MCNC: 90 MG/DL (ref 65–99)
MAGNESIUM SERPL-MCNC: 2.3 MG/DL (ref 1.5–2.5)
POTASSIUM SERPL-SCNC: 3.7 MMOL/L (ref 3.5–5.3)
SODIUM SERPL-SCNC: 139 MMOL/L (ref 135–146)

## 2025-08-28 DIAGNOSIS — Z95.2 S/P TAVR (TRANSCATHETER AORTIC VALVE REPLACEMENT): ICD-10-CM

## 2025-09-15 ENCOUNTER — APPOINTMENT (OUTPATIENT)
Dept: CARDIOLOGY | Facility: CLINIC | Age: 80
End: 2025-09-15
Payer: MEDICARE

## 2025-10-06 ENCOUNTER — APPOINTMENT (OUTPATIENT)
Dept: INFECTIOUS DISEASES | Facility: CLINIC | Age: 80
End: 2025-10-06
Payer: MEDICARE

## 2026-04-08 ENCOUNTER — APPOINTMENT (OUTPATIENT)
Dept: PRIMARY CARE | Facility: CLINIC | Age: 81
End: 2026-04-08
Payer: MEDICARE

## (undated) DEVICE — PAD, ELECTRODE DEFIB PADPRO ADULT STRL W/ADAPTER

## (undated) DEVICE — PREP, SKIN, CHLORHEXIDINE GLUCONATE, 2%, 1 QT

## (undated) DEVICE — CATHETER, DIAGNOSTIC, 4 FR-JR 4

## (undated) DEVICE — GUIDEWIRE, SAFARI 2, .035 X 275CM, EXTRA SMALL CURVE, PRE-SHAPED

## (undated) DEVICE — 4MM X 110MM MAKO BONE PINS

## (undated) DEVICE — CHECKPOINT KIT, FEMORAL/ TIBIAL

## (undated) DEVICE — DRAPE, INCISE, ANTIMICROBIAL, IOBAN 2, LARGE, 17 X 23 IN, DISPOSABLE, STERILE

## (undated) DEVICE — PREP, SKIN, BETADINE, SOLUTION, 16 OZ

## (undated) DEVICE — CABLE, PACING PATIENT BIPOLAR 8'

## (undated) DEVICE — DRAPE, SHEET, FAN FOLDED, HALF, 44 X 58 IN, DISPOSABLE, LF, STERILE

## (undated) DEVICE — APPLICATOR, CHLORAPREP, W/ORANGE TINT, 26ML

## (undated) DEVICE — CATHETER, ANGIO, IMPULSE, FR4, 6 FR X 100 CM

## (undated) DEVICE — Device

## (undated) DEVICE — GLOVE, SURGICAL, PROTEXIS NEOPRENE, 7.0, PF, LF

## (undated) DEVICE — SHEATH, GLIDESHEATH, SLENDER, 6FR 10CM

## (undated) DEVICE — GUIDEWIRE, EXCHANGE, J-TIP, FIXED CORE, 3 MM, 0.035 IN X 260 CM

## (undated) DEVICE — SOLUTION, INJECTION, SODIUM CHLORIDE 9%, 3000ML

## (undated) DEVICE — DEVICE, CLOSURE, PERCLOSE, PROSTYLE

## (undated) DEVICE — CATHETER, BALLOON DILATION, LOMA VISTA, 24MM X 4.5CM

## (undated) DEVICE — SHEATH, INTRODUCER, PRELUDE, 6FR 11CML, W/MINI ACCESS KIT

## (undated) DEVICE — TR BAND, RADIAL COMPRESSION, STANDARD, 24CM

## (undated) DEVICE — PIN, BONE, 4MM X 140MM, STERILE

## (undated) DEVICE — CATHETER, ANGIO, IMPULSE, PIG 155, 6 FR X 110 CM

## (undated) DEVICE — GUIDEWIRE, .035 X 150 PTFE, 3MM J, STRT

## (undated) DEVICE — SYRINGE ONLY, SLIP TIP 50CC

## (undated) DEVICE — SUTURE, VICRYL, 3-0, 27 IN, SH

## (undated) DEVICE — BLADE, MAKO, SAGITTAL, STANDARD

## (undated) DEVICE — LOADING SYSTEM, EVOLUT FX, 34MM

## (undated) DEVICE — SHEATH, INTRODUCER, HYDROPHILIC, PRELUDE IDEAL, 7FR, 11CM

## (undated) DEVICE — WOUND SYSTEM, DEBRIDEMENT & CLEANING, O.R DUOPAK

## (undated) DEVICE — ACCESS KIT, MINI MAK, 4FR X 10CM, 0.018 X 40CM, ECHO ENHANCED 4CM NDL, SS/SS

## (undated) DEVICE — CATHETER, ANGIO, IMPULSE, FL3.5, 5 FR X 100 CM

## (undated) DEVICE — INTRODUCER, FAST-CATH, 8 FR X 12 CM, W/LUER-LOCK

## (undated) DEVICE — GUIDEWIRE, INQWIRE, 3MM J, .035 X 210CM, FIXED

## (undated) DEVICE — DRAPE, SHEET, THREE QUARTER, FAN FOLD, 57 X 77 IN

## (undated) DEVICE — GLOVE, SURGICAL, PROTEXIS PI , 7.0, PF, LF

## (undated) DEVICE — SUTURE, STRATAFIX, SPIRAL MONOCRYL PLUS, 3-0, PS-1 45CM, UNDYED

## (undated) DEVICE — STRYKER RIO DRAPE KIT (FORMERLY MFR'D BY MAKO)

## (undated) DEVICE — CATHETER, PACING, PACEL, FLOW DIRECTED, 5 FR X 110 CM

## (undated) DEVICE — SUTURE, STRATAFIX PDS PLUS, 1, OS-6, SYMMETRIC 60CM

## (undated) DEVICE — IRRIGATION SYSTEM, WOUND, PULSAVAC PLUS

## (undated) DEVICE — GUIDE WIRE, 035/190CM, HI-TORGUE SUPRA CORE

## (undated) DEVICE — TRACKING KIT, TM KNEE PROCEDURES, VIZADISC

## (undated) DEVICE — INTRODUCER SET, MICROPUNCT, STIFF, 4FR 10CM, ECHO TIP, NITINOL WIRE

## (undated) DEVICE — INTRODUCER SHEATH, SENTRANT ENSURE SEAL 18FR 28CM

## (undated) DEVICE — SUTURE, VICRYL, 0, 18 IN, CT-1, UNDYED

## (undated) DEVICE — SUTURE, STRATAFIX, 3-0 MONOCRYL PLUS, PS-2 SPIRAL UNDYED

## (undated) DEVICE — MARKER, SURGICAL, SKIN, REG TIP, W/ RULER & LABELS

## (undated) DEVICE — SHEATH, PINNACLE, 10 CM,  6FR INTRODUCER, 6FR DIA, 2.5 CM DIALATOR

## (undated) DEVICE — SUTURE, ETHIBOND, P2, V-37, 30 IN, GREEN

## (undated) DEVICE — CATHETER, DIAGNOSTIC, 6 FR INFINITI, PIG